# Patient Record
Sex: MALE | Race: WHITE | NOT HISPANIC OR LATINO | Employment: OTHER | URBAN - NONMETROPOLITAN AREA
[De-identification: names, ages, dates, MRNs, and addresses within clinical notes are randomized per-mention and may not be internally consistent; named-entity substitution may affect disease eponyms.]

---

## 2018-08-13 ENCOUNTER — RESULTS ENCOUNTER (OUTPATIENT)
Dept: FAMILY MEDICINE CLINIC | Facility: CLINIC | Age: 48
End: 2018-08-13

## 2018-08-13 ENCOUNTER — OFFICE VISIT (OUTPATIENT)
Dept: FAMILY MEDICINE CLINIC | Facility: CLINIC | Age: 48
End: 2018-08-13

## 2018-08-13 VITALS
TEMPERATURE: 98.2 F | SYSTOLIC BLOOD PRESSURE: 120 MMHG | DIASTOLIC BLOOD PRESSURE: 76 MMHG | WEIGHT: 236.2 LBS | BODY MASS INDEX: 34.98 KG/M2 | RESPIRATION RATE: 16 BRPM | OXYGEN SATURATION: 98 % | HEART RATE: 59 BPM | HEIGHT: 69 IN

## 2018-08-13 DIAGNOSIS — R47.01 EXPRESSIVE APHASIA: ICD-10-CM

## 2018-08-13 DIAGNOSIS — I10 ESSENTIAL HYPERTENSION: Primary | ICD-10-CM

## 2018-08-13 DIAGNOSIS — I10 ESSENTIAL HYPERTENSION: ICD-10-CM

## 2018-08-13 DIAGNOSIS — I69.398 ALTERATION OF SENSATIONS, POST-STROKE: ICD-10-CM

## 2018-08-13 DIAGNOSIS — R20.9 ALTERATION OF SENSATIONS, POST-STROKE: ICD-10-CM

## 2018-08-13 PROBLEM — G47.33 OBSTRUCTIVE SLEEP APNEA: Status: ACTIVE | Noted: 2018-08-13

## 2018-08-13 PROCEDURE — 99202 OFFICE O/P NEW SF 15 MIN: CPT | Performed by: NURSE PRACTITIONER

## 2018-08-13 RX ORDER — LABETALOL 100 MG/1
100 TABLET, FILM COATED ORAL 3 TIMES DAILY
COMMUNITY
End: 2018-08-13 | Stop reason: SDUPTHER

## 2018-08-13 RX ORDER — SENNA AND DOCUSATE SODIUM 50; 8.6 MG/1; MG/1
1 TABLET, FILM COATED ORAL DAILY
COMMUNITY
End: 2018-09-13

## 2018-08-13 RX ORDER — LISINOPRIL 20 MG/1
20 TABLET ORAL DAILY
Qty: 90 TABLET | Refills: 3 | Status: SHIPPED | OUTPATIENT
Start: 2018-08-13 | End: 2019-01-07 | Stop reason: SDUPTHER

## 2018-08-13 RX ORDER — SPIRONOLACTONE 25 MG/1
25 TABLET ORAL DAILY
Qty: 90 TABLET | Refills: 3 | Status: SHIPPED | OUTPATIENT
Start: 2018-08-13 | End: 2019-01-08 | Stop reason: SDUPTHER

## 2018-08-13 RX ORDER — AMLODIPINE BESYLATE 10 MG/1
10 TABLET ORAL DAILY
COMMUNITY
End: 2018-08-13 | Stop reason: SDUPTHER

## 2018-08-13 RX ORDER — LISINOPRIL 20 MG/1
20 TABLET ORAL DAILY
COMMUNITY
End: 2018-08-13 | Stop reason: SDUPTHER

## 2018-08-13 RX ORDER — LABETALOL 100 MG/1
100 TABLET, FILM COATED ORAL 3 TIMES DAILY
Qty: 270 TABLET | Refills: 3 | Status: SHIPPED | OUTPATIENT
Start: 2018-08-13 | End: 2019-01-08 | Stop reason: SDUPTHER

## 2018-08-13 RX ORDER — SPIRONOLACTONE 25 MG/1
25 TABLET ORAL DAILY
COMMUNITY
End: 2018-08-13 | Stop reason: SDUPTHER

## 2018-08-13 RX ORDER — ACETAMINOPHEN 325 MG/1
650 TABLET ORAL EVERY 4 HOURS PRN
COMMUNITY

## 2018-08-13 RX ORDER — AMLODIPINE BESYLATE 10 MG/1
10 TABLET ORAL DAILY
Qty: 90 TABLET | Refills: 3 | Status: SHIPPED | OUTPATIENT
Start: 2018-08-13 | End: 2019-01-07 | Stop reason: SDUPTHER

## 2018-08-13 RX ORDER — DOCUSATE SODIUM 100 MG/1
100 CAPSULE, LIQUID FILLED ORAL 2 TIMES DAILY
COMMUNITY
End: 2019-01-07 | Stop reason: SDUPTHER

## 2018-08-13 NOTE — PROGRESS NOTES
Subjective   Gaurav Mora is a 48 y.o. male. Here to establish care    History of Present Illness 48-year-old  male presents to the office today to establish care. Patient is known to have had a stroke. No known allergies. Using Tylenol 325 mg tablet as needed for pain. Norvasc 10 mg tablet per day for blood pressure aspirin 81 mg tablet per day for heart health Colace 100 mg capsule as needed for constipation. Labia tall 100 mg tablet for blood pressure and lisinopril 20 mg tablet per day as needed for blood pressure also uses Senokot-as 8.6/50 mg tablet per day for constipation and Aldactone 25 mg tablet per day for edema. Offers no complaints. From hypertension and stroke patient also has sleep apnea. No surgical history noted family history of atrial fibrillation hypertension and cancer. Patient is never used any form of tobacco alcohol or drugs. Per health maintenance history patient is due for a T dap and an annual physical.    The following portions of the patient's history were reviewed and updated as appropriate: allergies, current medications, past family history, past medical history, past social history, past surgical history and problem list.    Review of Systems   Cardiovascular:        History of stroke hypertension   All other systems reviewed and are negative.      Objective   Physical Exam   Constitutional: He is oriented to person, place, and time. He appears well-developed and well-nourished.   HENT:   Head: Normocephalic and atraumatic.   Eyes: Pupils are equal, round, and reactive to light. EOM are normal.   No peripheral vision in either eye.   Neck: Normal range of motion. Neck supple.   Cardiovascular: Normal rate and regular rhythm.    Pulmonary/Chest: Effort normal and breath sounds normal.   Abdominal: Soft. Bowel sounds are normal.   Musculoskeletal:   Slightly guarded gait but able to walk without walker or cane.   Neurological: He is alert and oriented to person, place, and  time.   Numbness tingling loss of feeling on right side of body. Sometimes needs instructions repeated slowly and clearly for second time.   Skin: Skin is warm and dry. Capillary refill takes less than 2 seconds.   Psychiatric: He has a normal mood and affect. His behavior is normal. Judgment and thought content normal.   Nursing note and vitals reviewed.      Assessment/Plan   Gaurav was seen today for establish care and history of stroke.    Diagnoses and all orders for this visit:    Essential hypertension  -     CBC & Differential; Future  -     Comprehensive Metabolic Panel; Future  -     Lipid Panel; Future  -     TSH; Future  -     Ambulatory Referral to Cardiology    Alteration of sensations, post-stroke  -     Ambulatory Referral to Neurology    Expressive aphasia    Other orders  -     spironolactone (ALDACTONE) 25 MG tablet; Take 1 tablet by mouth Daily.  -     lisinopril (PRINIVIL,ZESTRIL) 20 MG tablet; Take 1 tablet by mouth Daily.  -     labetalol (NORMODYNE) 100 MG tablet; Take 1 tablet by mouth 3 (Three) Times a Day.  -     amLODIPine (NORVASC) 10 MG tablet; Take 1 tablet by mouth Daily.     Will need to request records from previous provider. Made patient aware of availability for annual physical. Patient aware of need for T dap. She will return for fasting labs. Labs will be called once resulted. Medications renewed as needed. Patient to continue eating a heart healthy diet low-fat high-fiber full of fruits and vegetables drinking six-day glasses water a day and ambulating every day for 30 minutes to raise heart rate this will help maintain good bone density muscle mass heart health and has been known to elevate the psyche. To notify this office immediately for any decrease in health status. Patient current on all health care maintenance issues at this time the exception of annual physical and tetanus vaccine.

## 2018-08-13 NOTE — PROGRESS NOTES
Prosper Mora is a 48 y.o. male.   History of Present Illness     {Common H&P Review Areas:26492}    Review of Systems   All other systems reviewed and are negative.      Objective   Physical Exam      Assessment/Plan   {Assess/PlanSmartLinks:03170}

## 2018-08-14 LAB
ALBUMIN SERPL-MCNC: 4.8 G/DL (ref 3.5–5.2)
ALBUMIN/GLOB SERPL: 2.1 G/DL
ALP SERPL-CCNC: 65 U/L (ref 39–117)
ALT SERPL-CCNC: 23 U/L (ref 1–41)
AST SERPL-CCNC: 16 U/L (ref 1–40)
BASOPHILS # BLD AUTO: 0.03 10*3/MM3 (ref 0–0.2)
BASOPHILS NFR BLD AUTO: 0.5 % (ref 0–1.5)
BILIRUB SERPL-MCNC: 0.6 MG/DL (ref 0.1–1.2)
BUN SERPL-MCNC: 15 MG/DL (ref 6–20)
BUN/CREAT SERPL: 12 (ref 7–25)
CALCIUM SERPL-MCNC: 9.2 MG/DL (ref 8.6–10.5)
CHLORIDE SERPL-SCNC: 97 MMOL/L (ref 98–107)
CHOLEST SERPL-MCNC: 194 MG/DL (ref 0–200)
CO2 SERPL-SCNC: 29.5 MMOL/L (ref 22–29)
CREAT SERPL-MCNC: 1.25 MG/DL (ref 0.76–1.27)
EOSINOPHIL # BLD AUTO: 0.14 10*3/MM3 (ref 0–0.7)
EOSINOPHIL NFR BLD AUTO: 2.2 % (ref 0.3–6.2)
ERYTHROCYTE [DISTWIDTH] IN BLOOD BY AUTOMATED COUNT: 12.6 % (ref 11.5–14.5)
GLOBULIN SER CALC-MCNC: 2.3 GM/DL
GLUCOSE SERPL-MCNC: 89 MG/DL (ref 65–99)
HCT VFR BLD AUTO: 44.8 % (ref 40.4–52.2)
HDLC SERPL-MCNC: 39 MG/DL (ref 40–60)
HGB BLD-MCNC: 14.5 G/DL (ref 13.7–17.6)
IMM GRANULOCYTES # BLD: 0.01 10*3/MM3 (ref 0–0.03)
IMM GRANULOCYTES NFR BLD: 0.2 % (ref 0–0.5)
LDLC SERPL CALC-MCNC: 142 MG/DL (ref 0–100)
LYMPHOCYTES # BLD AUTO: 2.01 10*3/MM3 (ref 0.9–4.8)
LYMPHOCYTES NFR BLD AUTO: 32.3 % (ref 19.6–45.3)
MCH RBC QN AUTO: 27.7 PG (ref 27–32.7)
MCHC RBC AUTO-ENTMCNC: 32.4 G/DL (ref 32.6–36.4)
MCV RBC AUTO: 85.7 FL (ref 79.8–96.2)
MONOCYTES # BLD AUTO: 0.51 10*3/MM3 (ref 0.2–1.2)
MONOCYTES NFR BLD AUTO: 8.2 % (ref 5–12)
NEUTROPHILS # BLD AUTO: 3.54 10*3/MM3 (ref 1.9–8.1)
NEUTROPHILS NFR BLD AUTO: 56.8 % (ref 42.7–76)
PLATELET # BLD AUTO: 216 10*3/MM3 (ref 140–500)
POTASSIUM SERPL-SCNC: 4.4 MMOL/L (ref 3.5–5.2)
PROT SERPL-MCNC: 7.1 G/DL (ref 6–8.5)
RBC # BLD AUTO: 5.23 10*6/MM3 (ref 4.6–6)
SODIUM SERPL-SCNC: 138 MMOL/L (ref 136–145)
TRIGL SERPL-MCNC: 66 MG/DL (ref 0–150)
TSH SERPL DL<=0.005 MIU/L-ACNC: 1.86 MIU/ML (ref 0.27–4.2)
VLDLC SERPL CALC-MCNC: 13.2 MG/DL (ref 5–40)
WBC # BLD AUTO: 6.23 10*3/MM3 (ref 4.5–10.7)

## 2018-08-14 NOTE — PROGRESS NOTES
I have reviewed the notes, assessments, and/or procedures performed by Tressa Hwang, I concur with her/his documentation of Gaurav Mora.

## 2018-09-13 ENCOUNTER — OFFICE VISIT (OUTPATIENT)
Dept: CARDIOLOGY | Facility: CLINIC | Age: 48
End: 2018-09-13

## 2018-09-13 VITALS
BODY MASS INDEX: 34.39 KG/M2 | DIASTOLIC BLOOD PRESSURE: 70 MMHG | HEIGHT: 69 IN | HEART RATE: 63 BPM | SYSTOLIC BLOOD PRESSURE: 128 MMHG | WEIGHT: 232.2 LBS

## 2018-09-13 DIAGNOSIS — I61.9 HEMORRHAGIC CEREBROVASCULAR ACCIDENT (CVA) (HCC): ICD-10-CM

## 2018-09-13 DIAGNOSIS — I10 ESSENTIAL HYPERTENSION: Primary | ICD-10-CM

## 2018-09-13 PROCEDURE — 93000 ELECTROCARDIOGRAM COMPLETE: CPT | Performed by: INTERNAL MEDICINE

## 2018-09-13 PROCEDURE — 99204 OFFICE O/P NEW MOD 45 MIN: CPT | Performed by: INTERNAL MEDICINE

## 2018-09-27 ENCOUNTER — CLINICAL SUPPORT (OUTPATIENT)
Dept: FAMILY MEDICINE CLINIC | Facility: CLINIC | Age: 48
End: 2018-09-27

## 2018-09-27 DIAGNOSIS — Z23 ENCOUNTER FOR IMMUNIZATION: Primary | ICD-10-CM

## 2018-09-27 PROCEDURE — 90471 IMMUNIZATION ADMIN: CPT | Performed by: FAMILY MEDICINE

## 2018-09-27 PROCEDURE — 90674 CCIIV4 VAC NO PRSV 0.5 ML IM: CPT | Performed by: FAMILY MEDICINE

## 2018-09-30 NOTE — PROGRESS NOTES
Date of Office Visit: 2018  Encounter Provider: Jeremiah Bradford MD  Place of Service: [facility] CARDIOLOGY  Patient Name: Gaurav Mora  :1970    Chief complaint: Hypertension, prior hemorrhagic CVA.    History of Present Illness:    I had the pleasure of seeing the patient in cardiology office on 2018.  He is a   very pleasant 48 year-old white male with a history of hypertension, obstructive   sleep apnea, and hemorrhagic CVA who presents to establish care. The patient's   father is with him today, and recently moved from [place] to assist in his care.    The [facility] [place] [facility] [place] [place]. He experienced   right sided neurological symptoms and visual disturbances on 2017. He was   brought to the hospital at that time where he was found to have an extensive   hemorrhagic CVA.  His blood pressure upon presentation was 242/130.  He was   unaware that he had hypertension prior to this, and it was felt that the hypertension   caused the brain hemorrhage.  Since that time, he has been placed on amlodipine,   labetalol, lisinopril, and spironolactone with good results.  He still does have   complete loss of sensation on the right side (although he still has good strength in   the right side), as well as loss of peripheral vision.  He did have an echocardiogram   in [place] the time of his stroke on 2017 which showed an ejection fraction of   60%, and a very mildly dilated proximal aorta at 3.6 cm.    The patient presents today to establish care.  He is mainly here for hypertension   management.  He is very faithful about taking all of his blood pressure medications,   and frequently checks his blood pressure.  His blood pressure has been doing well   recently.  He has not had any shortness of breath or chest pain.  He does have   obstructive sleep apnea, but is faithful about wearing his CPAP mask.    Past Medical  History:   Diagnosis Date   • Hemorrhagic stroke (CMS/HCC) 11/24/2017    Residual peripheral vision loss, right-sided sensation loss   • Hypertension    • SHANE on CPAP        Past Surgical History:   Procedure Laterality Date   • APPENDECTOMY         Current Outpatient Prescriptions on File Prior to Visit   Medication Sig Dispense Refill   • acetaminophen (TYLENOL) 325 MG tablet Take 650 mg by mouth Every 4 (Four) Hours As Needed for Mild Pain .     • amLODIPine (NORVASC) 10 MG tablet Take 1 tablet by mouth Daily. 90 tablet 3   • aspirin 81 MG tablet Take 81 mg by mouth Daily.     • docusate sodium (COLACE) 100 MG capsule Take 100 mg by mouth 2 (Two) Times a Day.     • labetalol (NORMODYNE) 100 MG tablet Take 1 tablet by mouth 3 (Three) Times a Day. 270 tablet 3   • lisinopril (PRINIVIL,ZESTRIL) 20 MG tablet Take 1 tablet by mouth Daily. 90 tablet 3   • spironolactone (ALDACTONE) 25 MG tablet Take 1 tablet by mouth Daily. 90 tablet 3     No current facility-administered medications on file prior to visit.      Allergies as of 09/13/2018   • (No Known Allergies)     Social History     Social History   • Marital status: Single     Spouse name: N/A   • Number of children: 0   • Years of education: N/A     Occupational History   • disability      Social History Main Topics   • Smoking status: Never Smoker   • Smokeless tobacco: Never Used      Comment: minimal caffeine   • Alcohol use No   • Drug use: No   • Sexual activity: Not on file     Other Topics Concern   • Not on file     Social History Narrative   • No narrative on file     Family History   Problem Relation Age of Onset   • Breast cancer Mother    • Atrial fibrillation Father    • Hypertension Father    • Colon cancer Paternal Grandmother    • Heart disease Maternal Grandfather    • Heart disease Maternal Aunt    • Heart disease Maternal Uncle        Review of Systems   Eyes: Positive for visual disturbance.   Neurological: Positive for numbness.   All other  "systems reviewed and are negative.     Objective:     Vitals:    09/13/18 1133   BP: 128/70   Pulse: 63   Weight: 105 kg (232 lb 3.2 oz)   Height: 175.3 cm (69.02\")     Body mass index is 34.27 kg/m².    Physical Exam   Constitutional: He is oriented to person, place, and time. He appears well-developed and well-nourished.   HENT:   Head: Normocephalic and atraumatic.   Eyes: Conjunctivae are normal.   Neck: Neck supple.   Cardiovascular: Normal rate and regular rhythm.  Exam reveals no gallop and no friction rub.    No murmur heard.  Pulmonary/Chest: Effort normal and breath sounds normal.   Abdominal: Soft. There is no tenderness.   Musculoskeletal: He exhibits no edema.   Neurological: He is alert and oriented to person, place, and time.   Skin: Skin is warm.   Psychiatric: He has a normal mood and affect. His behavior is normal.     Lab Review:     ECG 12 Lead  Date/Time: 9/13/2018 11:38 AM  Performed by: KALI MCDONALD  Authorized by: KALI MCDONALD   Comparison: not compared with previous ECG   Previous ECG: no previous ECG available  Rhythm: sinus rhythm  Rate: normal  BPM: 63  Clinical impression: abnormal ECG  Comments: Possible old septal infarct           Cardiac Procedures:  1.  Echocardiogram on 11/27/2017 in [place], [place]: The ejection fraction was 60%. The   aortic root measured 3.8 cm.  The proximal aorta was 3.6 cm.    Assessment:       Diagnosis Plan   1. Essential hypertension     2. Hemorrhagic cerebrovascular accident (CVA) (CMS/[facility]ly [facility])       Plan:       The patient seems to be doing well.  Again, he had severe undiagnosed hypertension at the   time of his hemorrhagic stroke in November 2017.  Since that time, he has been very faithful   about taking his medications, and his blood pressure has responded to his current regimen.    He currently is taking amlodipine 10 mg per day, labetalol 100 mg 3 times a day, lisinopril 20   mg per day, and spironolactone 25 mg per day.  His blood " pressure today is 128/70.  He is   also faithful about wearing his CPAP mask.  His echocardiogram in [place] he did show a   very mildly dilated ascending aorta at 3.6 cm.  I will likely check a CT angiogram of his chest   at some point to ensure that he does not have an aortic aneurysm distal to this site.  I will   discuss this with him at his next visit.  The patient is currently on disability, and still has   residual neurological sequelae from the hemorrhagic stroke. However, his father has moved   here, and helps with his care.  For now, I will plan on seeing him back in the office in 6   months.

## 2018-11-05 ENCOUNTER — OFFICE VISIT (OUTPATIENT)
Dept: NEUROLOGY | Facility: CLINIC | Age: 48
End: 2018-11-05

## 2018-11-05 VITALS
DIASTOLIC BLOOD PRESSURE: 76 MMHG | WEIGHT: 232 LBS | HEART RATE: 73 BPM | OXYGEN SATURATION: 96 % | HEIGHT: 69 IN | BODY MASS INDEX: 34.36 KG/M2 | SYSTOLIC BLOOD PRESSURE: 124 MMHG

## 2018-11-05 DIAGNOSIS — E78.5 HYPERLIPIDEMIA LDL GOAL <70: Primary | ICD-10-CM

## 2018-11-05 DIAGNOSIS — I69.30 SEQUELAE, POST-STROKE: ICD-10-CM

## 2018-11-05 PROCEDURE — 99244 OFF/OP CNSLTJ NEW/EST MOD 40: CPT | Performed by: PSYCHIATRY & NEUROLOGY

## 2018-11-05 RX ORDER — ROSUVASTATIN CALCIUM 10 MG/1
10 TABLET, COATED ORAL NIGHTLY
Qty: 30 TABLET | Refills: 11 | Status: SHIPPED | OUTPATIENT
Start: 2018-11-05 | End: 2018-11-05 | Stop reason: SDUPTHER

## 2018-11-05 RX ORDER — ROSUVASTATIN CALCIUM 10 MG/1
10 TABLET, COATED ORAL NIGHTLY
Qty: 90 TABLET | Refills: 3 | Status: SHIPPED | OUTPATIENT
Start: 2018-11-05 | End: 2019-01-07 | Stop reason: SDUPTHER

## 2018-11-05 RX ORDER — ROSUVASTATIN CALCIUM 10 MG/1
10 TABLET, COATED ORAL NIGHTLY
Qty: 30 TABLET | Refills: 0 | Status: SHIPPED | OUTPATIENT
Start: 2018-11-05 | End: 2019-01-07 | Stop reason: SDUPTHER

## 2018-11-05 NOTE — PROGRESS NOTES
CC: Stroke/parenchymal hemorrhage    HPI:  Gaurav Mora is a  48 y.o.  right-handed white male who was sent for a neurologic consultation by Brandie Hwang NP due to his history of stroke associated with extremely high blood pressure on 11/23/17.  He was in [place] when this occurred.  He was taken from the [facility] to a stroke center [facility] and saw Dr. Orona and subsequently saw Dr. Pan last in June of this year.  The patient had a left basal ganglia/temporal lobe hemorrhage with some intraventricular hemorrhage present.  He had an angiogram which did not show evidence of an AVM.  The anterior communicating artery was small and no posterior communicating arteries were identified.  The stroke caused a right hemiparesis.  He states that his strength has come back to near normal still has numbness on the right side and a visual field cut on the right side.  He has some minor speech changes which have persisted.  His blood pressure has been under excellent control.  He now lives locally with his parents.  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  He continues on disability.  He is service-connected in the  for his hypertension and so his brain hemorrhage would be also service-connected.  He also has obstructive sleep apnea and uses a CPAP machine.  After moving back here with his parents he has lost about 60-70 pounds.  His blood pressure is checked on a frequent basis.  I reviewed the blood pressure chart showing a lot of blood pressures in the 110-120 range systolic.  Occasionally the systolic dips below 100 but is not symptomatic according to his father who was with him to help with the history.    He was recently seen by Dr. Bradford of cardiology.  His EKG appears to be consistent with an old septal MI.        Past Medical History:   Diagnosis Date   • Headache, tension-type    • Hemorrhagic stroke (CMS/[facility]) 11/24/2017    Residual peripheral vision loss,  right-sided sensation loss   • Hypertension    • SHANE on CPAP    • Stroke (CMS/HCC)          Past Surgical History:   Procedure Laterality Date   • APPENDECTOMY             Current Outpatient Prescriptions:   •  acetaminophen (TYLENOL) 325 MG tablet, Take 650 mg by mouth Every 4 (Four) Hours As Needed for Mild Pain ., Disp: , Rfl:   •  amLODIPine (NORVASC) 10 MG tablet, Take 1 tablet by mouth Daily., Disp: 90 tablet, Rfl: 3  •  aspirin 81 MG tablet, Take 81 mg by mouth Daily., Disp: , Rfl:   •  labetalol (NORMODYNE) 100 MG tablet, Take 1 tablet by mouth 3 (Three) Times a Day., Disp: 270 tablet, Rfl: 3  •  lisinopril (PRINIVIL,ZESTRIL) 20 MG tablet, Take 1 tablet by mouth Daily., Disp: 90 tablet, Rfl: 3  •  spironolactone (ALDACTONE) 25 MG tablet, Take 1 tablet by mouth Daily., Disp: 90 tablet, Rfl: 3  •  docusate sodium (COLACE) 100 MG capsule, Take 100 mg by mouth 2 (Two) Times a Day., Disp: , Rfl:   •  rosuvastatin (CRESTOR) 10 MG tablet, Take 1 tablet by mouth Every Night., Disp: 30 tablet, Rfl: 0  •  rosuvastatin (CRESTOR) 10 MG tablet, Take 1 tablet by mouth Every Night., Disp: 90 tablet, Rfl: 3      Family History   Problem Relation Age of Onset   • Breast cancer Mother    • Atrial fibrillation Father    • Hypertension Father    • Colon cancer Paternal Grandmother    • Heart disease Maternal Grandfather    • Heart disease Maternal Aunt    • Heart disease Maternal Uncle          Social History     Social History   • Marital status: Single     Spouse name: N/A   • Number of children: 0   • Years of education: N/A     Occupational History   • disability      Social History Main Topics   • Smoking status: Never Smoker   • Smokeless tobacco: Never Used      Comment: minimal caffeine   • Alcohol use No   • Drug use: No   • Sexual activity: Defer     Other Topics Concern   • Not on file     Social History Narrative   • No narrative on file         No Known Allergies      Pain Scale: 0/10        ROS:  Review of Systems  "  Constitutional: Negative for chills, fatigue and fever.   HENT: Positive for congestion. Negative for tinnitus and trouble swallowing.    Eyes: Negative for pain, redness and itching.   Respiratory: Positive for apnea. Negative for shortness of breath and wheezing.    Cardiovascular: Negative for chest pain, palpitations and leg swelling.   Gastrointestinal: Negative for constipation, diarrhea, nausea and vomiting.   Endocrine: Negative for cold intolerance, heat intolerance and polydipsia.   Genitourinary: Negative for decreased urine volume, difficulty urinating, frequency and urgency.   Musculoskeletal: Negative for back pain, gait problem, neck pain and neck stiffness.   Skin: Negative for color change, rash and wound.   Allergic/Immunologic: Negative for environmental allergies, food allergies and immunocompromised state.   Neurological: Negative for dizziness, tremors, seizures, syncope, facial asymmetry, speech difficulty, weakness, light-headedness, numbness and headaches.   Hematological: Negative for adenopathy. Does not bruise/bleed easily.   Psychiatric/Behavioral: Negative for agitation, behavioral problems, confusion, decreased concentration, dysphoric mood, hallucinations, self-injury, sleep disturbance and suicidal ideas. The patient is not nervous/anxious and is not hyperactive.            Physical Exam:  Vitals:    11/05/18 1359   BP: 124/76   Pulse: 73   SpO2: 96%   Weight: 105 kg (232 lb)   Height: 175.3 cm (69\")     Body mass index is 34.26 kg/m².    Physical Exam  Gen.: Obese white male no acute distress  HEENT: Normocephalic no evidence of trauma.  Discs flat.  No A-V nicking.  Throat negative.  Neck: Supple.  No thyromegaly.  No cervical bruits.  Radial pulses were strong and simultaneous.  Heart: Regular rate and rhythm no murmurs.  No pedal edema.      Neurological Exam:   Mental status: Awake alert oriented and conversant without evidence of an affective disorder thought disorder " delusions or hallucinations.  HCF: There is mild dysphasia noted by word substitution and some halting speech.  There was also a small degree of dyspraxia.  Minimal dysarthria.  Cranial nerves: Right homonymous hemianopia.  Eye movements are full no nystagmus.  Pupils equal and reactive.  Light touch and pinprick are reduced on the right.  Face appears symmetric.  Hearing was intact bilaterally to finger rub.  Soft palate elevates equally.  Sternomastoid and trapezius are strong.  Tongue midline.  Motor: Minimal increased tone on the right.  There is a mild right hemiparesis.  Muscle stretch reflexes: There is minimal reflex asymmetry on the right.  Toe sign is upgoing on the right and downgoing on the left.  Sensory: Reduced light touch and pinprick right arm and right leg.  Position sense is poor in the right great toe.  Vibration sense appeared intact bilaterally.  Cerebellar: Finger to nose is somewhat imprecise with the right hand.  Rapid movements were pretty good bilaterally.  Heel to shin was imprecise and the patient demonstrated dyspraxia upon attempting this maneuver.  Gait and Station: Casual walk shows some mild broad-based gait and some circumduction intermittently with the right leg.  He is able to walk on toes and heels but he is a little clumsy.  No Romberg and no drift        Results:      Lab Results   Component Value Date    BUN 15 08/14/2018    CREATININE 1.25 08/14/2018    EGFRIFNONA 62 08/14/2018    EGFRIFAFRI 75 08/14/2018    BCR 12.0 08/14/2018    CO2 29.5 (H) 08/14/2018    CALCIUM 9.2 08/14/2018    PROTENTOTREF 7.1 08/14/2018    ALBUMIN 4.80 08/14/2018    LABIL2 2.1 08/14/2018    AST 16 08/14/2018    ALT 23 08/14/2018       Lab Results   Component Value Date    HGB 14.5 08/14/2018    HCT 44.8 08/14/2018    MCV 85.7 08/14/2018     08/14/2018         .No results found for: RPR      Lab Results   Component Value Date    TSH 1.860 08/14/2018         No results found for:  [id number]      No results found for: FOLATE      No results found for: HGBA1C    Review of previous hospitalization and rehabilitation from [place] summarized as above        Assessment:   1.  History of left hemisphere hypertensive intracerebral hemorrhage with intraventricular extension due to dramatically elevated blood pressure-the patient has gone to rehabilitation and is now post rehabilitation and unlikely to improve further.  No recurrent TIA or stroke symptoms.  2.  Risk factors of hypertension and hyperlipidemia        Plan:  1.  Continue control of risk factors.  His blood pressure is under good control but he is on 4 drugs for this.  In addition he has hyperlipidemia with  and his HDL is a little low at 34.  I discussed this with the patient and his father.  His diet has improved since moving to [place] and his blood pressure has been under control and he has lost weight.  I doubt further dietary/exercise maneuvers will help and it's time for him to consider a statin drug.  Will start him on a rosuvastatin 10 mg.  Side effects reviewed particularly possible liver effects and muscular effects.  2.  Continue aspirin 81 mg daily.  3.  The patient's father indicates that some disability papers will be forthcoming.  4.  Follow-up in one year with a nurse practitioner            At least 50% of this 60 minute consult was spent counseling the patient on risks factor control, treatment with a statin and side effects reported            Dictated utilizing Dragon dictation.

## 2019-01-07 ENCOUNTER — OFFICE VISIT (OUTPATIENT)
Dept: FAMILY MEDICINE CLINIC | Facility: CLINIC | Age: 49
End: 2019-01-07

## 2019-01-07 ENCOUNTER — TELEPHONE (OUTPATIENT)
Dept: FAMILY MEDICINE CLINIC | Facility: CLINIC | Age: 49
End: 2019-01-07

## 2019-01-07 VITALS
WEIGHT: 238.4 LBS | HEIGHT: 69 IN | TEMPERATURE: 98 F | RESPIRATION RATE: 16 BRPM | DIASTOLIC BLOOD PRESSURE: 80 MMHG | SYSTOLIC BLOOD PRESSURE: 121 MMHG | BODY MASS INDEX: 35.31 KG/M2 | HEART RATE: 58 BPM | OXYGEN SATURATION: 99 %

## 2019-01-07 DIAGNOSIS — E78.5 HYPERLIPIDEMIA LDL GOAL <70: ICD-10-CM

## 2019-01-07 DIAGNOSIS — K59.01 SLOW TRANSIT CONSTIPATION: ICD-10-CM

## 2019-01-07 DIAGNOSIS — I10 ESSENTIAL HYPERTENSION: Primary | ICD-10-CM

## 2019-01-07 DIAGNOSIS — T78.40XA ALLERGIC STATE, INITIAL ENCOUNTER: ICD-10-CM

## 2019-01-07 PROCEDURE — 99214 OFFICE O/P EST MOD 30 MIN: CPT | Performed by: NURSE PRACTITIONER

## 2019-01-07 RX ORDER — LISINOPRIL 20 MG/1
20 TABLET ORAL DAILY
Qty: 90 TABLET | Refills: 3 | Status: SHIPPED | OUTPATIENT
Start: 2019-01-07 | End: 2019-01-08 | Stop reason: SDUPTHER

## 2019-01-07 RX ORDER — ROSUVASTATIN CALCIUM 10 MG/1
10 TABLET, COATED ORAL NIGHTLY
Qty: 90 TABLET | Refills: 3 | Status: SHIPPED | OUTPATIENT
Start: 2019-01-07 | End: 2019-01-08 | Stop reason: SDUPTHER

## 2019-01-07 RX ORDER — MONTELUKAST SODIUM 10 MG/1
10 TABLET ORAL NIGHTLY
Qty: 30 TABLET | Refills: 6 | Status: SHIPPED | OUTPATIENT
Start: 2019-01-07 | End: 2019-05-17

## 2019-01-07 RX ORDER — AMLODIPINE BESYLATE 10 MG/1
10 TABLET ORAL DAILY
Qty: 90 TABLET | Refills: 3 | Status: SHIPPED | OUTPATIENT
Start: 2019-01-07 | End: 2019-01-08 | Stop reason: SDUPTHER

## 2019-01-07 RX ORDER — DOCUSATE SODIUM 100 MG/1
100 CAPSULE, LIQUID FILLED ORAL DAILY
Qty: 90 CAPSULE | Refills: 3 | Status: SHIPPED | OUTPATIENT
Start: 2019-01-07 | End: 2019-01-08 | Stop reason: SDUPTHER

## 2019-01-07 NOTE — TELEPHONE ENCOUNTER
Please send all long term medicine to [facility], only the Singulair needs to be sent to [facility]

## 2019-01-07 NOTE — PROGRESS NOTES
Subjective   Gaurav Mora is a 48 y.o. male. Patient is being evaluated today for medication management for hypertension and dyslipidemia.     History of Present Illness 48-year-old  male presents to the office today for medication management in relation to long-term treatment of hypertension amlodipine 10 mg per day and lisinopril 20 mg per day and dyslipidemia, Crestor 10 mg per day with a low-fat high-fiber diet 1 gallon of water a day and daily exercise. Offers no complaints with medication.  Pt gets stuffy at nigh time  And coughing. Uses CPAP.New onset symptoms within last month. TX with nothing. Nothing makes better or worse.    The following portions of the patient's history were reviewed and updated as appropriate: allergies, current medications, past family history, past medical history, past social history, past surgical history and problem list.    Review of Systems   Cardiovascular:        Hypertension.  Dyslipidemia.    All other systems reviewed and are negative.      Objective   Physical Exam   Constitutional: He is oriented to person, place, and time. He appears well-developed and well-nourished.   HENT:   Head: Normocephalic and atraumatic.   New onset coughing and congestion at bedtime.   Eyes: EOM are normal. Pupils are equal, round, and reactive to light.   Glasses.Loss of peripheral vision right side of both eyes.   Neck: Normal range of motion. Neck supple.   Cardiovascular: Normal rate and regular rhythm.   Pulmonary/Chest: Effort normal and breath sounds normal.   Abdominal: Soft. Bowel sounds are normal.   Musculoskeletal: Normal range of motion.   Neurological: He is alert and oriented to person, place, and time.   Skin: Skin is warm and dry. Capillary refill takes less than 2 seconds.   Psychiatric: He has a normal mood and affect. His behavior is normal. Judgment and thought content normal.   Nursing note and vitals reviewed.        Assessment/Plan   Gaurav was seen today for med  management and allergies.    Diagnoses and all orders for this visit:    Essential hypertension  -     CBC & Differential  -     Comprehensive Metabolic Panel  -     Lipid Panel  -     TSH  -     amLODIPine (NORVASC) 10 MG tablet; Take 1 tablet by mouth Daily.  -     lisinopril (PRINIVIL,ZESTRIL) 20 MG tablet; Take 1 tablet by mouth Daily.    Hyperlipidemia LDL goal <70  -     rosuvastatin (CRESTOR) 10 MG tablet; Take 1 tablet by mouth Every Night.    Slow transit constipation  -     docusate sodium (COLACE) 100 MG capsule; Take 1 capsule by mouth Daily.    Allergic state, initial encounter  -     montelukast (SINGULAIR) 10 MG tablet; Take 1 tablet by mouth Every Night.      Labs drawn today to be called once results. Meds renewed as needed. Will begin treatment with Singulair 10 mg tablet at night to see if this helps with his congestion and coughing. If not we will discuss over-the-counter allergy medications versus going to allergy and asthma for evaluation. To remain well hydrated allowing himself 8-10 hours a night sleep. To eat a heart healthy diet drink six-day glasses of water a day and to exercise by walking 30 minutes every day this will help maintain good bone density muscle mass heart health and has been known to elevate the psyche. To follow-up with this office every 6 months and as needed notifying us immediately of any decline in health status.

## 2019-01-08 DIAGNOSIS — E78.5 HYPERLIPIDEMIA LDL GOAL <70: ICD-10-CM

## 2019-01-08 DIAGNOSIS — I10 ESSENTIAL HYPERTENSION: ICD-10-CM

## 2019-01-08 DIAGNOSIS — K59.01 SLOW TRANSIT CONSTIPATION: ICD-10-CM

## 2019-01-08 LAB
ALBUMIN SERPL-MCNC: 4.8 G/DL (ref 3.5–5.2)
ALBUMIN/GLOB SERPL: 2 G/DL
ALP SERPL-CCNC: 63 U/L (ref 39–117)
ALT SERPL-CCNC: 33 U/L (ref 1–41)
AST SERPL-CCNC: 28 U/L (ref 1–40)
BASOPHILS # BLD AUTO: 0.03 10*3/MM3 (ref 0–0.2)
BASOPHILS NFR BLD AUTO: 0.4 % (ref 0–1.5)
BILIRUB SERPL-MCNC: 0.6 MG/DL (ref 0.1–1.2)
BUN SERPL-MCNC: 13 MG/DL (ref 6–20)
BUN/CREAT SERPL: 14 (ref 7–25)
CALCIUM SERPL-MCNC: 9.4 MG/DL (ref 8.6–10.5)
CHLORIDE SERPL-SCNC: 98 MMOL/L (ref 98–107)
CHOLEST SERPL-MCNC: 150 MG/DL (ref 0–200)
CO2 SERPL-SCNC: 26.5 MMOL/L (ref 22–29)
CREAT SERPL-MCNC: 0.93 MG/DL (ref 0.76–1.27)
EOSINOPHIL # BLD AUTO: 0.15 10*3/MM3 (ref 0–0.7)
EOSINOPHIL NFR BLD AUTO: 1.8 % (ref 0.3–6.2)
ERYTHROCYTE [DISTWIDTH] IN BLOOD BY AUTOMATED COUNT: 12.8 % (ref 11.5–14.5)
GLOBULIN SER CALC-MCNC: 2.4 GM/DL
GLUCOSE SERPL-MCNC: 93 MG/DL (ref 65–99)
HCT VFR BLD AUTO: 46.7 % (ref 40.4–52.2)
HDLC SERPL-MCNC: 45 MG/DL (ref 40–60)
HGB BLD-MCNC: 15.1 G/DL (ref 13.7–17.6)
IMM GRANULOCYTES # BLD AUTO: 0.02 10*3/MM3 (ref 0–0.03)
IMM GRANULOCYTES NFR BLD AUTO: 0.2 % (ref 0–0.5)
LDLC SERPL CALC-MCNC: 93 MG/DL (ref 0–100)
LYMPHOCYTES # BLD AUTO: 1.78 10*3/MM3 (ref 0.9–4.8)
LYMPHOCYTES NFR BLD AUTO: 21.6 % (ref 19.6–45.3)
MCH RBC QN AUTO: 27.7 PG (ref 27–32.7)
MCHC RBC AUTO-ENTMCNC: 32.3 G/DL (ref 32.6–36.4)
MCV RBC AUTO: 85.7 FL (ref 79.8–96.2)
MONOCYTES # BLD AUTO: 0.67 10*3/MM3 (ref 0.2–1.2)
MONOCYTES NFR BLD AUTO: 8.1 % (ref 5–12)
NEUTROPHILS # BLD AUTO: 5.62 10*3/MM3 (ref 1.9–8.1)
NEUTROPHILS NFR BLD AUTO: 68.1 % (ref 42.7–76)
PLATELET # BLD AUTO: 246 10*3/MM3 (ref 140–500)
POTASSIUM SERPL-SCNC: 4.5 MMOL/L (ref 3.5–5.2)
PROT SERPL-MCNC: 7.2 G/DL (ref 6–8.5)
RBC # BLD AUTO: 5.45 10*6/MM3 (ref 4.6–6)
SODIUM SERPL-SCNC: 137 MMOL/L (ref 136–145)
TRIGL SERPL-MCNC: 59 MG/DL (ref 0–150)
TSH SERPL DL<=0.005 MIU/L-ACNC: 1.72 MIU/ML (ref 0.27–4.2)
VLDLC SERPL CALC-MCNC: 11.8 MG/DL (ref 5–40)
WBC # BLD AUTO: 8.25 10*3/MM3 (ref 4.5–10.7)

## 2019-01-08 RX ORDER — AMLODIPINE BESYLATE 10 MG/1
10 TABLET ORAL DAILY
Qty: 90 TABLET | Refills: 3 | Status: SHIPPED | OUTPATIENT
Start: 2019-01-08 | End: 2020-01-31 | Stop reason: SDUPTHER

## 2019-01-08 RX ORDER — LABETALOL 100 MG/1
100 TABLET, FILM COATED ORAL 3 TIMES DAILY
Qty: 270 TABLET | Refills: 3 | Status: SHIPPED | OUTPATIENT
Start: 2019-01-08 | End: 2020-01-31 | Stop reason: SDUPTHER

## 2019-01-08 RX ORDER — DOCUSATE SODIUM 100 MG/1
100 CAPSULE, LIQUID FILLED ORAL DAILY
Qty: 90 CAPSULE | Refills: 3 | Status: SHIPPED | OUTPATIENT
Start: 2019-01-08 | End: 2019-05-17

## 2019-01-08 RX ORDER — SPIRONOLACTONE 25 MG/1
25 TABLET ORAL DAILY
Qty: 90 TABLET | Refills: 3 | Status: SHIPPED | OUTPATIENT
Start: 2019-01-08 | End: 2020-03-16 | Stop reason: SDUPTHER

## 2019-01-08 RX ORDER — LISINOPRIL 20 MG/1
20 TABLET ORAL DAILY
Qty: 90 TABLET | Refills: 3 | Status: SHIPPED | OUTPATIENT
Start: 2019-01-08 | End: 2020-03-16 | Stop reason: SDUPTHER

## 2019-01-08 RX ORDER — ROSUVASTATIN CALCIUM 10 MG/1
10 TABLET, COATED ORAL NIGHTLY
Qty: 90 TABLET | Refills: 3 | Status: SHIPPED | OUTPATIENT
Start: 2019-01-08 | End: 2020-01-08

## 2019-03-19 ENCOUNTER — OFFICE VISIT (OUTPATIENT)
Dept: CARDIOLOGY | Facility: CLINIC | Age: 49
End: 2019-03-19

## 2019-03-19 VITALS
DIASTOLIC BLOOD PRESSURE: 72 MMHG | OXYGEN SATURATION: 97 % | WEIGHT: 235.2 LBS | BODY MASS INDEX: 34.83 KG/M2 | SYSTOLIC BLOOD PRESSURE: 110 MMHG | HEART RATE: 57 BPM | HEIGHT: 69 IN

## 2019-03-19 DIAGNOSIS — I10 ESSENTIAL HYPERTENSION: ICD-10-CM

## 2019-03-19 DIAGNOSIS — I69.359 HISTORY OF HEMORRHAGIC STROKE WITH RESIDUAL HEMIPARESIS (HCC): ICD-10-CM

## 2019-03-19 DIAGNOSIS — I77.810 DILATED AORTIC ROOT (HCC): Primary | ICD-10-CM

## 2019-03-19 PROCEDURE — 99214 OFFICE O/P EST MOD 30 MIN: CPT | Performed by: NURSE PRACTITIONER

## 2019-03-19 NOTE — PROGRESS NOTES
[facility]      Patient Name: Gaurav Mora  :1970  Age: 48 y.o.  Primary Cardiologist: Keanu Bradford MD  Encounter Provider:  JUAN DANIEL Adams      Chief Complaint:   Chief Complaint   Patient presents with   • Hypertension         HPI  Gaurav Mora is a 48 y.o. male with a history significant for hemorrhagic stroke secondary to hypertension, history of dilated aortic root.  Patient presents today for semiannual follow-up.  Patient states that he has been doing well over the past 6 months.  He states that he has not had any symptoms.  Denies chest pain, shortness of breath, palpitations, lightheadedness, swelling.  He does report occasional episodes of afternoon fatigue.  He brings with him a blood pressure diary which shows that blood pressures have routinely been in the 110s/70s.  Reports compliance with his medications and does not have any problems to discuss.    The following portions of the patient's history were reviewed and updated as appropriate: allergies, current medications, past family history, past medical history, past social history, past surgical history and problem list.    Current Outpatient Medications on File Prior to Visit   Medication Sig   • acetaminophen (TYLENOL) 325 MG tablet Take 650 mg by mouth Every 4 (Four) Hours As Needed for Mild Pain .   • amLODIPine (NORVASC) 10 MG tablet Take 1 tablet by mouth Daily.   • aspirin 81 MG tablet Take 81 mg by mouth Daily.   • docusate sodium (COLACE) 100 MG capsule Take 1 capsule by mouth Daily.   • labetalol (NORMODYNE) 100 MG tablet Take 1 tablet by mouth 3 (Three) Times a Day.   • lisinopril (PRINIVIL,ZESTRIL) 20 MG tablet Take 1 tablet by mouth Daily.   • montelukast (SINGULAIR) 10 MG tablet Take 1 tablet by mouth Every Night.   • rosuvastatin (CRESTOR) 10 MG tablet Take 1 tablet by mouth Every Night.   • spironolactone (ALDACTONE) 25 MG tablet Take 1 tablet by mouth Daily.     No current  "facility-administered medications on file prior to visit.          Review of Systems   Constitution: Negative for malaise/fatigue.   Cardiovascular: Negative for chest pain and leg swelling.   Respiratory: Negative for shortness of breath.    Neurological: Negative for light-headedness.   All other systems reviewed and are negative.      OBJECTIVE:   Vital Signs  Vitals:    03/19/19 1355   BP: 110/72   Pulse: 57   SpO2: 97%     Estimated body mass index is 34.72 kg/m² as calculated from the following:    Height as of this encounter: 175.3 cm (69.02\").    Weight as of this encounter: 107 kg (235 lb 3.2 oz).    Physical Exam   Constitutional: He is oriented to person, place, and time. Vital signs are normal. He appears well-developed and well-nourished.   Eyes: Conjunctivae are normal.   Neck: Carotid bruit is not present.   Cardiovascular: Normal rate, regular rhythm and normal heart sounds.   No murmur heard.  Pulmonary/Chest: Effort normal and breath sounds normal.   Abdominal: Normal appearance.   Musculoskeletal: Normal range of motion.   No pedal edema   Neurological: He is alert and oriented to person, place, and time. GCS eye subscore is 4. GCS verbal subscore is 5. GCS motor subscore is 6.   Skin: Skin is warm and dry.   Psychiatric: He has a normal mood and affect. His speech is normal and behavior is normal. Judgment and thought content normal. Cognition and memory are normal.       Procedures          ASSESSMENT:      Diagnosis Plan   1. Dilated aortic root (CMS/HCC)  CT Angiogram Chest With & Without Contrast    Basic Metabolic Panel   2. Essential hypertension     3. History of hemorrhagic stroke with residual hemiparesis (CMS/[facility])           PLAN OF CARE:     1. History of dilated aortic root.  Patient's aorta has not been evaluated for at least one year.  Will order CTA chest to further evaluate dilated aortic root.  Patient not having any symptoms.  2. Hypertension.  Patient reports that his blood " pressures have been running in the 110s/70s at home.  Blood pressure is very stable in the office today at 110/72.  He will continue with Norvasc 10 mg daily, labetalol 100 mg 3 times daily, lisinopril 20 mg daily, spironolactone 25 mg daily.  Patient states that he is doing well and does not have any episodes of chest pain, shortness of breath, headaches.  3. History of hemorrhagic stroke.  Some minor residual symptoms of the right side but patient is doing very well.  4. Follow-up with Dr. Bradford in 6 months.  Sooner for problems or consultations.          Thank you for allowing me to participate in the care of your patient,      Sincerely,   JUAN DANIEL Adams  [facility]  03/19/19  2:36 PM    **Mario Disclaimer:**  Much of this encounter note is an electronic transcription/translation of spoken language to printed text. The electronic translation of spoken language may permit erroneous, or at times, nonsensical words or phrases to be inadvertently transcribed. Although I have reviewed the note for such errors, some may still exist.

## 2019-04-04 ENCOUNTER — HOSPITAL ENCOUNTER (OUTPATIENT)
Dept: CT IMAGING | Facility: HOSPITAL | Age: 49
Discharge: HOME | End: 2019-04-04
Admitting: NURSE PRACTITIONER

## 2019-04-04 LAB — CREAT BLDA-MCNC: 1 MG/DL (ref 0.6–1.3)

## 2019-04-04 PROCEDURE — 0 IOPAMIDOL PER 1 ML: Performed by: NURSE PRACTITIONER

## 2019-04-04 PROCEDURE — 71275 CT ANGIOGRAPHY CHEST: CPT

## 2019-04-04 PROCEDURE — 82565 ASSAY OF CREATININE: CPT

## 2019-04-04 RX ADMIN — IOPAMIDOL 100 ML: 755 INJECTION, SOLUTION INTRAVENOUS at 13:47

## 2019-04-11 DIAGNOSIS — R91.1 PULMONARY NODULE, LEFT: Primary | ICD-10-CM

## 2019-04-16 ENCOUNTER — TELEPHONE (OUTPATIENT)
Dept: FAMILY MEDICINE CLINIC | Facility: CLINIC | Age: 49
End: 2019-04-16

## 2019-04-16 DIAGNOSIS — R59.1 LYMPHADENOPATHY: ICD-10-CM

## 2019-04-16 DIAGNOSIS — R91.1 PULMONARY NODULE: Primary | ICD-10-CM

## 2019-04-16 NOTE — TELEPHONE ENCOUNTER
PATIENT IS NEEDING A REFERRAL TO [facility]. PATIENT HAD A CT DONE AT [facility] AND HIS SPECIALISTS PUT IN A REFERRAL BUT IT NEEDS TO COME FROM PRIMARY CARE FOR INSURANCE PURPOSES.      PLEASE CALL [phone]    PLEASE ADVISE

## 2019-05-01 ENCOUNTER — OFFICE VISIT (OUTPATIENT)
Dept: OTHER | Facility: HOSPITAL | Age: 49
End: 2019-05-01

## 2019-05-01 VITALS
SYSTOLIC BLOOD PRESSURE: 120 MMHG | HEIGHT: 69 IN | DIASTOLIC BLOOD PRESSURE: 83 MMHG | RESPIRATION RATE: 16 BRPM | TEMPERATURE: 98.4 F | WEIGHT: 234.8 LBS | HEART RATE: 70 BPM | OXYGEN SATURATION: 98 % | BODY MASS INDEX: 34.78 KG/M2

## 2019-05-01 DIAGNOSIS — R91.1 LUNG NODULE: Primary | ICD-10-CM

## 2019-05-01 DIAGNOSIS — G47.37 CENTRAL SLEEP APNEA DUE TO MEDICAL CONDITION: ICD-10-CM

## 2019-05-01 PROCEDURE — 99204 OFFICE O/P NEW MOD 45 MIN: CPT | Performed by: THORACIC SURGERY (CARDIOTHORACIC VASCULAR SURGERY)

## 2019-05-01 PROCEDURE — G0463 HOSPITAL OUTPT CLINIC VISIT: HCPCS | Performed by: THORACIC SURGERY (CARDIOTHORACIC VASCULAR SURGERY)

## 2019-05-01 NOTE — PROGRESS NOTES
Subjective   Patient ID: Gaurav Mora is a 48 y.o. male is being seen for consultation today at the request of PENNY Hartman    History of Present Illness  Dear Colleague,  Gaurav Mora was seen in our multidisciplinary lung cancer clinic today  In consultation for a new left upper lobe 8 mm nodule.  Mr. Mora is a very pleasant 48-year-old gentleman who underwent a CT of his chest to evaluate his a sending aorta and was found to have a lung nodule.  He is asymptomatic from this nodule.  Mr. Mora has a history of sleep apnea and is on a CPAP machine.  He complains of sinus congestion after meals and at night otherwise he is asymptomatic.  He has a history of a stroke in 2017 secondary to his uncontrolled hypertension.    Mr. Mora is a never smoker.  He is able to walk greater than a block or up a flight of stairs without difficulty.  The following portions of the patient's history were reviewed and updated as appropriate: allergies, current medications, past family history, past medical history, past social history, past surgical history and problem list.  Review of Systems   Constitution: Negative.   HENT: Positive for congestion.    Eyes: Positive for vision loss in right eye.   Cardiovascular: Negative.    Respiratory: Negative.    Endocrine: Negative.    Hematologic/Lymphatic: Negative.    Skin: Negative.    Musculoskeletal: Negative.    Gastrointestinal: Negative.    Genitourinary: Negative.    Neurological: Positive for numbness.   Psychiatric/Behavioral: Negative.    Allergic/Immunologic: Negative.    All other systems reviewed and are negative.    Patient Active Problem List   Diagnosis   • Essential hypertension   • Alteration of sensations, post-stroke   • Expressive aphasia   • Obstructive sleep apnea   • Hyperlipidemia LDL goal <70   • Slow transit constipation     Past Medical History:   Diagnosis Date   • Headache, tension-type    • Hemorrhagic stroke (CMS/HCC) 11/24/2017    Residual  peripheral vision loss, right-sided sensation loss   • Hypertension    • Lung nodule    • SHANE on CPAP    • Stroke (CMS/HCC)      Past Surgical History:   Procedure Laterality Date   • APPENDECTOMY       Family History   Problem Relation Age of Onset   • Breast cancer Mother    • Atrial fibrillation Father    • Hypertension Father    • Colon cancer Paternal Grandmother    • Heart disease Maternal Grandfather    • Heart disease Maternal Aunt    • Heart disease Maternal Uncle      Social History     Socioeconomic History   • Marital status: Single     Spouse name: Not on file   • Number of children: 0   • Years of education: Not on file   • Highest education level: Not on file   Occupational History   • Occupation: disability   Tobacco Use   • Smoking status: Never Smoker   • Smokeless tobacco: Never Used   • Tobacco comment: minimal caffeine   Substance and Sexual Activity   • Alcohol use: No   • Drug use: No   • Sexual activity: Defer       Current Outpatient Medications:   •  acetaminophen (TYLENOL) 325 MG tablet, Take 650 mg by mouth Every 4 (Four) Hours As Needed for Mild Pain ., Disp: , Rfl:   •  amLODIPine (NORVASC) 10 MG tablet, Take 1 tablet by mouth Daily., Disp: 90 tablet, Rfl: 3  •  aspirin 81 MG tablet, Take 81 mg by mouth Daily., Disp: , Rfl:   •  docusate sodium (COLACE) 100 MG capsule, Take 1 capsule by mouth Daily., Disp: 90 capsule, Rfl: 3  •  labetalol (NORMODYNE) 100 MG tablet, Take 1 tablet by mouth 3 (Three) Times a Day., Disp: 270 tablet, Rfl: 3  •  lisinopril (PRINIVIL,ZESTRIL) 20 MG tablet, Take 1 tablet by mouth Daily., Disp: 90 tablet, Rfl: 3  •  montelukast (SINGULAIR) 10 MG tablet, Take 1 tablet by mouth Every Night., Disp: 30 tablet, Rfl: 6  •  rosuvastatin (CRESTOR) 10 MG tablet, Take 1 tablet by mouth Every Night., Disp: 90 tablet, Rfl: 3  •  spironolactone (ALDACTONE) 25 MG tablet, Take 1 tablet by mouth Daily., Disp: 90 tablet, Rfl: 3  No Known Allergies     Objective   Vitals:     05/01/19 1133   BP: 120/83   Pulse: 70   Resp: 16   Temp: 98.4 °F (36.9 °C)   SpO2: 98%     Physical Exam   Constitutional: He is oriented to person, place, and time. He appears well-developed and well-nourished.   HENT:   Head: Normocephalic and atraumatic.   Nose: Nose normal.   Mouth/Throat: Oropharynx is clear and moist.   Eyes: Conjunctivae and EOM are normal. Pupils are equal, round, and reactive to light.   Neck: Normal range of motion. Neck supple.   Cardiovascular: Normal rate, regular rhythm, normal heart sounds and intact distal pulses.   Pulmonary/Chest: Effort normal and breath sounds normal.   Abdominal: Soft. Bowel sounds are normal.   Musculoskeletal: Normal range of motion.   Neurological: He is alert and oriented to person, place, and time.   Skin: Skin is warm and dry. Capillary refill takes less than 2 seconds.   Psychiatric: He has a normal mood and affect. His behavior is normal. Judgment and thought content normal.   Nursing note and vitals reviewed.    Independent Review of Radiographic Studies:    I have independently reviewed the CTA of his chest performed on 4/4/2019 which demonstrates an 8 mm left upper lobe lung nodule and 0.9 cm left hilar lymph node.  There is no mediastinal lymphadenopathy, effusion.  Assessment/Plan   Mr. Mora is a pleasant 48-year-old gentleman with a left upper lobe lung nodule measuring 8 mm.  This lesion could represent a malignancy or an inflammatory nodule.  It is too small to biopsy at this point and he is low risk considering he is a never smoker.  We will plan to see him in 4 months with a CT chest to reevaluate this nodule per Fleischner guidelines.    We will also plan to have him see pulmonology to help with his CPAP settings.    Diagnoses and all orders for this visit:    Lung nodule  -     CT Chest Without Contrast; Future    Central sleep apnea due to medical condition  -     Ambulatory Referral to Sleep Medicine

## 2019-05-17 ENCOUNTER — OFFICE VISIT (OUTPATIENT)
Dept: FAMILY MEDICINE CLINIC | Facility: CLINIC | Age: 49
End: 2019-05-17

## 2019-05-17 VITALS
DIASTOLIC BLOOD PRESSURE: 72 MMHG | SYSTOLIC BLOOD PRESSURE: 110 MMHG | BODY MASS INDEX: 34.07 KG/M2 | HEART RATE: 64 BPM | WEIGHT: 230 LBS | TEMPERATURE: 98.5 F | HEIGHT: 69 IN | OXYGEN SATURATION: 99 % | RESPIRATION RATE: 16 BRPM

## 2019-05-17 DIAGNOSIS — R20.9 ALTERATION OF SENSATIONS, POST-STROKE: ICD-10-CM

## 2019-05-17 DIAGNOSIS — I69.30 HISTORY OF HEMORRHAGIC CEREBROVASCULAR ACCIDENT (CVA) WITH RESIDUAL DEFICIT: ICD-10-CM

## 2019-05-17 DIAGNOSIS — I69.398 ALTERATION OF SENSATIONS, POST-STROKE: ICD-10-CM

## 2019-05-17 DIAGNOSIS — I10 ESSENTIAL HYPERTENSION: Primary | ICD-10-CM

## 2019-05-17 DIAGNOSIS — J30.2 SEASONAL ALLERGIC RHINITIS, UNSPECIFIED TRIGGER: ICD-10-CM

## 2019-05-17 DIAGNOSIS — I61.9 HEMORRHAGIC STROKE (HCC): ICD-10-CM

## 2019-05-17 DIAGNOSIS — E78.5 HYPERLIPIDEMIA LDL GOAL <70: ICD-10-CM

## 2019-05-17 DIAGNOSIS — G47.33 OBSTRUCTIVE SLEEP APNEA: ICD-10-CM

## 2019-05-17 DIAGNOSIS — R47.01 EXPRESSIVE APHASIA: ICD-10-CM

## 2019-05-17 PROCEDURE — 99213 OFFICE O/P EST LOW 20 MIN: CPT | Performed by: PHYSICIAN ASSISTANT

## 2019-05-17 RX ORDER — LORATADINE 10 MG/1
10 TABLET ORAL DAILY
Qty: 30 TABLET | Refills: 0 | Status: SHIPPED | OUTPATIENT
Start: 2019-05-17 | End: 2019-05-17 | Stop reason: SDUPTHER

## 2019-05-17 RX ORDER — TRIAMCINOLONE ACETONIDE 55 UG/1
1 SPRAY, METERED NASAL DAILY
Qty: 3 BOTTLE | Refills: 1 | Status: SHIPPED | OUTPATIENT
Start: 2019-05-17 | End: 2019-05-22 | Stop reason: RX

## 2019-05-17 RX ORDER — TRIAMCINOLONE ACETONIDE 55 UG/1
1 SPRAY, METERED NASAL DAILY
Qty: 1 BOTTLE | Refills: 0 | Status: SHIPPED | OUTPATIENT
Start: 2019-05-17 | End: 2019-05-17 | Stop reason: SDUPTHER

## 2019-05-17 RX ORDER — LORATADINE 10 MG/1
10 TABLET ORAL DAILY
Qty: 90 TABLET | Refills: 1 | Status: SHIPPED | OUTPATIENT
Start: 2019-05-17 | End: 2019-12-03 | Stop reason: SDUPTHER

## 2019-05-17 NOTE — PROGRESS NOTES
"Prosper Mora is a 48 y.o. male present today to complete disability paperwork.     History of Present Illness     Patient needs paperwork completed for disability.     Neurology- Dr Garner- last appt 11/2018- follow up in 1 year. From note: \"history of stroke associated with extremely high blood pressure on 11/23/17.  He was in [place] when this occurred.  He was taken from the [facility] to a stroke center [facility] and saw Dr. Orona and subsequently saw Dr. Viviane rosales in June of this year.  The patient had a left basal ganglia/temporal lobe hemorrhage with some intraventricular hemorrhage present.  He had an angiogram which did not show evidence of an AVM.  The anterior communicating artery was small and no posterior communicating arteries were identified.  The stroke caused a right hemiparesis.  He states that his strength has come back to near normal still has numbness on the right side and a visual field cut on the right side.  He has some minor speech changes which have persisted.  His blood pressure has been under excellent control.  He now lives locally with his parents.  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  He continues on disability.  He is service-connected in the  for his hypertension and so his brain hemorrhage would be also service-connected.  He also has obstructive sleep apnea and uses a CPAP machine.  After moving back here with his parents he has lost about 60-70 pounds.  His blood pressure is checked on a frequent basis.  I reviewed the blood pressure chart showing a lot of blood pressures in the 110-120 range systolic.  Occasionally the systolic dips below 100 but is not symptomatic according to his father who was with him to help with the history.\"    Multidisciplinary lung clinic- Dr Palomares- last appt 5/2019 for pulmonary nodule- follow up and recheck in 4 months. From note: \"left upper lobe lung nodule measuring 8 mm.  This " "lesion could represent a malignancy or an inflammatory nodule.  It is too small to biopsy at this point and he is low risk considering he is a never smoker.  We will plan to see him in 4 months with a CT chest to reevaluate this nodule per Fleischner guidelines.\"    Cardiology- JUAN DANIEL Hair and Dr Mcgarry- for dilated aortic root and history of hemorrhagic CVA due to uncontrolled BP- last appt 3/2019- follow up in 6 months.     Has had some increased congestion with change in weather that they thinks is related to allergies. No fever, V, D, illness.     He has been doing pretty well without other new complaints.     The following portions of the patient's history were reviewed and updated as appropriate: allergies, current medications, past family history, past medical history, past social history, past surgical history and problem list.    Review of Systems   All other systems reviewed and are negative.      Objective   Physical Exam   Constitutional: He is oriented to person, place, and time. He appears well-developed.   HENT:   Head: Normocephalic and atraumatic.   Right Ear: External ear normal.   Left Ear: External ear normal.   Eyes: Conjunctivae are normal.   Neck: Carotid bruit is not present. No tracheal deviation present. No thyroid mass and no thyromegaly present.   Cardiovascular: Normal rate, regular rhythm, normal heart sounds and intact distal pulses.   Pulmonary/Chest: Effort normal and breath sounds normal.   Neurological: He is alert and oriented to person, place, and time. Gait normal.   Skin: Skin is warm and dry.   Psychiatric: He has a normal mood and affect. His behavior is normal. Judgment and thought content normal.   Nursing note and vitals reviewed.      Assessment/Plan   Gaurav was seen today for disability paperwork.    Diagnoses and all orders for this visit:    Essential hypertension    Hyperlipidemia LDL goal <70    History of hemorrhagic cerebrovascular accident (CVA) with " residual deficit    Obstructive sleep apnea    Hemorrhagic stroke (CMS/HCC)    Alteration of sensations, post-stroke    Expressive aphasia    Seasonal allergic rhinitis, unspecified trigger    Other orders  -     Discontinue: Triamcinolone Acetonide (NASACORT ALLERGY 24HR) 55 MCG/ACT nasal inhaler; 1 spray into the nostril(s) as directed by provider Daily.  -     Discontinue: loratadine (CLARITIN) 10 MG tablet; Take 1 tablet by mouth Daily.  -     Discontinue: Triamcinolone Acetonide (NASACORT ALLERGY 24HR) 55 MCG/ACT nasal inhaler; 1 spray into the nostril(s) as directed by provider Daily.  -     loratadine (CLARITIN) 10 MG tablet; Take 1 tablet by mouth Daily.      Patient Instructions   48 year old male who presents today in follow up of HTN, hyperlipidemia, history of CVA with residual deficit, and to have his disability paperwork completed. Per father and patient, he is capable of handling his own finances. Patient was previously following with JUAN DANIEL Mccoy and is establishing with me with her leaving practice. He apparently had significant hemorrhagic CVA due to uncontrolled BP with residual deficits in 11/2017. His baseline prior to CVA is unknown to me, as I did not know him prior to CVA. He is doing well without CP, SOA, Palp, Neuro symptoms.  Blood pressure today is controlled- continue Lisinopril 20 mg once daily, amlodipine 10 mg once daily, and Labetalol 100 mg 3 x daily.Disability paperwork signed today for managing benefits. I will give this to staff to then send records with forms. Patient is not fasting today. He will complete fasting labs at his next follow up in 4 months.     Patient has had increased allergy congestion recently. He should try Nasacort 2 sprays in each nostril once daily and Claritin 10 mg once daily. To call or return if no improvement, new or changing symptoms.

## 2019-05-22 RX ORDER — FLUTICASONE PROPIONATE 50 MCG
1 SPRAY, SUSPENSION (ML) NASAL DAILY
Qty: 3 BOTTLE | Refills: 1 | Status: SHIPPED | OUTPATIENT
Start: 2019-05-22 | End: 2020-03-11

## 2019-05-27 PROBLEM — I61.9 HEMORRHAGIC STROKE (HCC): Status: ACTIVE | Noted: 2017-11-24

## 2019-05-28 NOTE — PATIENT INSTRUCTIONS
48 year old male who presents today in follow up of HTN, hyperlipidemia, history of CVA with residual deficit, and to have his disability paperwork completed. Per father and patient, he is capable of handling his own finances. Patient was previously following with JUAN DANIEL Mccoy and is establishing with me with her leaving practice. He apparently had significant hemorrhagic CVA due to uncontrolled BP with residual deficits in 11/2017. His baseline prior to CVA is unknown to me, as I did not know him prior to CVA. He is doing well without CP, SOA, Palp, Neuro symptoms.  Blood pressure today is controlled- continue Lisinopril 20 mg once daily, amlodipine 10 mg once daily, and Labetalol 100 mg 3 x daily.Disability paperwork signed today for managing benefits. I will give this to staff to then send records with forms. Patient is not fasting today. He will complete fasting labs at his next follow up in 4 months.     Patient has had increased allergy congestion recently. He should try Nasacort 2 sprays in each nostril once daily and Claritin 10 mg once daily. To call or return if no improvement, new or changing symptoms.

## 2019-05-29 ENCOUNTER — OFFICE VISIT (OUTPATIENT)
Dept: SLEEP MEDICINE | Facility: HOSPITAL | Age: 49
End: 2019-05-29

## 2019-05-29 VITALS
DIASTOLIC BLOOD PRESSURE: 78 MMHG | WEIGHT: 237.2 LBS | HEART RATE: 67 BPM | OXYGEN SATURATION: 97 % | BODY MASS INDEX: 35.13 KG/M2 | SYSTOLIC BLOOD PRESSURE: 118 MMHG | HEIGHT: 69 IN

## 2019-05-29 DIAGNOSIS — I61.9 HEMORRHAGIC STROKE (HCC): ICD-10-CM

## 2019-05-29 DIAGNOSIS — G47.33 OSA ON CPAP: Primary | ICD-10-CM

## 2019-05-29 DIAGNOSIS — E66.9 OBESITY (BMI 30-39.9): ICD-10-CM

## 2019-05-29 DIAGNOSIS — R91.1 LUNG NODULE: ICD-10-CM

## 2019-05-29 DIAGNOSIS — Z99.89 OSA ON CPAP: Primary | ICD-10-CM

## 2019-05-29 PROCEDURE — G0463 HOSPITAL OUTPT CLINIC VISIT: HCPCS

## 2019-05-29 NOTE — PROGRESS NOTES
[facility]  Telephone: [phone] / Fax: [phone] [place]  Telephone: [phone] / Fax: [phone] Tasha Foster    Referring Physician: Suzette  PCP: Rebecca Nixon PA    Reason for consult:  sleep apnea    Gaurav Mora is a 48 y.o.male  was seen in the Sleep Disorders Center today for evaluation of sleep apnea.  He reports history of stroke with right sided numbness and loss of visual field on the right + loss of peripheral vision on the right side. Shortly later he was diagnosed with SHANE. Study was done on March 17 2018 at [facility]. I do not have a copy of his sleep study. He has been using the CPAP ever since. Device is set at 8-12cm and it works well. He is here today to get established with sleep provider so that he can replace CPAP accessories locally. His dad would like us to use [facility]. His current machine is 1 year old.  He is following with Dr. Bradford for HTN.  He was also discovered to have MADONNA 8mm solitary pulmonary nodule that was recently evaluated with CT chest. CT chest also showed left hilar adenopathy.  He has seen thoracic surgeon and repeat CT chest was scheduled for September 2019.  His sleep schedule is 11 PM-9 AM.    Social history, no tobacco, no alcohol.    Review of systems, positive cough.  Rest is negative.    Gaurav Mora  has a past medical history of Headache, tension-type, Hemorrhagic stroke (CMS/HCC) (11/24/2017), Hypertension, Lung nodule, SHANE on CPAP, and Stroke (CMS/HCC).    Current Medications:    Current Outpatient Medications:   •  acetaminophen (TYLENOL) 325 MG tablet, Take 650 mg by mouth Every 4 (Four) Hours As Needed for Mild Pain ., Disp: , Rfl:   •  amLODIPine (NORVASC) 10 MG tablet, Take 1 tablet by mouth Daily., Disp: 90 tablet, Rfl: 3  •  aspirin 81 MG tablet, Take 81 mg by mouth Daily., Disp: , Rfl:   •  fluticasone (FLONASE) 50 MCG/ACT nasal spray, 1 spray into the nostril(s) as directed by  "provider Daily., Disp: 3 bottle, Rfl: 1  •  labetalol (NORMODYNE) 100 MG tablet, Take 1 tablet by mouth 3 (Three) Times a Day., Disp: 270 tablet, Rfl: 3  •  lisinopril (PRINIVIL,ZESTRIL) 20 MG tablet, Take 1 tablet by mouth Daily., Disp: 90 tablet, Rfl: 3  •  loratadine (CLARITIN) 10 MG tablet, Take 1 tablet by mouth Daily., Disp: 90 tablet, Rfl: 1  •  rosuvastatin (CRESTOR) 10 MG tablet, Take 1 tablet by mouth Every Night., Disp: 90 tablet, Rfl: 3  •  spironolactone (ALDACTONE) 25 MG tablet, Take 1 tablet by mouth Daily., Disp: 90 tablet, Rfl: 3    I have reviewed Past Medical History, Past Surgical History, Medication List, Social History and Family History as entered in Sleep Questionnaire and EPIC.    ESS  7   Vital Signs /78 (BP Location: Left arm, Patient Position: Sitting)   Pulse 67   Ht 175.3 cm (69\")   Wt 108 kg (237 lb 3.2 oz)   SpO2 97%   BMI 35.03 kg/m²  Body mass index is 35.03 kg/m².    General Alert and oriented. No acute distress noted   Pharynx/Throat Class IV Mallampati airway, large tongue, no evidence of redundant lateral pharyngeal tissue. No oral lesions. No thrush. Moist mucous membranes.   Head Normocephalic. Symmetrical. Atraumatic.    Nose No septal deviation. No drainage   Chest Wall Normal shape. Symmetric expansion with respiration. No tenderness.   Neck Trachea midline, no thyromegaly or adenopathy    Lungs Clear to auscultation bilaterally. No wheezes. No rhonchi. No rales. Respirations regular, even and unlabored.   Heart Regular rhythm and normal rate. Normal S1 and S2. No murmur   Abdomen Soft, non-tender and non-distended. Normal bowel sounds. No masses.   Extremities Moves all extremities well. No edema   Psychiatric Normal mood and affect.       CT chest 4/2019  Cardiac size within normal limits, no pericardial abnormality. The  esophagus is satisfactory in appearance. Solitary enlarged 1.9 cm left  hilar lymph node. No right hilar enlarged lymph nodes. No " mediastinal  adenopathy. Located within the left upper lobe on image 69 is an 8 mm  noncalcified pulmonary nodule. The left lung is otherwise clear. There  is a very small curvilinear area of pleural thickening along the  anterior right middle lobe. A similar area of pleural thickening is also  demonstrated at the right lung apex.                   Impression:  1. SHANE on CPAP    2. Lung nodule    3. Hemorrhagic stroke (CMS/HCC)    4. Obesity (BMI 30-39.9)          Plan:  I will establish him with [facility] for CPAP supplies. He needs new mask and filters. I will request a copy of his sleep study from [place].  I reviewed CT of the chest done recently.  I advised him to get a repeat CT in September to ensure there is no growth of lung nodule or worsening adenopathy in the left hilum.  He plans to keep his follow-up appointment with thoracic surgery.  He has history of stroke and visual field loss on the right side.  Compliance with CPAP device is strongly advised.  I reviewed smartcard download dates February 28, 2019-May 28, 2019, compliance has been excellent.  Average nightly use is 8 hours and 42 minutes on auto CPAP 8-12 cm, average AHI 0.6.    F/u with Dr. Garcia in 6 months    Thank you for allowing me to participate in your patient's care.    Addendum:  Sleep study report from 3/16/18 received-AHI 56, RDI 57, corrected best with CPAP 10cm.    JUAN DANIEL Yeboah  [facility]  Phone: [phone]      Part of this note may be an electronic transcription/translation of spoken language to printed text using the Dragon Dictation System. Some errors may exist even though the document was edited.

## 2019-07-09 ENCOUNTER — TELEPHONE (OUTPATIENT)
Dept: CARDIOLOGY | Facility: CLINIC | Age: 49
End: 2019-07-09

## 2019-09-09 ENCOUNTER — HOSPITAL ENCOUNTER (OUTPATIENT)
Dept: CT IMAGING | Facility: HOSPITAL | Age: 49
Discharge: HOME | End: 2019-09-09
Admitting: THORACIC SURGERY (CARDIOTHORACIC VASCULAR SURGERY)

## 2019-09-09 DIAGNOSIS — R91.1 LUNG NODULE: ICD-10-CM

## 2019-09-09 PROCEDURE — 71250 CT THORAX DX C-: CPT

## 2019-09-16 ENCOUNTER — OFFICE VISIT (OUTPATIENT)
Dept: SURGERY | Facility: CLINIC | Age: 49
End: 2019-09-16

## 2019-09-16 VITALS
DIASTOLIC BLOOD PRESSURE: 74 MMHG | HEART RATE: 59 BPM | HEIGHT: 69 IN | BODY MASS INDEX: 34.8 KG/M2 | WEIGHT: 235 LBS | OXYGEN SATURATION: 98 % | SYSTOLIC BLOOD PRESSURE: 106 MMHG

## 2019-09-16 DIAGNOSIS — R91.1 LUNG NODULE: Primary | ICD-10-CM

## 2019-09-16 PROCEDURE — 99213 OFFICE O/P EST LOW 20 MIN: CPT | Performed by: THORACIC SURGERY (CARDIOTHORACIC VASCULAR SURGERY)

## 2019-09-16 NOTE — PROGRESS NOTES
Subjective   Patient ID: Gaurav Mora is a 49 y.o. male is here today for follow-up.    History of Present Illness  Dear Colleague,  Gaurav Mora was seen in our office today for continued follow up and surveillance for a lung nodule.  Mr. Mora is a very pleasant 48-year-old gentleman who underwent a CT of his chest to evaluate his a sending aorta and was found to have a lung nodule.  He is asymptomatic from this nodule.  Mr. Mora has a history of sleep apnea and is on a CPAP machine.  He complains of sinus congestion after meals and at night otherwise he is asymptomatic.  He has a history of a stroke in 2017 secondary to his uncontrolled hypertension.     Mr. Mora is a never smoker.  He is able to walk greater than a block or up a flight of stairs without difficulty.    Mr. Mora presents today for a CT scan follow-up.  He has no new complaints and is doing well.    The following portions of the patient's history were reviewed and updated as appropriate: allergies, current medications, past family history, past medical history, past social history, past surgical history and problem list.  Review of Systems   Constitution: Negative.   HENT: Negative.    Eyes: Negative.    Cardiovascular: Negative.    Respiratory: Negative.    Endocrine: Negative.    Hematologic/Lymphatic: Negative.    Skin: Negative.    Musculoskeletal: Negative.    Gastrointestinal: Negative.    Genitourinary: Negative.    Neurological: Negative.    Psychiatric/Behavioral: Negative.    Allergic/Immunologic: Negative.      Patient Active Problem List   Diagnosis   • Essential hypertension   • Alteration of sensations, post-stroke   • Expressive aphasia   • Obstructive sleep apnea   • Hyperlipidemia LDL goal <70   • Slow transit constipation   • Hemorrhagic stroke (CMS/HCC)     Past Medical History:   Diagnosis Date   • Headache, tension-type    • Hemorrhagic stroke (CMS/HCC) 11/24/2017    Residual peripheral vision loss, right-sided sensation  loss   • Hypertension    • Lung nodule    • SHANE on CPAP    • Stroke (CMS/HCC)      Past Surgical History:   Procedure Laterality Date   • APPENDECTOMY       Family History   Problem Relation Age of Onset   • Breast cancer Mother    • Atrial fibrillation Father    • Hypertension Father    • Colon cancer Paternal Grandmother    • Heart disease Maternal Grandfather    • Heart disease Maternal Aunt    • Heart disease Maternal Uncle      Social History     Socioeconomic History   • Marital status: Single     Spouse name: Not on file   • Number of children: 0   • Years of education: Not on file   • Highest education level: Not on file   Occupational History   • Occupation: disability   Tobacco Use   • Smoking status: Never Smoker   • Smokeless tobacco: Never Used   • Tobacco comment: minimal caffeine   Substance and Sexual Activity   • Alcohol use: No   • Drug use: No   • Sexual activity: Defer       Current Outpatient Medications:   •  amLODIPine (NORVASC) 10 MG tablet, Take 1 tablet by mouth Daily., Disp: 90 tablet, Rfl: 3  •  aspirin 81 MG tablet, Take 81 mg by mouth Daily., Disp: , Rfl:   •  fluticasone (FLONASE) 50 MCG/ACT nasal spray, 1 spray into the nostril(s) as directed by provider Daily., Disp: 3 bottle, Rfl: 1  •  labetalol (NORMODYNE) 100 MG tablet, Take 1 tablet by mouth 3 (Three) Times a Day., Disp: 270 tablet, Rfl: 3  •  lisinopril (PRINIVIL,ZESTRIL) 20 MG tablet, Take 1 tablet by mouth Daily., Disp: 90 tablet, Rfl: 3  •  loratadine (CLARITIN) 10 MG tablet, Take 1 tablet by mouth Daily., Disp: 90 tablet, Rfl: 1  •  rosuvastatin (CRESTOR) 10 MG tablet, Take 1 tablet by mouth Every Night., Disp: 90 tablet, Rfl: 3  •  spironolactone (ALDACTONE) 25 MG tablet, Take 1 tablet by mouth Daily., Disp: 90 tablet, Rfl: 3  •  acetaminophen (TYLENOL) 325 MG tablet, Take 650 mg by mouth Every 4 (Four) Hours As Needed for Mild Pain ., Disp: , Rfl:   No Known Allergies     Objective   Vitals:    09/16/19 0954   BP: 106/74    Pulse: 59   SpO2: 98%     Physical Exam   Constitutional: He is oriented to person, place, and time. He appears well-developed and well-nourished.   HENT:   Head: Normocephalic and atraumatic.   Nose: Nose normal.   Mouth/Throat: Oropharynx is clear and moist.   Eyes: Conjunctivae and EOM are normal. Pupils are equal, round, and reactive to light.   Neck: Normal range of motion. Neck supple.   Cardiovascular: Normal rate, regular rhythm, normal heart sounds and intact distal pulses.   Pulmonary/Chest: Effort normal and breath sounds normal.   Abdominal: Soft. Bowel sounds are normal.   Musculoskeletal: Normal range of motion.   Neurological: He is alert and oriented to person, place, and time.   Skin: Skin is warm and dry. Capillary refill takes less than 2 seconds.   Psychiatric: He has a normal mood and affect. His behavior is normal. Judgment and thought content normal.   Nursing note and vitals reviewed.    Independent Review of Radiographic Studies:    I have independently reviewed the CT chest performed on 9/12/2019 which demonstrates a stable left upper lobe pulmonary nodule measuring 0.8 cm.  There is also a very small additional nodule in the left upper lobe measuring less than 5 mm is also unchanged.  There is no emphysema, no pneumothorax, no pleural effusion.  Assessment/Plan   Mr. Mora is a pleasant 49-year-old gentleman with a left upper lobe pulmonary nodule measuring 0.8 cm.  This nodule is unchanged since April 2019.  Given its size greater than 0.5 cm, he will need continued surveillance with a CT chest in 6 months per Fleischner guidelines.  This nodule in all likelihood is benign however he will need surveillance for 3 years.  This was discussed with Mr. Mora today and he will plan to see me in 6 months with a CT chest.    Diagnoses and all orders for this visit:    Lung nodule  -     CT Chest Without Contrast; Future

## 2019-09-17 ENCOUNTER — OFFICE VISIT (OUTPATIENT)
Dept: FAMILY MEDICINE CLINIC | Facility: CLINIC | Age: 49
End: 2019-09-17

## 2019-09-17 VITALS
BODY MASS INDEX: 36.35 KG/M2 | SYSTOLIC BLOOD PRESSURE: 104 MMHG | TEMPERATURE: 98.8 F | WEIGHT: 245.4 LBS | HEIGHT: 69 IN | OXYGEN SATURATION: 99 % | HEART RATE: 64 BPM | DIASTOLIC BLOOD PRESSURE: 72 MMHG

## 2019-09-17 DIAGNOSIS — G47.33 OBSTRUCTIVE SLEEP APNEA: ICD-10-CM

## 2019-09-17 DIAGNOSIS — R91.1 PULMONARY NODULE: ICD-10-CM

## 2019-09-17 DIAGNOSIS — I69.30 HISTORY OF HEMORRHAGIC CEREBROVASCULAR ACCIDENT (CVA) WITH RESIDUAL DEFICIT: ICD-10-CM

## 2019-09-17 DIAGNOSIS — R47.01 EXPRESSIVE APHASIA: ICD-10-CM

## 2019-09-17 DIAGNOSIS — E78.5 HYPERLIPIDEMIA LDL GOAL <70: ICD-10-CM

## 2019-09-17 DIAGNOSIS — I10 ESSENTIAL HYPERTENSION: Primary | ICD-10-CM

## 2019-09-17 PROCEDURE — 99214 OFFICE O/P EST MOD 30 MIN: CPT | Performed by: PHYSICIAN ASSISTANT

## 2019-09-17 NOTE — PROGRESS NOTES
Subjective   Gaurav Mora is a 49 y.o. male here today for medication management for hypertension, hyperlipidemia    History of Present Illness     7/11/19 disability note. They will call back with new fax number.     Patient has been doing well without complaints.  He is tolerating medications without AE.  No concerns today    The following portions of the patient's history were reviewed and updated as appropriate: allergies, current medications, past family history, past medical history, past social history, past surgical history and problem list.    Review of Systems   All other systems reviewed and are negative.      Objective   Physical Exam   Constitutional: He is oriented to person, place, and time. He appears well-developed.   HENT:   Head: Normocephalic and atraumatic.   Right Ear: External ear normal.   Left Ear: External ear normal.   Eyes: Conjunctivae are normal.   Neck: Carotid bruit is not present. No tracheal deviation present. No thyroid mass and no thyromegaly present.   Cardiovascular: Normal rate, regular rhythm, normal heart sounds and intact distal pulses.   Pulmonary/Chest: Effort normal and breath sounds normal.   Neurological: He is alert and oriented to person, place, and time. Gait normal.   Skin: Skin is warm and dry.   Psychiatric: He has a normal mood and affect. His behavior is normal. Judgment and thought content normal.   Nursing note and vitals reviewed.      Assessment/Plan   Gaurav was seen today for med management.    Diagnoses and all orders for this visit:    Essential hypertension  -     Comprehensive Metabolic Panel    Hyperlipidemia LDL goal <70  -     Comprehensive Metabolic Panel  -     CK  -     Lipid Panel With LDL / HDL Ratio    History of hemorrhagic cerebrovascular accident (CVA) with residual deficit  -     CBC & Differential  -     Comprehensive Metabolic Panel  -     CK  -     Lipid Panel With LDL / HDL Ratio  -     Vitamin D 25 Hydroxy  -     Urinalysis With  Microscopic - Urine, Clean Catch  -     PSA Screen    Obstructive sleep apnea  -     CBC & Differential  -     Comprehensive Metabolic Panel  -     CK  -     Lipid Panel With LDL / HDL Ratio  -     Vitamin D 25 Hydroxy  -     Urinalysis With Microscopic - Urine, Clean Catch  -     PSA Screen    Expressive aphasia  -     CBC & Differential  -     Comprehensive Metabolic Panel  -     CK  -     Lipid Panel With LDL / HDL Ratio  -     Vitamin D 25 Hydroxy  -     Urinalysis With Microscopic - Urine, Clean Catch  -     PSA Screen    Pulmonary nodule    Other orders  -     Microscopic Examination -      Patient Instructions   48 year old male who presents today in follow up of HTN, hyperlipidemia, and history of CVA with residual deficit. Per father and patient, he is capable of handling his own finances. Patient was previously following with JUAN DANIEL Mccoy and established with me in May 2019 with her leaving practice. He apparently had significant hemorrhagic CVA due to uncontrolled BP with residual deficits in 11/2017. His baseline prior to CVA is unknown to me, as I did not know him prior to CVA. He is doing well without CP, SOA, Palp, Neuro symptoms, or other complaints.  He is tolerating medications well.  Blood pressure today is controlled- continue Lisinopril 20 mg once daily, amlodipine 10 mg once daily, and Labetalol 100 mg 3 x daily.Disability paperwork signed at last visit for managing benefits. Apparently this was never received by them. We will resend form with notes. Patient to have fasting labs today.  Stability of conditions, plan, follow-up, and further recommendations pending labs.  If stable, follow-up in 4 to 6 months.

## 2019-09-18 ENCOUNTER — TELEPHONE (OUTPATIENT)
Dept: FAMILY MEDICINE CLINIC | Facility: CLINIC | Age: 49
End: 2019-09-18

## 2019-09-18 LAB
25(OH)D3+25(OH)D2 SERPL-MCNC: 11.8 NG/ML (ref 30–100)
ALBUMIN SERPL-MCNC: 5.1 G/DL (ref 3.5–5.2)
ALBUMIN/GLOB SERPL: 2 G/DL
ALP SERPL-CCNC: 67 U/L (ref 39–117)
ALT SERPL-CCNC: 43 U/L (ref 1–41)
APPEARANCE UR: CLEAR
AST SERPL-CCNC: 30 U/L (ref 1–40)
BACTERIA #/AREA URNS HPF: ABNORMAL /HPF
BASOPHILS # BLD AUTO: 0.04 10*3/MM3 (ref 0–0.2)
BASOPHILS NFR BLD AUTO: 0.6 % (ref 0–1.5)
BILIRUB SERPL-MCNC: 0.5 MG/DL (ref 0.2–1.2)
BILIRUB UR QL STRIP: NEGATIVE
BUN SERPL-MCNC: 13 MG/DL (ref 6–20)
BUN/CREAT SERPL: 12.7 (ref 7–25)
CALCIUM SERPL-MCNC: 9.4 MG/DL (ref 8.6–10.5)
CASTS URNS MICRO: ABNORMAL
CHLORIDE SERPL-SCNC: 100 MMOL/L (ref 98–107)
CHOLEST SERPL-MCNC: 141 MG/DL (ref 0–200)
CK SERPL-CCNC: 1044 U/L (ref 20–200)
CO2 SERPL-SCNC: 25.4 MMOL/L (ref 22–29)
COLOR UR: YELLOW
CREAT SERPL-MCNC: 1.02 MG/DL (ref 0.76–1.27)
EOSINOPHIL # BLD AUTO: 0.13 10*3/MM3 (ref 0–0.4)
EOSINOPHIL NFR BLD AUTO: 1.8 % (ref 0.3–6.2)
EPI CELLS #/AREA URNS HPF: ABNORMAL /HPF
ERYTHROCYTE [DISTWIDTH] IN BLOOD BY AUTOMATED COUNT: 12.5 % (ref 12.3–15.4)
GLOBULIN SER CALC-MCNC: 2.5 GM/DL
GLUCOSE SERPL-MCNC: 91 MG/DL (ref 65–99)
GLUCOSE UR QL: NEGATIVE
HCT VFR BLD AUTO: 47.9 % (ref 37.5–51)
HDLC SERPL-MCNC: 47 MG/DL (ref 40–60)
HGB BLD-MCNC: 15.2 G/DL (ref 13–17.7)
HGB UR QL STRIP: NEGATIVE
IMM GRANULOCYTES # BLD AUTO: 0.03 10*3/MM3 (ref 0–0.05)
IMM GRANULOCYTES NFR BLD AUTO: 0.4 % (ref 0–0.5)
KETONES UR QL STRIP: NEGATIVE
LDLC SERPL CALC-MCNC: 84 MG/DL (ref 0–100)
LDLC/HDLC SERPL: 1.79 {RATIO}
LEUKOCYTE ESTERASE UR QL STRIP: NEGATIVE
LYMPHOCYTES # BLD AUTO: 2.03 10*3/MM3 (ref 0.7–3.1)
LYMPHOCYTES NFR BLD AUTO: 28.6 % (ref 19.6–45.3)
MCH RBC QN AUTO: 27.2 PG (ref 26.6–33)
MCHC RBC AUTO-ENTMCNC: 31.7 G/DL (ref 31.5–35.7)
MCV RBC AUTO: 85.7 FL (ref 79–97)
MONOCYTES # BLD AUTO: 0.55 10*3/MM3 (ref 0.1–0.9)
MONOCYTES NFR BLD AUTO: 7.8 % (ref 5–12)
NEUTROPHILS # BLD AUTO: 4.31 10*3/MM3 (ref 1.7–7)
NEUTROPHILS NFR BLD AUTO: 60.8 % (ref 42.7–76)
NITRITE UR QL STRIP: NEGATIVE
NRBC BLD AUTO-RTO: 0 /100 WBC (ref 0–0.2)
PH UR STRIP: 5.5 [PH] (ref 5–8)
PLATELET # BLD AUTO: 243 10*3/MM3 (ref 140–450)
POTASSIUM SERPL-SCNC: 4.6 MMOL/L (ref 3.5–5.2)
PROT SERPL-MCNC: 7.6 G/DL (ref 6–8.5)
PROT UR QL STRIP: NEGATIVE
PSA SERPL-MCNC: 0.41 NG/ML (ref 0–4)
RBC # BLD AUTO: 5.59 10*6/MM3 (ref 4.14–5.8)
RBC #/AREA URNS HPF: ABNORMAL /HPF
SODIUM SERPL-SCNC: 140 MMOL/L (ref 136–145)
SP GR UR: 1.02 (ref 1–1.03)
TRIGL SERPL-MCNC: 49 MG/DL (ref 0–150)
UROBILINOGEN UR STRIP-MCNC: (no result) MG/DL
VLDLC SERPL CALC-MCNC: 9.8 MG/DL
WBC # BLD AUTO: 7.09 10*3/MM3 (ref 3.4–10.8)
WBC #/AREA URNS HPF: ABNORMAL /HPF

## 2019-09-18 NOTE — PATIENT INSTRUCTIONS
48 year old male who presents today in follow up of HTN, hyperlipidemia, and history of CVA with residual deficit. Per father and patient, he is capable of handling his own finances. Patient was previously following with JUAN DANIEL Mccoy and established with me in May 2019 with her leaving practice. He apparently had significant hemorrhagic CVA due to uncontrolled BP with residual deficits in 11/2017. His baseline prior to CVA is unknown to me, as I did not know him prior to CVA. He is doing well without CP, SOA, Palp, Neuro symptoms, or other complaints.  He is tolerating medications well.  Blood pressure today is controlled- continue Lisinopril 20 mg once daily, amlodipine 10 mg once daily, and Labetalol 100 mg 3 x daily.Disability paperwork signed at last visit for managing benefits. Apparently this was never received by them. We will resend form with notes. Patient to have fasting labs today.  Stability of conditions, plan, follow-up, and further recommendations pending labs.  If stable, follow-up in 4 to 6 months.

## 2019-09-18 NOTE — TELEPHONE ENCOUNTER
----- Message from Linda Coronado sent at 9/18/2019 10:20 AM EDT -----  Contact: [phone]  Patient's dad Shyam called wanting to know if you faxed those records yesterday.  His best call back is [phone]

## 2019-09-26 ENCOUNTER — OFFICE VISIT (OUTPATIENT)
Dept: CARDIOLOGY | Facility: CLINIC | Age: 49
End: 2019-09-26

## 2019-09-26 VITALS
OXYGEN SATURATION: 98 % | HEIGHT: 69 IN | SYSTOLIC BLOOD PRESSURE: 108 MMHG | WEIGHT: 246.2 LBS | DIASTOLIC BLOOD PRESSURE: 74 MMHG | HEART RATE: 61 BPM | BODY MASS INDEX: 36.46 KG/M2

## 2019-09-26 DIAGNOSIS — M62.82 NON-TRAUMATIC RHABDOMYOLYSIS: Primary | ICD-10-CM

## 2019-09-26 DIAGNOSIS — R31.9 HEMATURIA, UNSPECIFIED TYPE: Primary | ICD-10-CM

## 2019-09-26 DIAGNOSIS — I61.9 HEMORRHAGIC CEREBROVASCULAR ACCIDENT (CVA) (HCC): ICD-10-CM

## 2019-09-26 DIAGNOSIS — R74.8 ELEVATED CK: Primary | ICD-10-CM

## 2019-09-26 DIAGNOSIS — I10 ESSENTIAL HYPERTENSION: ICD-10-CM

## 2019-09-26 PROCEDURE — 99214 OFFICE O/P EST MOD 30 MIN: CPT | Performed by: INTERNAL MEDICINE

## 2019-09-26 RX ORDER — ERGOCALCIFEROL 1.25 MG/1
50000 CAPSULE ORAL
Qty: 4 CAPSULE | Refills: 4 | Status: SHIPPED | OUTPATIENT
Start: 2019-09-26 | End: 2020-03-17 | Stop reason: SDUPTHER

## 2019-09-27 ENCOUNTER — TELEPHONE (OUTPATIENT)
Dept: FAMILY MEDICINE CLINIC | Facility: CLINIC | Age: 49
End: 2019-09-27

## 2019-09-27 NOTE — TELEPHONE ENCOUNTER
----- Message from Linda Coronado sent at 9/26/2019  2:53 PM EDT -----  Dr Zeferino christianson/[facility] called regarding this patient his best call back is [phone]   9/17/2021              Kadeem Larkin        [facility] [id number]         To Whom It May Concern,  Bobbi Adhikari was seen today with allergies,please allow him to return to school today    Sincerely,    Gonsalo Bradley DO  [facility]

## 2019-09-27 NOTE — TELEPHONE ENCOUNTER
I spoke with Dr. Bradford.  He stopped statin due to concern for rhabdomyolysis and will have the patient recheck his CK in 2 weeks.  I will await further follow-up on labs.    Unfortunately, his CK has not been checked in the past and he is a new patient to me.  This is his first CK testing that I can see.  He is asymptomatic and has no myalgias.  He also has normal renal function.  We will await further testing to determine recommendations and further work-up versus treatment.

## 2019-09-29 NOTE — PROGRESS NOTES
Date of Office Visit: 2019  Encounter Provider: Jeremiah Bradford MD  Place of Service: [facility] CARDIOLOGY  Patient Name: Gaurav Mora  :1970    Chief complaint: Follow-up for hypertension, prior hemorrhagic CVA.  Elevated   CPK.    History of Present Illness:    I had the pleasure of seeing the patient in cardiology office on 2019.  He is a   very pleasant 49 year-old white male with a history of hypertension, obstructive   sleep apnea, and hemorrhagic CVA who presents for follow-up. The patient's   father is with him today and assists significantly in his care.  I initially saw him in   the office on 2018.     The patient was [facility] [place] [place] He experienced   right sided neurological symptoms and visual disturbances on 2017. He was   brought to the hospital at that time where he was found to have an extensive   hemorrhagic CVA.  His blood pressure upon presentation was 242/130.  He was   unaware that he had hypertension prior to this, and it was felt that the hypertension   caused the brain hemorrhage.  At that time, he was been placed on amlodipine,   labetalol, lisinopril, and spironolactone with good results.  He still does have   complete loss of sensation on the right side (although he still has good strength in   the right side), as well as loss of peripheral vision.  He did have an echocardiogram   in [place] the time of his stroke on 2017 which showed an ejection fraction of   60%, and a very mildly dilated proximal aorta at 3.6 cm.    The patient presents today for follow-up.  He did have a CT scan of his chest on   2019 which showed mild enlargement of the ascending aorta at 3.8 cm.  He   had labs drawn on 2019 which showed a significantly elevated CK level of   1044.  His renal function was intact, and his GFR was actually 78.  However, he   did have some microscopic hematuria as well.   He is taking Crestor.  He denies   any muscle pain, myalgias, or muscle weakness.  His blood pressure has been   excellent.  He has had no other symptoms such as chest discomfort or shortness   of breath.    Past Medical History:   Diagnosis Date   • Headache, tension-type    • Hemorrhagic stroke (CMS/HCC) 11/24/2017    Residual peripheral vision loss, right-sided sensation loss   • Hypertension    • Lung nodule    • SHANE on CPAP    • Stroke (CMS/HCC)        Past Surgical History:   Procedure Laterality Date   • APPENDECTOMY         Current Outpatient Medications on File Prior to Visit   Medication Sig Dispense Refill   • acetaminophen (TYLENOL) 325 MG tablet Take 650 mg by mouth Every 4 (Four) Hours As Needed for Mild Pain .     • amLODIPine (NORVASC) 10 MG tablet Take 1 tablet by mouth Daily. 90 tablet 3   • aspirin 81 MG tablet Take 81 mg by mouth Daily.     • fluticasone (FLONASE) 50 MCG/ACT nasal spray 1 spray into the nostril(s) as directed by provider Daily. 3 bottle 1   • labetalol (NORMODYNE) 100 MG tablet Take 1 tablet by mouth 3 (Three) Times a Day. 270 tablet 3   • lisinopril (PRINIVIL,ZESTRIL) 20 MG tablet Take 1 tablet by mouth Daily. 90 tablet 3   • loratadine (CLARITIN) 10 MG tablet Take 1 tablet by mouth Daily. 90 tablet 1   • rosuvastatin (CRESTOR) 10 MG tablet Take 1 tablet by mouth Every Night. 90 tablet 3   • spironolactone (ALDACTONE) 25 MG tablet Take 1 tablet by mouth Daily. 90 tablet 3     No current facility-administered medications on file prior to visit.      Allergies as of 09/26/2019   • (No Known Allergies)     Social History     Socioeconomic History   • Marital status: Single     Spouse name: Not on file   • Number of children: 0   • Years of education: Not on file   • Highest education level: Not on file   Occupational History   • Occupation: disability   Tobacco Use   • Smoking status: Never Smoker   • Smokeless tobacco: Never Used   • Tobacco comment: minimal caffeine   Substance  "and Sexual Activity   • Alcohol use: No   • Drug use: No   • Sexual activity: Defer     Family History   Problem Relation Age of Onset   • Breast cancer Mother    • Atrial fibrillation Father    • Hypertension Father    • Colon cancer Paternal Grandmother    • Heart disease Maternal Grandfather    • Heart disease Maternal Aunt    • Heart disease Maternal Uncle        Review of Systems   All other systems reviewed and are negative.     Objective:     Vitals:    09/26/19 1418   BP: 108/74   Pulse: 61   SpO2: 98%   Weight: 112 kg (246 lb 3.2 oz)   Height: 175.3 cm (69.02\")     Body mass index is 36.34 kg/m².    Physical Exam   Constitutional: He is oriented to person, place, and time. He appears well-developed and well-nourished.   HENT:   Head: Normocephalic and atraumatic.   Eyes: Conjunctivae are normal.   Neck: Neck supple.   Cardiovascular: Normal rate and regular rhythm. Exam reveals no gallop and no friction rub.   No murmur heard.  Pulmonary/Chest: Effort normal and breath sounds normal.   Abdominal: Soft. There is no tenderness.   Musculoskeletal: He exhibits no edema.   Neurological: He is alert and oriented to person, place, and time.   Skin: Skin is warm.   Psychiatric: He has a normal mood and affect. His behavior is normal.     Lab Review:   Procedures    Cardiac Procedures:  1.  Echocardiogram on 11/27/2017 in [place], [place]: The ejection fraction was 60%. The   aortic root measured 3.8 cm.  The proximal aorta was 3.6 cm.  2.  Noncontrast CT scan of the chest on 9/9/2019: There was a stable pulmonary nodule   left upper lobe.  There was hepatic steatosis.  There was mild enlargement of the   ascending aorta at 3.8 cm.    Assessment:       Diagnosis Plan   1. Non-traumatic rhabdomyolysis  CK    Basic Metabolic Panel   2. Essential hypertension     3. Hemorrhagic cerebrovascular accident (CVA) (CMS/HCC)       Plan:       I had a long discussion with the patient and his father today.  I am concerned that " he may have a mild level of statin induced rhabdomyolysis.  I suspect that this has been ongoing for quite some time, although it may just now being discovered.  He is not having any muscle weakness or pain.  I have recommended that he stop the Crestor and stay very well-hydrated with water.  His GFR was normal at 78.  I will recheck his CK level and a BMP in 2 weeks.  He should remain on the aspirin for his history of stroke.  He will continue on the amlodipine, labetalol, lisinopril, and Spironolactone.  His blood pressure is actually excellent.  He is faithful about wearing his CPAP mask at night.  His CT scan of the chest on 9/9/2019 showed a mildly dilated aorta at 3.8 cm.  This will need to be rechecked in the future, although he is going to get a follow-up CT anyway for the pulmonary nodule to document stability.  Again, his father assists greatly in his care as he does have residual neurological sequelae from the hemorrhagic stroke.  I am going to have him follow-up with me in 6 months.  I will make further plans after his repeat CK level is known in 2 weeks.

## 2019-10-10 ENCOUNTER — LAB (OUTPATIENT)
Dept: LAB | Facility: HOSPITAL | Age: 49
End: 2019-10-10

## 2019-10-10 DIAGNOSIS — M62.82 NON-TRAUMATIC RHABDOMYOLYSIS: ICD-10-CM

## 2019-10-10 LAB
ANION GAP SERPL CALCULATED.3IONS-SCNC: 13.8 MMOL/L (ref 5–15)
BUN BLD-MCNC: 12 MG/DL (ref 6–20)
BUN/CREAT SERPL: 11.8 (ref 7–25)
CALCIUM SPEC-SCNC: 9.1 MG/DL (ref 8.6–10.5)
CHLORIDE SERPL-SCNC: 100 MMOL/L (ref 98–107)
CK SERPL-CCNC: 980 U/L (ref 20–200)
CO2 SERPL-SCNC: 26.2 MMOL/L (ref 22–29)
CREAT BLD-MCNC: 1.02 MG/DL (ref 0.76–1.27)
GFR SERPL CREATININE-BSD FRML MDRD: 78 ML/MIN/1.73
GLUCOSE BLD-MCNC: 98 MG/DL (ref 65–99)
POTASSIUM BLD-SCNC: 4.2 MMOL/L (ref 3.5–5.2)
SODIUM BLD-SCNC: 140 MMOL/L (ref 136–145)

## 2019-10-10 PROCEDURE — 36415 COLL VENOUS BLD VENIPUNCTURE: CPT

## 2019-10-10 PROCEDURE — 82550 ASSAY OF CK (CPK): CPT

## 2019-10-10 PROCEDURE — 80048 BASIC METABOLIC PNL TOTAL CA: CPT

## 2019-10-11 DIAGNOSIS — M62.82 NON-TRAUMATIC RHABDOMYOLYSIS: Primary | ICD-10-CM

## 2019-10-11 NOTE — PROGRESS NOTES
I called and spoke with her dad.  CK levels have gone down.  Not sure if this related to the Crestor or not, but I told him he should stay off of statins for now. I am going to recheck in one month.

## 2019-10-22 ENCOUNTER — OFFICE VISIT (OUTPATIENT)
Dept: NEUROLOGY | Facility: CLINIC | Age: 49
End: 2019-10-22

## 2019-10-22 VITALS
HEIGHT: 69 IN | HEART RATE: 65 BPM | SYSTOLIC BLOOD PRESSURE: 110 MMHG | OXYGEN SATURATION: 98 % | DIASTOLIC BLOOD PRESSURE: 78 MMHG | BODY MASS INDEX: 36.29 KG/M2 | WEIGHT: 245 LBS

## 2019-10-22 DIAGNOSIS — I69.359 HISTORY OF HEMORRHAGIC STROKE WITH RESIDUAL HEMIPARESIS (HCC): Primary | ICD-10-CM

## 2019-10-22 PROCEDURE — 99215 OFFICE O/P EST HI 40 MIN: CPT | Performed by: PSYCHIATRY & NEUROLOGY

## 2019-10-22 NOTE — PROGRESS NOTES
CC: Hypertensive hemorrhage sequelae    HPI:  Gaurav Mora is a  49 y.o. right-handed white male who I am seeing in follow-up for a left putaminal hemorrhage with resulting right hemiparesis, hemihypnesthesia and visual field cut.  Patient has had no new neurologic symptoms since I saw him last 1 year ago.  He has persistence of mild right-sided weakness and gait disturbance, right visual field cut, right-sided numbness and mild cognitive symptoms.  His blood pressure is been well controlled generally about 110/70s.  He was tried on atorvastatin by Dr. Bradford for his hyperlipidemia but it caused an elevation of CPK and it was stopped.  The patient has failed on 2 occasions to obtain Social Security disability and they are pursuing another appeal and need further paperwork filled out.        Past Medical History:   Diagnosis Date   • Headache, tension-type    • Hemorrhagic stroke (CMS/HCC) 11/24/2017    Residual peripheral vision loss, right-sided sensation loss   • Hypertension    • Lung nodule    • SHANE on CPAP    • Stroke (CMS/HCC)          Past Surgical History:   Procedure Laterality Date   • APPENDECTOMY             Current Outpatient Medications:   •  acetaminophen (TYLENOL) 325 MG tablet, Take 650 mg by mouth Every 4 (Four) Hours As Needed for Mild Pain ., Disp: , Rfl:   •  amLODIPine (NORVASC) 10 MG tablet, Take 1 tablet by mouth Daily., Disp: 90 tablet, Rfl: 3  •  aspirin 81 MG tablet, Take 81 mg by mouth Daily., Disp: , Rfl:   •  fluticasone (FLONASE) 50 MCG/ACT nasal spray, 1 spray into the nostril(s) as directed by provider Daily., Disp: 3 bottle, Rfl: 1  •  labetalol (NORMODYNE) 100 MG tablet, Take 1 tablet by mouth 3 (Three) Times a Day., Disp: 270 tablet, Rfl: 3  •  lisinopril (PRINIVIL,ZESTRIL) 20 MG tablet, Take 1 tablet by mouth Daily., Disp: 90 tablet, Rfl: 3  •  loratadine (CLARITIN) 10 MG tablet, Take 1 tablet by mouth Daily., Disp: 90 tablet, Rfl: 1  •  spironolactone (ALDACTONE) 25 MG  tablet, Take 1 tablet by mouth Daily., Disp: 90 tablet, Rfl: 3  •  vitamin D (ERGOCALCIFEROL) 14878 units capsule capsule, Take 1 capsule by mouth Every 7 (Seven) Days., Disp: 4 capsule, Rfl: 4  •  rosuvastatin (CRESTOR) 10 MG tablet, Take 1 tablet by mouth Every Night., Disp: 90 tablet, Rfl: 3      Family History   Problem Relation Age of Onset   • Breast cancer Mother    • Atrial fibrillation Father    • Hypertension Father    • Colon cancer Paternal Grandmother    • Heart disease Maternal Grandfather    • Heart disease Maternal Aunt    • Heart disease Maternal Uncle          Social History     Socioeconomic History   • Marital status: Single     Spouse name: Not on file   • Number of children: 0   • Years of education: Not on file   • Highest education level: Not on file   Occupational History   • Occupation: disability   Tobacco Use   • Smoking status: Never Smoker   • Smokeless tobacco: Never Used   • Tobacco comment: minimal caffeine   Substance and Sexual Activity   • Alcohol use: No   • Drug use: No   • Sexual activity: Defer         No Known Allergies      Pain Scale: 0/10        ROS:  Review of Systems   Constitutional: Negative for activity change, appetite change and fatigue.   Eyes: Negative for pain, redness and itching.   Respiratory: Negative for cough, choking and shortness of breath.    Cardiovascular: Negative for chest pain and leg swelling.   Gastrointestinal: Negative for abdominal pain, nausea and vomiting.   Neurological: Positive for numbness. Negative for dizziness, tremors, seizures, syncope, facial asymmetry, speech difficulty, weakness, light-headedness and headaches.   Hematological: Does not bruise/bleed easily.   Psychiatric/Behavioral: Negative for agitation, behavioral problems, confusion, decreased concentration, dysphoric mood, hallucinations, self-injury, sleep disturbance and suicidal ideas. The patient is not nervous/anxious and is not hyperactive.      The patient also  "continues to have mild right-sided weakness and right visual field cut along with occasional cognitive symptoms  Remaining systems reviewed and were negative  I have reviewed and agree with the above ROS completed by the medical assistant.      Physical Exam:  Vitals:    10/22/19 1603   BP: 110/78   Pulse: 65   SpO2: 98%   Weight: 111 kg (245 lb)   Height: 175.3 cm (69.02\")     Orthostatic BP:    Body mass index is 36.16 kg/m².    Physical Exam  General: Overweight white male no acute distress  HEENT: Normocephalic no evidence of trauma  Neck: Supple.  No thyromegaly.  No cervical bruits.  Heart: Regular rate and rhythm no murmur  Extremities: No pedal edema radial pulses were strong and simultaneous      Neurological Exam:   Mental Status: Awake, alert, oriented 9/10 on Mini-Mental state.  Conversant without evidence of an affective disorder, thought disorder, delusions or hallucinations.  Attention span and concentration are normal.  HCF: Minor dysphasia on expressing nonsensical phrases.  Minor dyspraxia on performance of tasks but did fine on clock draw 4/4 and intersecting pentagons.  No dysarthria.  Recent and remote memory intact.  Knowledge  of recent events intact.  Mini-Mental state score was 28/30 and animal naming was 14 animals in 1 minute  CN: I:   II: Right homonymous hemianopia   III, IV, VI: Eye movements intact without nystagmus or ptosis.  Pupils equal  round and reactive to light.   V,VII: Light touch and pinprick intact all 3 divisions of V.  Facial muscles with minimal asymmetry depressed in the right nasolabial fold   VIII: Hearing intact to finger rub   IX,X: Soft palate elevates symmetrically   XI: Sternomastoid and trapezius are strong.   XII: Tongue midline without atrophy or fasciculations  Motor: Minor increase in tone on the right.  Normal bulk bulk in the upper and lower extremities   Power testing: There is mild weakness of the interossei, finger and thumb extension and abductor " pollicis brevis of the right hand.  All other muscles tested in the arms and legs were normal.  Reflexes: Upper extremities: Reflex preponderance brisker on the right        Lower extremities: Reflex preponderance brisker on the right        Toe signs:  Sensory: Light touch: Reduced in the right arm        Pinprick: Reduced in the right arm        Vibration: Intact at the ankle        Position: Intact at the great toe    Cerebellar: Finger-to-nose: Intact but slightly slowed in the right hand           Rapid movement: Pretty normal speed           Heel-to-shin: Intact  Gait and Station: Mildly broad-based.  Locks right knee for ambulation.  Right arm swing is reduced.  Mild pronator drift of the right hand.  No Romberg    Results:      Lab Results   Component Value Date    GLUCOSE 98 10/10/2019    BUN 12 10/10/2019    CREATININE 1.02 10/10/2019    EGFRIFNONA 78 10/10/2019    EGFRIFAFRI 95 09/17/2019    BCR 11.8 10/10/2019    CO2 26.2 10/10/2019    CALCIUM 9.1 10/10/2019    PROTENTOTREF 7.6 09/17/2019    ALBUMIN 5.10 09/17/2019    LABIL2 2.0 09/17/2019    AST 30 09/17/2019    ALT 43 (H) 09/17/2019       Lab Results   Component Value Date    WBC 7.09 09/17/2019    HGB 15.2 09/17/2019    HCT 47.9 09/17/2019    MCV 85.7 09/17/2019     09/17/2019         .No results found for: RPR      Lab Results   Component Value Date    TSH 1.720 01/08/2019         No results found for: [id number]      No results found for: FOLATE      No results found for: HGBA1C      Lab Results   Component Value Date    GLUCOSE 98 10/10/2019    BUN 12 10/10/2019    CREATININE 1.02 10/10/2019    EGFRIFNONA 78 10/10/2019    EGFRIFAFRI 95 09/17/2019    BCR 11.8 10/10/2019    K 4.2 10/10/2019    CO2 26.2 10/10/2019    CALCIUM 9.1 10/10/2019    PROTENTOTREF 7.6 09/17/2019    ALBUMIN 5.10 09/17/2019    LABIL2 2.0 09/17/2019    AST 30 09/17/2019    ALT 43 (H) 09/17/2019         Lab Results   Component Value Date    WBC 7.09 09/17/2019    HGB 15.2  09/17/2019    HCT 47.9 09/17/2019    MCV 85.7 09/17/2019     09/17/2019             Assessment:   1.  Sequelae of left putaminal hemorrhage, stable without new symptoms.  2.  Hypertension-well-controlled  3.  Hyperlipidemia not currently on any medication due to side effect of statins        Plan:  1.  Continue aspirin 81 mg daily and his hypertensive therapy from his primary physician/cardiologist  2.  He may consider Repatha as an alternative to statins if he has side effect problems from statins.  Zetia may also be an option but has limited effect  3.  We will fill out his Social Security disability statement  4.  Follow-up in 1 year          >50% of this 40-minute follow-up was spent counseling the patient on risk factor control and aspirin.          Dictated utilizing Dragon dictation.

## 2019-11-04 ENCOUNTER — CLINICAL SUPPORT (OUTPATIENT)
Dept: FAMILY MEDICINE CLINIC | Facility: CLINIC | Age: 49
End: 2019-11-04

## 2019-11-04 PROCEDURE — 90674 CCIIV4 VAC NO PRSV 0.5 ML IM: CPT | Performed by: PHYSICIAN ASSISTANT

## 2019-11-04 PROCEDURE — 90471 IMMUNIZATION ADMIN: CPT | Performed by: PHYSICIAN ASSISTANT

## 2019-11-22 ENCOUNTER — TELEPHONE (OUTPATIENT)
Dept: CARDIOLOGY | Facility: CLINIC | Age: 49
End: 2019-11-22

## 2019-11-22 ENCOUNTER — LAB (OUTPATIENT)
Dept: LAB | Facility: HOSPITAL | Age: 49
End: 2019-11-22

## 2019-11-22 ENCOUNTER — OFFICE VISIT (OUTPATIENT)
Dept: SLEEP MEDICINE | Facility: HOSPITAL | Age: 49
End: 2019-11-22

## 2019-11-22 VITALS
BODY MASS INDEX: 37.03 KG/M2 | WEIGHT: 250 LBS | SYSTOLIC BLOOD PRESSURE: 125 MMHG | OXYGEN SATURATION: 97 % | HEART RATE: 74 BPM | HEIGHT: 69 IN | DIASTOLIC BLOOD PRESSURE: 82 MMHG

## 2019-11-22 DIAGNOSIS — G47.33 OBSTRUCTIVE SLEEP APNEA: Primary | ICD-10-CM

## 2019-11-22 DIAGNOSIS — E66.9 OBESITY (BMI 30-39.9): ICD-10-CM

## 2019-11-22 DIAGNOSIS — R91.1 LUNG NODULE: ICD-10-CM

## 2019-11-22 DIAGNOSIS — M62.82 NON-TRAUMATIC RHABDOMYOLYSIS: ICD-10-CM

## 2019-11-22 LAB — CK SERPL-CCNC: 756 U/L (ref 20–200)

## 2019-11-22 PROCEDURE — 82550 ASSAY OF CK (CPK): CPT

## 2019-11-22 PROCEDURE — G0463 HOSPITAL OUTPT CLINIC VISIT: HCPCS

## 2019-11-22 PROCEDURE — 36415 COLL VENOUS BLD VENIPUNCTURE: CPT

## 2019-11-22 NOTE — TELEPHONE ENCOUNTER
PTs father called in for sons results I relayed reults interprettd by GM and that he would stay off of the statins for the time being. Pts father voiced understanding.    Thanks SW

## 2019-11-22 NOTE — TELEPHONE ENCOUNTER
----- Message from Jeremiah Bradford MD sent at 11/22/2019  3:53 PM EST -----  Can you please let his dad know that the CK level is coming down and is not at 756?  The patient has had a stroke, and his father takes care of his health care.  I think we should leave him off of statins for at least a few more months.  Thanks     GM  ----- Message -----  From: Lab, Background User  Sent: 11/22/2019   3:44 PM  To: Jeremiah Bradford MD

## 2019-11-22 NOTE — TELEPHONE ENCOUNTER
Contacted the pt.  Relayed MD's message & further recommendations.Pt verbalized understanding.  Advised if the diarrhea persists  to contact the clinic.    vm left for patients father to call back  Kamla Arais RN  Triage nurse

## 2019-11-22 NOTE — PROGRESS NOTES
[facility] SLEEP MEDICINE  [address] [facility]  3rd Floor  [facility] [address]  [phone]    PCP: Rebecca Nixon PA    Reason for visit:  Sleep disorders: SHANE    Gaurav is a 49 y.o.male who was seen in the Sleep Disorders Center today. He remains compliant with his CPAP. He sleeps from 11pm to 8am. He uses ffm, fots well, no air leaks, no dry mouth.  [place] Sleepiness Scale is 5. Caffeine 1 per day. Alcohol 0 per week.    Gaurav  reports that he has never smoked. He has never used smokeless tobacco.    Pertinent Positive Review of Systems of denies  Rest of Review of Systems was negative as recorded in Sleep Questionnaire.    Patient  has a past medical history of Headache, tension-type, Hemorrhagic stroke (CMS/HCC) (11/24/2017), Hypertension, Lung nodule, SHANE on CPAP, and Stroke (CMS/HCC).     Current Medications:    Current Outpatient Medications:   •  acetaminophen (TYLENOL) 325 MG tablet, Take 650 mg by mouth Every 4 (Four) Hours As Needed for Mild Pain ., Disp: , Rfl:   •  amLODIPine (NORVASC) 10 MG tablet, Take 1 tablet by mouth Daily., Disp: 90 tablet, Rfl: 3  •  aspirin 81 MG tablet, Take 81 mg by mouth Daily., Disp: , Rfl:   •  fluticasone (FLONASE) 50 MCG/ACT nasal spray, 1 spray into the nostril(s) as directed by provider Daily., Disp: 3 bottle, Rfl: 1  •  labetalol (NORMODYNE) 100 MG tablet, Take 1 tablet by mouth 3 (Three) Times a Day., Disp: 270 tablet, Rfl: 3  •  lisinopril (PRINIVIL,ZESTRIL) 20 MG tablet, Take 1 tablet by mouth Daily., Disp: 90 tablet, Rfl: 3  •  loratadine (CLARITIN) 10 MG tablet, Take 1 tablet by mouth Daily., Disp: 90 tablet, Rfl: 1  •  rosuvastatin (CRESTOR) 10 MG tablet, Take 1 tablet by mouth Every Night., Disp: 90 tablet, Rfl: 3  •  spironolactone (ALDACTONE) 25 MG tablet, Take 1 tablet by mouth Daily., Disp: 90 tablet, Rfl: 3  •  vitamin D (ERGOCALCIFEROL) 23189 units capsule capsule, Take 1 capsule by mouth Every 7 (Seven) Days., Disp: 4 capsule, Rfl: 4   also  "entered in Sleep Questionnaire         Vital Signs: /82   Pulse 74   Ht 175.3 cm (69\")   Wt 113 kg (250 lb)   SpO2 97%   BMI 36.92 kg/m²     Body mass index is 36.92 kg/m².       Tongue: large      Dentition: good       Pharynx: Posterior pharyngeal pillars are unable to see   Mallampatti: IV (only hard palate visible)        General: Alert. Cooperative. Well developed. No acute distress.             Head:  Normocephalic. Symmetrical. Atraumatic.              Nose: No septal deviation. No drainage.          Throat: No oral lesions. No thrush. Moist mucous membranes.    Chest Wall:  Normal shape. Symmetric expansion with respiration. No tenderness.             Neck:  Trachea midline.           Lungs:  Clear to auscultation bilaterally. No wheezes. No rhonchi. No rales. Respirations regular, even and unlabored.            Heart:  Regular rhythm and normal rate. Normal S1 and S2. No murmur.     Abdomen:  Soft, non-tender and non-distended. Normal bowel sounds. No masses.  Extremities:  Moves all extremities well. No edema.    Psychiatric: Normal mood and affect.    Study:  · Outside sleep study at [facility] sleep center in [facility], 3/19/2018  AHI index 14.3 titrated up to 11 cm water pressure    Testing:  · 100% compliance with average usage 8 hours 53 minutes, AHI 0.6 with average pressure 9.7, auto CPAP between 8 and 12 cm.    DME Company: [employer]    Impression:  1. Obstructive sleep apnea    2. Obesity (BMI 30-39.9)    3. Lung nodule        Plan:  Gaurav is compliant and benefits from use of his CPAP machine.  He will continue with current usage and change supplies regularly.    Lung nodule - following with Thoracic surgery, further CT in 6 months.    I reiterated the importance of effective treatment of obstructive sleep apnea with PAP machine.  Cardiovascular health risks of untreated sleep apnea were again reviewed.  Patient was asked to remain cautious if there is persistent hypersomnolence. " The benefit of weight loss in reducing severity of obstructive sleep apnea was discussed.  Patient would benefit from adhering to a strict diet to achieve ideal BMI.     Change of PAP supplies regularly is important for effective use.  Change of cushion on the mask or plugs on nasal pillows along with disposable filters once every month and change of mask frame, tubing, headgear and Velcro straps every 6 months at the minimum was reiterated.    This patient is compliant with PAP machine and benefits from its use.  Apnea hypopneas index is corrected/improved.  Daytime hypersomnolence has resolved.     Patient will follow up in this clinic in 1 year, APRN    Thank you for allowing me to participate in your patient's care.    Freedom Garcia MD    Part of this note may be an electronic transcription/translation of spoken language to printed text using the Dragon Dictation System.

## 2019-12-03 RX ORDER — LORATADINE 10 MG/1
10 TABLET ORAL DAILY
Qty: 90 TABLET | Refills: 1 | Status: SHIPPED | OUTPATIENT
Start: 2019-12-03 | End: 2020-08-12

## 2019-12-17 ENCOUNTER — TELEPHONE (OUTPATIENT)
Dept: CARDIOLOGY | Facility: CLINIC | Age: 49
End: 2019-12-17

## 2019-12-17 NOTE — TELEPHONE ENCOUNTER
12/17/19  [facility] left at 1:41  - asked for call back - has a question regarding son's meds.  Ph [phone].  I called back, got vmail - I left [facility] to call back/jessie    Spoke with pt's father.  He is filling out SS disability papers and has to list meds and the prescribing doctor.  Since his previous pcp, vane Dodd, retired, he is asking if Dr. Bradford is ok with being th doctor who refills these meds.  I explained to him that there are certain meds that cardiology will fill and pcp will fill others, but that I would ask Dr. Bradford which ones he will fill in the future. The meds that Brandie Montes'd are amlodipine, labetalol, lisinopril, rosuvastatin and spironlactone.  Please advise./jessie

## 2019-12-17 NOTE — TELEPHONE ENCOUNTER
Betsy,    All of these meds are fine with me to prescribe.  Can you please let his dad know?  Thanks    TERRIE

## 2019-12-18 NOTE — TELEPHONE ENCOUNTER
12/18/19  I called, left [facility] [place] [place] with this information for patient's father that Dr. Bradford will fill the listed meds in the future.  I asked him to call back if he needs these refilled and where, as well as if it should be for 30 or 90 day supply./jessie

## 2020-01-31 DIAGNOSIS — I10 ESSENTIAL HYPERTENSION: ICD-10-CM

## 2020-01-31 RX ORDER — LABETALOL 100 MG/1
100 TABLET, FILM COATED ORAL 3 TIMES DAILY
Qty: 270 TABLET | Refills: 0 | Status: SHIPPED | OUTPATIENT
Start: 2020-01-31 | End: 2020-05-19

## 2020-01-31 RX ORDER — AMLODIPINE BESYLATE 10 MG/1
10 TABLET ORAL DAILY
Qty: 90 TABLET | Refills: 0 | Status: SHIPPED | OUTPATIENT
Start: 2020-01-31 | End: 2020-03-16 | Stop reason: SDUPTHER

## 2020-01-31 RX ORDER — LABETALOL 100 MG/1
100 TABLET, FILM COATED ORAL 3 TIMES DAILY
Qty: 270 TABLET | Refills: 0 | Status: SHIPPED | OUTPATIENT
Start: 2020-01-31 | End: 2020-01-31 | Stop reason: SDUPTHER

## 2020-01-31 RX ORDER — AMLODIPINE BESYLATE 10 MG/1
10 TABLET ORAL DAILY
Qty: 90 TABLET | Refills: 0 | Status: SHIPPED | OUTPATIENT
Start: 2020-01-31 | End: 2020-01-31 | Stop reason: SDUPTHER

## 2020-02-19 ENCOUNTER — TELEPHONE (OUTPATIENT)
Dept: CARDIOLOGY | Facility: CLINIC | Age: 50
End: 2020-02-19

## 2020-02-19 NOTE — TELEPHONE ENCOUNTER
02/19/2020 overhead call   [facility] call stating pt said he was here for labs.   I looked in chart did not see any orders or see where any labs were needed.   Please advise if nee any further labs,   Pt's call back # [phone]   Thanks Harpreet SINGH

## 2020-03-09 ENCOUNTER — HOSPITAL ENCOUNTER (OUTPATIENT)
Dept: CT IMAGING | Facility: HOSPITAL | Age: 50
Discharge: HOME | End: 2020-03-09
Admitting: THORACIC SURGERY (CARDIOTHORACIC VASCULAR SURGERY)

## 2020-03-09 DIAGNOSIS — R91.1 LUNG NODULE: ICD-10-CM

## 2020-03-09 PROCEDURE — 71250 CT THORAX DX C-: CPT

## 2020-03-11 ENCOUNTER — OFFICE VISIT (OUTPATIENT)
Dept: FAMILY MEDICINE CLINIC | Facility: CLINIC | Age: 50
End: 2020-03-11

## 2020-03-11 VITALS
SYSTOLIC BLOOD PRESSURE: 124 MMHG | BODY MASS INDEX: 36.73 KG/M2 | TEMPERATURE: 98.6 F | OXYGEN SATURATION: 98 % | HEIGHT: 69 IN | HEART RATE: 64 BPM | RESPIRATION RATE: 16 BRPM | WEIGHT: 248 LBS | DIASTOLIC BLOOD PRESSURE: 80 MMHG

## 2020-03-11 DIAGNOSIS — R20.9 ALTERATION OF SENSATIONS, POST-STROKE: ICD-10-CM

## 2020-03-11 DIAGNOSIS — R79.89 ELEVATED LIVER FUNCTION TESTS: ICD-10-CM

## 2020-03-11 DIAGNOSIS — R74.8 ELEVATED CK: ICD-10-CM

## 2020-03-11 DIAGNOSIS — G47.33 OBSTRUCTIVE SLEEP APNEA: ICD-10-CM

## 2020-03-11 DIAGNOSIS — E78.5 HYPERLIPIDEMIA LDL GOAL <70: ICD-10-CM

## 2020-03-11 DIAGNOSIS — I10 ESSENTIAL HYPERTENSION: Primary | ICD-10-CM

## 2020-03-11 DIAGNOSIS — I61.9 HEMORRHAGIC STROKE (HCC): ICD-10-CM

## 2020-03-11 DIAGNOSIS — R47.01 EXPRESSIVE APHASIA: ICD-10-CM

## 2020-03-11 DIAGNOSIS — E55.9 VITAMIN D DEFICIENCY: ICD-10-CM

## 2020-03-11 DIAGNOSIS — I69.398 ALTERATION OF SENSATIONS, POST-STROKE: ICD-10-CM

## 2020-03-11 PROCEDURE — 99214 OFFICE O/P EST MOD 30 MIN: CPT | Performed by: PHYSICIAN ASSISTANT

## 2020-03-11 NOTE — PROGRESS NOTES
Subjective   Gaurav Mora is a 49 y.o. male  who presents today in follow up of hypertension, hyperlipidemia, elevated CK and liver function test, vitamin D deficiency, history of CVA with residual deficit, and specialists.     History of Present Illness     Hypertension- patient has been stable on Norvasc 10 mg once daily, labetalol 100 mg once daily, lisinopril 20 mg once daily, and spironolactone 25 mg once daily.  Hyperlipidemia-had to stop statin due to significantly elevated CK as well as elevated LFT.  Has not been rechecked since 11/2019.  Vitamin D- was taking vitamin D 50,000 IU once weekly. Only took for 1 month then stopped.     7/11/19 disability note. They will call back with new fax number.     Patient has been doing well without complaints.  He is tolerating medications without AE.  No concerns today    Patient's Specialists:  Cardiology- Dr. Bradford- last appt 9/26/2019- for nontraumatic rhabdomylolysis, hypertension, and history of hemorrhagic CVA. Advised to follow up in 6 months.   Neurology- Dr Garner-  Last appt 10/22/2019- for sequelae of left putaminal hemorrhage, stable without new symptoms, hypertension, and hyperlipidemia. He advised patient may consider Repatha if he cannot tolerate statin or Zetia but limited effect, continue ASA 81 mg once daily and antihypertensives. Follow up in 1 year.   Pulmonology- Dr Garcia- last appt 11/22/2019 for SHANE and pulmonary nodule. Stable and follow up with thoracic surgery as directed. Follow up with pulmonology in 1 year.   Thoracic surgery- Dr Palomares- last appt 9/16/2019- for pulmonary nodule- stable. Will need follow up every 6 months for 3 years. Last CT 3/9/2020- stable.   Urology- Dr. Marie- last appt 10/2019 for microhematuria- CT abd/pelvis and cystoscopy was negative. Follow up in 1 year.     The following portions of the patient's history were reviewed and updated as appropriate: allergies, current medications, past family history, past  medical history, past social history, past surgical history and problem list.    Review of Systems   HENT: Negative.    Respiratory: Negative.    Cardiovascular: Negative.    Gastrointestinal: Negative.    Genitourinary: Negative.    Musculoskeletal: Negative for arthralgias and myalgias.   Skin: Negative.    Neurological: Positive for speech difficulty and weakness.   Psychiatric/Behavioral: Negative.        Objective      Vitals:    03/11/20 1017   BP: 124/80   Pulse: 64   Resp: 16   Temp: 98.6 °F (37 °C)   SpO2: 98%     Body mass index is 36.61 kg/m².    Physical Exam   Constitutional: He is oriented to person, place, and time. He appears well-developed.   HENT:   Head: Normocephalic and atraumatic.   Right Ear: External ear normal.   Left Ear: External ear normal.   Eyes: Conjunctivae are normal.   Neck: Carotid bruit is not present. No tracheal deviation present. No thyroid mass and no thyromegaly present.   Cardiovascular: Normal rate, regular rhythm, normal heart sounds and intact distal pulses.   Pulmonary/Chest: Effort normal and breath sounds normal.   Neurological: He is alert and oriented to person, place, and time. Gait normal.   Skin: Skin is warm and dry.   Psychiatric: He has a normal mood and affect. His behavior is normal. Judgment and thought content normal.   Nursing note and vitals reviewed.      Assessment/Plan   Gaurav was seen today for paperwork for disability.    Diagnoses and all orders for this visit:    Essential hypertension  -     CBC & Differential  -     Comprehensive Metabolic Panel    Hyperlipidemia LDL goal <70  -     CBC & Differential  -     Comprehensive Metabolic Panel  -     CK  -     Lipid Panel With LDL / HDL Ratio    Elevated CK  -     CBC & Differential  -     CK    Elevated liver function tests  -     CBC & Differential  -     Comprehensive Metabolic Panel    Vitamin D deficiency  -     CBC & Differential  -     Comprehensive Metabolic Panel  -     Vitamin D 25  Hydroxy    Expressive aphasia    Hemorrhagic stroke (CMS/HCC)    Alteration of sensations, post-stroke    Obstructive sleep apnea      Patient Instructions   Assessment and Plan  49 year old male who presents today in follow up of hypertension, hyperlipidemia, elevated CK and liver function test, vitamin D deficiency, history of CVA with residual deficit, and specialists. He had a significant hemorrhagic CVA due to uncontrolled BP with residual deficits in 11/2017. His baseline prior to CVA is unknown to me, as I did not know him prior to CVA. He is doing well without CP, SOA, Palp, Neuro symptoms, or other complaints.  He is tolerating medications well.  Blood pressure today is stable. Continue Lisinopril 20 mg once daily, amlodipine 10 mg once daily, and Labetalol 100 mg 3 x daily. Patient had to stop his statin due to significantly elevated CK as well as elevated LFT.  I asked that he stop and recheck. Cardiology saw patient in the meantime and rechecked CK twice. They advised he stay off statin for a few more months. However, CK has not been rechecked since 11/2019. He never experienced any muscle pain or weakness or felt any changes with stopping medication. I am actually concerned about other etiology of elevated CK. I will recheck and if elevated, he will need to have autoimmune testing and consider rheumatology consult as well as follow up with his neurologist. Patient was taking vitamin D 50,000 IU once weekly, however, he only took the prescription for 1 month then stopped, not realizing he had refills and should continue medication. Disability paperwork signed at last visit for managing benefits. Patient to have fasting labs today.  Stability of conditions, plan, follow-up, and further recommendations pending labs.  If stable, follow-up in 4 to 6 months.    He will keep follow up with specialists as directed by them. I have reviewed imaging, labs, and consult notes.   Patient's Specialists:  Cardiology-  Dr. Bradford- last appt 9/26/2019- for nontraumatic rhabdomylolysis, hypertension, and history of hemorrhagic CVA. Advised to follow up in 6 months.   Neurology- Dr Garner-  Last appt 10/22/2019- for sequelae of left putaminal hemorrhage, stable without new symptoms, hypertension, and hyperlipidemia. He advised patient may consider Repatha if he cannot tolerate statin or Zetia but limited effect, continue ASA 81 mg once daily and antihypertensives. Follow up in 1 year.   Pulmonology- Dr Garcia- last appt 11/22/2019 for SHANE and pulmonary nodule. Stable and follow up with thoracic surgery as directed. Follow up with pulmonology in 1 year.   Thoracic surgery- Dr Palomares- last appt 9/16/2019- for pulmonary nodule- stable. Will need follow up every 6 months for 3 years. Last CT 3/9/2020- stable.   Urology- Dr. Marie- last appt 10/2019 for microhematuria- CT abd/pelvis and cystoscopy was negative. Follow up in 1 year.

## 2020-03-12 LAB
25(OH)D3+25(OH)D2 SERPL-MCNC: 15 NG/ML (ref 30–100)
ALBUMIN SERPL-MCNC: 4.6 G/DL (ref 3.5–5.2)
ALBUMIN/GLOB SERPL: 1.8 G/DL
ALP SERPL-CCNC: 57 U/L (ref 39–117)
ALT SERPL-CCNC: 29 U/L (ref 1–41)
AST SERPL-CCNC: 23 U/L (ref 1–40)
BASOPHILS # BLD AUTO: 0.04 10*3/MM3 (ref 0–0.2)
BASOPHILS NFR BLD AUTO: 0.7 % (ref 0–1.5)
BILIRUB SERPL-MCNC: 0.4 MG/DL (ref 0.2–1.2)
BUN SERPL-MCNC: 13 MG/DL (ref 6–20)
BUN/CREAT SERPL: 13.1 (ref 7–25)
CALCIUM SERPL-MCNC: 9.1 MG/DL (ref 8.6–10.5)
CHLORIDE SERPL-SCNC: 100 MMOL/L (ref 98–107)
CHOLEST SERPL-MCNC: 246 MG/DL (ref 0–200)
CK SERPL-CCNC: 789 U/L (ref 20–200)
CO2 SERPL-SCNC: 26.9 MMOL/L (ref 22–29)
CREAT SERPL-MCNC: 0.99 MG/DL (ref 0.76–1.27)
EOSINOPHIL # BLD AUTO: 0.1 10*3/MM3 (ref 0–0.4)
EOSINOPHIL NFR BLD AUTO: 1.8 % (ref 0.3–6.2)
ERYTHROCYTE [DISTWIDTH] IN BLOOD BY AUTOMATED COUNT: 12.5 % (ref 12.3–15.4)
GLOBULIN SER CALC-MCNC: 2.5 GM/DL
GLUCOSE SERPL-MCNC: 95 MG/DL (ref 65–99)
HCT VFR BLD AUTO: 43.4 % (ref 37.5–51)
HDLC SERPL-MCNC: 41 MG/DL (ref 40–60)
HGB BLD-MCNC: 14.5 G/DL (ref 13–17.7)
IMM GRANULOCYTES # BLD AUTO: 0.01 10*3/MM3 (ref 0–0.05)
IMM GRANULOCYTES NFR BLD AUTO: 0.2 % (ref 0–0.5)
LDLC SERPL CALC-MCNC: 192 MG/DL (ref 0–100)
LDLC/HDLC SERPL: 4.69 {RATIO}
LYMPHOCYTES # BLD AUTO: 1.49 10*3/MM3 (ref 0.7–3.1)
LYMPHOCYTES NFR BLD AUTO: 27 % (ref 19.6–45.3)
MCH RBC QN AUTO: 27.4 PG (ref 26.6–33)
MCHC RBC AUTO-ENTMCNC: 33.4 G/DL (ref 31.5–35.7)
MCV RBC AUTO: 81.9 FL (ref 79–97)
MONOCYTES # BLD AUTO: 0.41 10*3/MM3 (ref 0.1–0.9)
MONOCYTES NFR BLD AUTO: 7.4 % (ref 5–12)
NEUTROPHILS # BLD AUTO: 3.46 10*3/MM3 (ref 1.7–7)
NEUTROPHILS NFR BLD AUTO: 62.9 % (ref 42.7–76)
NRBC BLD AUTO-RTO: 0 /100 WBC (ref 0–0.2)
PLATELET # BLD AUTO: 245 10*3/MM3 (ref 140–450)
POTASSIUM SERPL-SCNC: 4.5 MMOL/L (ref 3.5–5.2)
PROT SERPL-MCNC: 7.1 G/DL (ref 6–8.5)
RBC # BLD AUTO: 5.3 10*6/MM3 (ref 4.14–5.8)
SODIUM SERPL-SCNC: 139 MMOL/L (ref 136–145)
TRIGL SERPL-MCNC: 64 MG/DL (ref 0–150)
VLDLC SERPL CALC-MCNC: 12.8 MG/DL
WBC # BLD AUTO: 5.51 10*3/MM3 (ref 3.4–10.8)

## 2020-03-12 NOTE — PATIENT INSTRUCTIONS
Assessment and Plan  49 year old male who presents today in follow up of hypertension, hyperlipidemia, elevated CK and liver function test, vitamin D deficiency, history of CVA with residual deficit, and specialists. He had a significant hemorrhagic CVA due to uncontrolled BP with residual deficits in 11/2017. His baseline prior to CVA is unknown to me, as I did not know him prior to CVA. He is doing well without CP, SOA, Palp, Neuro symptoms, or other complaints.  He is tolerating medications well.  Blood pressure today is stable. Continue Lisinopril 20 mg once daily, amlodipine 10 mg once daily, and Labetalol 100 mg 3 x daily. Patient had to stop his statin due to significantly elevated CK as well as elevated LFT.  I asked that he stop and recheck. Cardiology saw patient in the meantime and rechecked CK twice. They advised he stay off statin for a few more months. However, CK has not been rechecked since 11/2019. He never experienced any muscle pain or weakness or felt any changes with stopping medication. I am actually concerned about other etiology of elevated CK. I will recheck and if elevated, he will need to have autoimmune testing and consider rheumatology consult as well as follow up with his neurologist. Patient was taking vitamin D 50,000 IU once weekly, however, he only took the prescription for 1 month then stopped, not realizing he had refills and should continue medication. Disability paperwork signed at last visit for managing benefits. Patient to have fasting labs today.  Stability of conditions, plan, follow-up, and further recommendations pending labs.  If stable, follow-up in 4 to 6 months.    He will keep follow up with specialists as directed by them. I have reviewed imaging, labs, and consult notes.   Patient's Specialists:  Cardiology- Dr. Bradford- last appt 9/26/2019- for nontraumatic rhabdomylolysis, hypertension, and history of hemorrhagic CVA. Advised to follow up in 6 months.    Neurology- Dr Garner-  Last appt 10/22/2019- for sequelae of left putaminal hemorrhage, stable without new symptoms, hypertension, and hyperlipidemia. He advised patient may consider Repatha if he cannot tolerate statin or Zetia but limited effect, continue ASA 81 mg once daily and antihypertensives. Follow up in 1 year.   Pulmonology- Dr Garcia- last appt 11/22/2019 for SHANE and pulmonary nodule. Stable and follow up with thoracic surgery as directed. Follow up with pulmonology in 1 year.   Thoracic surgery- Dr Palomares- last appt 9/16/2019- for pulmonary nodule- stable. Will need follow up every 6 months for 3 years. Last CT 3/9/2020- stable.   Urology- Dr. Marie- last appt 10/2019 for microhematuria- CT abd/pelvis and cystoscopy was negative. Follow up in 1 year.

## 2020-03-16 DIAGNOSIS — I10 ESSENTIAL HYPERTENSION: ICD-10-CM

## 2020-03-16 RX ORDER — AMLODIPINE BESYLATE 10 MG/1
10 TABLET ORAL DAILY
Qty: 90 TABLET | Refills: 1 | Status: SHIPPED | OUTPATIENT
Start: 2020-03-16 | End: 2020-04-28

## 2020-03-16 RX ORDER — LISINOPRIL 20 MG/1
20 TABLET ORAL DAILY
Qty: 90 TABLET | Refills: 1 | Status: SHIPPED | OUTPATIENT
Start: 2020-03-16 | End: 2020-03-20 | Stop reason: SDUPTHER

## 2020-03-16 RX ORDER — SPIRONOLACTONE 25 MG/1
25 TABLET ORAL DAILY
Qty: 90 TABLET | Refills: 1 | Status: SHIPPED | OUTPATIENT
Start: 2020-03-16 | End: 2020-03-20 | Stop reason: SDUPTHER

## 2020-03-17 DIAGNOSIS — R74.8 ELEVATED CK: Primary | ICD-10-CM

## 2020-03-17 RX ORDER — ERGOCALCIFEROL 1.25 MG/1
50000 CAPSULE ORAL
Qty: 4 CAPSULE | Refills: 4 | Status: SHIPPED | OUTPATIENT
Start: 2020-03-17 | End: 2020-09-28 | Stop reason: SDUPTHER

## 2020-03-18 ENCOUNTER — TELEPHONE (OUTPATIENT)
Dept: NEUROLOGY | Facility: CLINIC | Age: 50
End: 2020-03-18

## 2020-03-18 NOTE — TELEPHONE ENCOUNTER
----- Message from Keanu Garner MD sent at 3/17/2020  7:03 PM EDT -----  Note made that follow-up CPKs have been in the 700s after initially testing just over 1000.  It is been several months since his statin was stopped and so this appears unlikely to be statin induced although statins probably elevated it more.  The patient was asymptomatic when I saw him in the office from a myopathy/myositis point of view.  This may just be hyper CK emia.    Derrick  Set him up for a follow-up sooner than his scheduled follow-up in October.  He may fit into a cancellation slot    GNS

## 2020-03-19 LAB
ANA SER QL: NEGATIVE
CCP IGA+IGG SERPL IA-ACNC: 8 UNITS (ref 0–19)
CRP SERPL-MCNC: 1 MG/L (ref 0–10)
ERYTHROCYTE [SEDIMENTATION RATE] IN BLOOD BY WESTERGREN METHOD: 6 MM/HR (ref 0–15)
RHEUMATOID FACT SERPL-ACNC: <10 IU/ML (ref 0–13.9)
URATE SERPL-MCNC: 3.7 MG/DL (ref 3.7–8.6)

## 2020-03-20 DIAGNOSIS — I10 ESSENTIAL HYPERTENSION: ICD-10-CM

## 2020-03-20 RX ORDER — SPIRONOLACTONE 25 MG/1
25 TABLET ORAL DAILY
Qty: 90 TABLET | Refills: 1 | Status: SHIPPED | OUTPATIENT
Start: 2020-03-20 | End: 2020-03-23 | Stop reason: SDUPTHER

## 2020-03-20 RX ORDER — LISINOPRIL 20 MG/1
20 TABLET ORAL DAILY
Qty: 90 TABLET | Refills: 1 | Status: SHIPPED | OUTPATIENT
Start: 2020-03-20 | End: 2020-03-23 | Stop reason: SDUPTHER

## 2020-03-23 DIAGNOSIS — I10 ESSENTIAL HYPERTENSION: ICD-10-CM

## 2020-03-23 RX ORDER — LISINOPRIL 20 MG/1
20 TABLET ORAL DAILY
Qty: 30 TABLET | Refills: 0 | Status: SHIPPED | OUTPATIENT
Start: 2020-03-23 | End: 2020-09-16

## 2020-03-23 RX ORDER — SPIRONOLACTONE 25 MG/1
25 TABLET ORAL DAILY
Qty: 30 TABLET | Refills: 0 | Status: SHIPPED | OUTPATIENT
Start: 2020-03-23 | End: 2020-09-16

## 2020-03-24 ENCOUNTER — OFFICE VISIT (OUTPATIENT)
Dept: NEUROLOGY | Facility: CLINIC | Age: 50
End: 2020-03-24

## 2020-03-24 VITALS — WEIGHT: 248 LBS | OXYGEN SATURATION: 98 % | BODY MASS INDEX: 36.73 KG/M2 | HEART RATE: 67 BPM | HEIGHT: 69 IN

## 2020-03-24 DIAGNOSIS — I69.30 SEQUELAE, POST-STROKE: ICD-10-CM

## 2020-03-24 DIAGNOSIS — R74.8 ELEVATED CPK: Primary | ICD-10-CM

## 2020-03-24 PROCEDURE — 99214 OFFICE O/P EST MOD 30 MIN: CPT | Performed by: PSYCHIATRY & NEUROLOGY

## 2020-03-24 NOTE — PROGRESS NOTES
CC: Elevated CK    HPI:  Gaurav Mora is a  49 y.o.  right-handed white male who I am seeing in follow-up who has previous history of a left putaminal hemorrhage with resultant right-sided weakness and numbness who was found to have an elevated CK of over thousand when he was placed on a statin drug for his significantly elevated lipids.  The statin was stopped but on follow-up CPKs they remained elevated with the most recent one being earlier this month at 789.  He has normal liver function testing.  He is not on any lipid-lowering agent currently with current total cholesterol 246, HDL 41 and .    Upon reviewing my previous notes he had no evidence of a proximal myopathy.  This is what made the finding of elevated CK a bit unexpected.  Specifically he does not have problems with proximal weakness.  We discussed how he negotiates stairs because he has some right leg weakness and numbness which affects his gait.  He states there is no muscle pain and that his difficulties clearly stem from his right-sided weakness from his hemorrhage.        Past Medical History:   Diagnosis Date   • Headache, tension-type    • Hemorrhagic stroke (CMS/HCC) 11/24/2017    Residual peripheral vision loss, right-sided sensation loss   • Hypertension    • Lung nodule    • SHANE on CPAP    • Stroke (CMS/HCC)          Past Surgical History:   Procedure Laterality Date   • APPENDECTOMY             Current Outpatient Medications:   •  acetaminophen (TYLENOL) 325 MG tablet, Take 650 mg by mouth Every 4 (Four) Hours As Needed for Mild Pain ., Disp: , Rfl:   •  amLODIPine (NORVASC) 10 MG tablet, Take 1 tablet by mouth Daily. Indications: Patient will call when needed, Disp: 90 tablet, Rfl: 1  •  aspirin 81 MG tablet, Take 81 mg by mouth Daily., Disp: , Rfl:   •  labetalol (NORMODYNE) 100 MG tablet, Take 1 tablet by mouth 3 (Three) Times a Day. Indications: High Blood Pressure Disorder, pt will call when needed, Disp: 270 tablet, Rfl:  0  •  lisinopril (PRINIVIL,ZESTRIL) 20 MG tablet, Take 1 tablet by mouth Daily. Indications: Patient will call when needed, Disp: 30 tablet, Rfl: 0  •  loratadine (CLARITIN) 10 MG tablet, Take 1 tablet by mouth Daily., Disp: 90 tablet, Rfl: 1  •  spironolactone (ALDACTONE) 25 MG tablet, Take 1 tablet by mouth Daily. Indications: Patient will call when needed, Disp: 30 tablet, Rfl: 0  •  vitamin D (ERGOCALCIFEROL) 1.25 MG (74055 UT) capsule capsule, Take 1 capsule by mouth Every 7 (Seven) Days., Disp: 4 capsule, Rfl: 4      Family History   Problem Relation Age of Onset   • Breast cancer Mother    • Atrial fibrillation Father    • Hypertension Father    • Colon cancer Paternal Grandmother    • Heart disease Maternal Grandfather    • Heart disease Maternal Aunt    • Heart disease Maternal Uncle          Social History     Socioeconomic History   • Marital status: Single     Spouse name: Not on file   • Number of children: 0   • Years of education: Not on file   • Highest education level: Not on file   Occupational History   • Occupation: disability   Tobacco Use   • Smoking status: Never Smoker   • Smokeless tobacco: Never Used   • Tobacco comment: minimal caffeine   Substance and Sexual Activity   • Alcohol use: No   • Drug use: No   • Sexual activity: Defer         No Known Allergies      Pain Scale: 0/10        ROS:  Review of Systems   Constitutional: Negative for activity change, appetite change and fatigue.   Eyes: Negative for pain, redness and itching.   Respiratory: Negative for cough, choking and shortness of breath.    Cardiovascular: Negative for chest pain and leg swelling.   Gastrointestinal: Negative for abdominal pain, nausea and vomiting.   Allergic/Immunologic: Negative for environmental allergies and food allergies.   Neurological: Positive for numbness. Negative for dizziness, tremors, seizures, syncope, facial asymmetry, speech difficulty, weakness, light-headedness and headaches.  "  Psychiatric/Behavioral: Negative for agitation, behavioral problems, confusion, decreased concentration, dysphoric mood, hallucinations, self-injury, sleep disturbance and suicidal ideas. The patient is not nervous/anxious and is not hyperactive.      No muscle pain or significant neck or back pain.  No proximal weakness.  Some balance trouble related to his previous hemorrhage.    I have reviewed and agree with the above ROS completed by the medical assistant.      Physical Exam:  Vitals:    03/24/20 1526   Pulse: 67   SpO2: 98%   Weight: 112 kg (248 lb)   Height: 175.3 cm (69.02\")     Orthostatic BP:    Body mass index is 36.6 kg/m².    Physical Exam  General: Obese white male no acute distress  HEENT: Normocephalic no evidence of trauma  Neck: Supple  Heart: Regular rate and rhythm  Extremities: No pedal edema      Neurological Exam:   Mental Status: Awake, alert, oriented to person, place and time.  Conversant without evidence of an affective disorder, thought disorder, delusions or hallucinations.  Attention span and concentration are normal.  HCF: No aphasia or dysarthria.  Some dyspraxia is noted.  Recent and remote memory intact.  Knowledge of recent events intact.  CN: I:   II: Visual fields full without left inattention   III, IV, VI: Eye movements intact without nystagmus or ptosis.  Pupils equal  round and reactive to light.   V,VII: Light touch and pinprick reduced all 3 divisions of right-sided V which was normal on the left..  Facial muscles questionable flattening on the right   VIII: Hearing intact to finger rub   IX,X: Soft palate elevates symmetrically   XI: Sternomastoid and trapezius are strong.   XII: Tongue midline without atrophy or fasciculations  Motor: Increased tone on the right normal on the left with normal bulk in the upper and lower extremities   Power testing: Overall strength is pretty good in the right arm and in the right leg except for ankle and toe dorsiflexion are mildly " weak.  In the left side all muscles tested are full specific attention was paid to the shoulder and hip girdle muscles which were completely normal.  Can do a knee bend  Reflexes: Upper extremities: Symmetric +1        Lower extremities: Asymmetric brisker in the right knee        Toe signs: Upgoing right toe sign downgoing on the left  Sensory: Light touch: Reduced in the right arm and leg compared to the left        Pinprick: Reduced in the right arm and leg compared to the left        Vibration: Absent at the right ankle normal on the left        Position: Absent at the right great toe normal on the left    Cerebellar: Finger-to-nose: Mildly dyspraxic           Rapid movement: Mildly dyspraxic           Heel-to-shin: Mildly dyspraxic  Gait and Station: Comes to stand with mild difficulty.  Ambulates with mildly broad-based gait slightly unsteady.  No clear circumduction but the foot plant is flat on the right.  Armswing reduced on the right    Results:      Lab Results   Component Value Date    GLUCOSE 98 10/10/2019    BUN 13 03/11/2020    CREATININE 0.99 03/11/2020    EGFRIFNONA 81 03/11/2020    EGFRIFAFRI 98 03/11/2020    BCR 13.1 03/11/2020    CO2 26.9 03/11/2020    CALCIUM 9.1 03/11/2020    PROTENTOTREF 7.1 03/11/2020    ALBUMIN 4.60 03/11/2020    LABIL2 1.8 03/11/2020    AST 23 03/11/2020    ALT 29 03/11/2020       Lab Results   Component Value Date    WBC 5.51 03/11/2020    HGB 14.5 03/11/2020    HCT 43.4 03/11/2020    MCV 81.9 03/11/2020     03/11/2020         .No results found for: RPR      Lab Results   Component Value Date    TSH 1.720 01/08/2019         No results found for: [id number]      No results found for: FOLATE      No results found for: HGBA1C      Lab Results   Component Value Date    GLUCOSE 98 10/10/2019    BUN 13 03/11/2020    CREATININE 0.99 03/11/2020    EGFRIFNONA 81 03/11/2020    EGFRIFAFRI 98 03/11/2020    BCR 13.1 03/11/2020    K 4.5 03/11/2020    CO2 26.9 03/11/2020     CALCIUM 9.1 03/11/2020    PROTENTOTREF 7.1 03/11/2020    ALBUMIN 4.60 03/11/2020    LABIL2 1.8 03/11/2020    AST 23 03/11/2020    ALT 29 03/11/2020         Lab Results   Component Value Date    WBC 5.51 03/11/2020    HGB 14.5 03/11/2020    HCT 43.4 03/11/2020    MCV 81.9 03/11/2020     03/11/2020             Assessment:   1.  History of left putaminal hemorrhage with sequelae  2.  Elevated CK-physical exam does not demonstrate any evidence for a primary myopathic condition.  The likely answer is hyper CK emia  3.  Significant hyperlipidemia        Plan:  1.  I reviewed options such as an EMG and/or a muscle biopsy but I do not feel that it will likely demonstrate a specific cause and so I do not recommend that at this time.  Should he develop proximal weakness then I would first pursue an EMG.  2.  I have instructed him to talk to his primary physician about potentially use of Repatha regarding his significantly elevated LDL.  3.  Follow-up in 1 year or sooner if symptoms progress  4.  Periodic recheck of CPK          9/8/2020    The patient was subsequently referred to Dr. Jessie Garner, rheumatology 5/21/2020 and again 7/28/2020.  She obtained an MRI of the thigh which did not show myositis changes.  A muscle biopsy was obtained which was performed by Dr. Nina and reported in the [facility] records.  The study showed rather pronounced enlargement predominantly type I fibers with no evidence of inflammation.  Finding was nonspecific but could be seen in neuropathic as well as myotonic dystrophy patients.  As noted above the patient did not have any findings to help differentiate the origin of the CK elevation and hence the muscle biopsy findings.    GNS            Dictated utilizing Dragon dictation.

## 2020-04-15 ENCOUNTER — OFFICE VISIT (OUTPATIENT)
Dept: CARDIOLOGY | Facility: CLINIC | Age: 50
End: 2020-04-15

## 2020-04-15 VITALS
HEART RATE: 68 BPM | BODY MASS INDEX: 36.73 KG/M2 | SYSTOLIC BLOOD PRESSURE: 114 MMHG | HEIGHT: 69 IN | DIASTOLIC BLOOD PRESSURE: 81 MMHG | WEIGHT: 248 LBS

## 2020-04-15 DIAGNOSIS — I61.9 HEMORRHAGIC CEREBROVASCULAR ACCIDENT (CVA) (HCC): ICD-10-CM

## 2020-04-15 DIAGNOSIS — E78.5 HYPERLIPIDEMIA, UNSPECIFIED HYPERLIPIDEMIA TYPE: ICD-10-CM

## 2020-04-15 DIAGNOSIS — I10 ESSENTIAL HYPERTENSION: Primary | ICD-10-CM

## 2020-04-15 DIAGNOSIS — R74.8 ELEVATED CPK: ICD-10-CM

## 2020-04-15 PROCEDURE — 99443 PR PHYS/QHP TELEPHONE EVALUATION 21-30 MIN: CPT | Performed by: INTERNAL MEDICINE

## 2020-04-15 NOTE — PROGRESS NOTES
Date of Office Visit:  4/15/2020  Encounter Provider: Jeremiah Bradford MD  Place of Service: [facility] CARDIOLOGY  Patient Name: Gaurav Mora  :1970    Chief complaint: Follow-up for hypertension, prior hemorrhagic CVA, elevated   CPK level, hyperlipidemia, and ascending aorta dilatation.    History of Present Illness:    I had the pleasure of seeing the patient in cardiology office on 4/15/2020.  He is a   very pleasant 49 year-old white male with a history of hypertension, obstructive   sleep apnea, and hemorrhagic CVA who presents for follow-up. The patient's   father is with him today and assists significantly in his care.  I initially saw him in   the office on 2018.     The patient was visiting in [place], [place], for Thanksgiving in . He experienced   right sided neurological symptoms and visual disturbances on 2017. He was   brought to the hospital at that time where he was found to have an extensive   hemorrhagic CVA.  His blood pressure upon presentation was 242/130.  He was   unaware that he had hypertension prior to this, and it was felt that the hypertension   caused the brain hemorrhage.  At that time, he was been placed on amlodipine,   labetalol, lisinopril, and spironolactone with good results.  He still does have   complete loss of sensation on the right side (although he still has good strength in   the right side), as well as loss of peripheral vision.  He did have an echocardiogram   in [place] the time of his stroke on 2017 which showed an ejection fraction of   60%, and a very mildly dilated proximal aorta at 3.6 cm.  He did have a subsequent   CT scan of his chest on 2019 which showed mild enlargement of the ascending   aorta at 3.8 cm.  He also was found to have a significantly elevated CK level of 1044   on 2019.  He was taken off of Crestor at that time, and the level did decrease;  however, it did remain  chronically elevated afterwards.    The patient presents today for follow-up via telephone visit due to the   COVID-19 pandemic.  Symptomatically, he has been doing very well.  He has   had no chest pain or shortness of breath.  His blood pressure has been largely   controlled, and is 114/81 today.  He has not had any new neurological symptoms.    His CK level on 3/11/2020 was still elevated at 789.  He is not having any signs   of myopathy, and denied any muscle weakness or muscle pain.  He has been   off of the Crestor for quite some time now.  His LDL on 3/11/2020 was also   grossly elevated at 192.      Past Medical History:   Diagnosis Date   • Headache, tension-type    • Hemorrhagic stroke (CMS/HCC) 11/24/2017    Residual peripheral vision loss, right-sided sensation loss   • Hypertension    • Lung nodule    • SHANE on CPAP    • Stroke (CMS/HCC)        Past Surgical History:   Procedure Laterality Date   • APPENDECTOMY         Current Outpatient Medications on File Prior to Visit   Medication Sig Dispense Refill   • acetaminophen (TYLENOL) 325 MG tablet Take 650 mg by mouth Every 4 (Four) Hours As Needed for Mild Pain .     • amLODIPine (NORVASC) 10 MG tablet Take 1 tablet by mouth Daily. Indications: Patient will call when needed 90 tablet 1   • aspirin 81 MG tablet Take 81 mg by mouth Daily.     • labetalol (NORMODYNE) 100 MG tablet Take 1 tablet by mouth 3 (Three) Times a Day. Indications: High Blood Pressure Disorder, pt will call when needed 270 tablet 0   • lisinopril (PRINIVIL,ZESTRIL) 20 MG tablet Take 1 tablet by mouth Daily. Indications: Patient will call when needed 30 tablet 0   • loratadine (CLARITIN) 10 MG tablet Take 1 tablet by mouth Daily. 90 tablet 1   • spironolactone (ALDACTONE) 25 MG tablet Take 1 tablet by mouth Daily. Indications: Patient will call when needed 30 tablet 0   • vitamin D (ERGOCALCIFEROL) 1.25 MG (52317 UT) capsule capsule Take 1 capsule by mouth Every 7 (Seven) Days. 4  "capsule 4     No current facility-administered medications on file prior to visit.      Allergies as of 04/15/2020   • (No Known Allergies)     Social History     Socioeconomic History   • Marital status: Single     Spouse name: Not on file   • Number of children: 0   • Years of education: Not on file   • Highest education level: Not on file   Occupational History   • Occupation: disability   Tobacco Use   • Smoking status: Never Smoker   • Smokeless tobacco: Never Used   • Tobacco comment: minimal caffeine   Substance and Sexual Activity   • Alcohol use: No   • Drug use: No   • Sexual activity: Defer     Family History   Problem Relation Age of Onset   • Breast cancer Mother    • Atrial fibrillation Father    • Hypertension Father    • Colon cancer Paternal Grandmother    • Heart disease Maternal Grandfather    • Heart disease Maternal Aunt    • Heart disease Maternal Uncle        Review of Systems   All other systems reviewed and are negative.     Objective:     Vitals:    04/15/20 1136   BP: 114/81   Pulse: 68   Weight: 112 kg (248 lb)   Height: 175.3 cm (69.02\")     Body mass index is 36.61 kg/m².    Physical Exam - telephone visit  Lab Review:   Procedures    Cardiac Procedures:  1.  Echocardiogram on 11/27/2017 in [place], [place]: The ejection fraction was 60%. The   aortic root measured 3.8 cm.  The proximal aorta was 3.6 cm.  2.  Noncontrast CT scan of the chest on 9/9/2019: There was a stable pulmonary nodule   left upper lobe.  There was hepatic steatosis.  There was mild enlargement of the   ascending aorta at 3.8 cm.    Assessment:       Diagnosis Plan   1. Essential hypertension     2. Hyperlipidemia, unspecified hyperlipidemia type     3. Hemorrhagic cerebrovascular accident (CVA) (CMS/HCC)     4. Elevated CPK       Plan:       With regards to the elevated CK level, this has come down since stopping the Crestor several months ago, although it remains elevated at 789.  He has seen neurology, and is " scheduled to see rheumatology as well.  He is not having any myopathic symptoms such as muscle weakness or myalgias.  However, I do not feel that statins are appropriate with this level of CK elevation.  His LDL was grossly elevated at 192 on 3/11/2020.  I am going to see if I can get him approved for Praluent.  I discussed this in detail with the patient and his father today, and they were in agreement with this plan.    He will continue on the amlodipine, labetalol, lisinopril, and spironolactone.  His blood pressure has been good, and he does check this frequently.  His last potassium was 4.5 on the lisinopril and spironolactone.  He is faithful about wearing his CPAP mask at night.  He should remain on the aspirin for life given his history of stroke.  His aorta was mildly dilated at 3.8 cm on the scan from 9/9/2019.  He will need another scan in the future, although I do not feel this needs to be done currently.  Again, his father assists greatly in his care as he does have residual neurological sequelae from the hemorrhagic stroke.  I will have him follow-up in 6 months.    This patient has consented to a telehealth visit via [employer]. The visit was scheduled as a telephone visit to comply with patient safety concerns in accordance with CDC recommendations.  All vitals recorded within this visit are reported by the patient.  I spent 25 minutes in total including but not limited to the 12 minutes spent in direct conversation with this patient.

## 2020-04-16 NOTE — TELEPHONE ENCOUNTER
This has been denied due to no previous use of Repatha. Documents will be sent to the office for further review. Can you please see if it is possible to try repatha instead?

## 2020-04-17 NOTE — TELEPHONE ENCOUNTER
Bailey  Yes he verbally stated he  Is Okay with switching to Repatha.  Thanks for you help on this.   Harpreet SINGH

## 2020-04-28 DIAGNOSIS — I10 ESSENTIAL HYPERTENSION: ICD-10-CM

## 2020-04-28 RX ORDER — AMLODIPINE BESYLATE 10 MG/1
TABLET ORAL
Qty: 90 TABLET | Refills: 1 | Status: SHIPPED | OUTPATIENT
Start: 2020-04-28 | End: 2020-10-08

## 2020-05-04 ENCOUNTER — OFFICE VISIT (OUTPATIENT)
Dept: SURGERY | Facility: CLINIC | Age: 50
End: 2020-05-04

## 2020-05-04 DIAGNOSIS — R91.1 LUNG NODULE: Primary | ICD-10-CM

## 2020-05-04 PROCEDURE — 99442 PR PHYS/QHP TELEPHONE EVALUATION 11-20 MIN: CPT | Performed by: THORACIC SURGERY (CARDIOTHORACIC VASCULAR SURGERY)

## 2020-05-04 NOTE — PROGRESS NOTES
Subjective   Patient ID: Gaurav Mora is a 49 y.o. male is here today for follow-up via telephone visit.    You have chosen to receive care through a telephone visit. Do you consent to use a telephone visit for your medical care today? Yes      History of Present Illness  Dear Colleague,  Gaurav Mora was seen via telephone today for continued follow up and surveillance postoperative visit after surgery for lung nodule.  He denies any complaints of fever, chills, cough, hemoptysis, pleuritic chest pain, shortness of air, dyspnea with exertion, night sweats, hoarseness, or unintentional weight loss. Underlying medical conditions including hypertension remain stable.  He has no other somatic complaints or alleviating or exacerbating factors aside from those mentioned above.    Mr. Mora has no new complaints today.    The following portions of the patient's history were reviewed and updated as appropriate: allergies, current medications, past family history, past medical history, past social history, past surgical history and problem list.  Review of Systems   Constitution: Negative.   HENT: Negative.    Eyes: Negative.    Cardiovascular: Negative.    Respiratory: Negative.    Endocrine: Negative.    Hematologic/Lymphatic: Negative.    Skin: Negative.    Musculoskeletal: Negative.    Gastrointestinal: Negative.    Genitourinary: Negative.    Neurological: Negative.    Psychiatric/Behavioral: Negative.    Allergic/Immunologic: Negative.      Patient Active Problem List   Diagnosis   • Essential hypertension   • Alteration of sensations, post-stroke   • Expressive aphasia   • Obstructive sleep apnea   • Hyperlipidemia LDL goal <70   • Slow transit constipation   • Hemorrhagic stroke (CMS/HCC)     Past Medical History:   Diagnosis Date   • Headache, tension-type    • Hemorrhagic stroke (CMS/HCC) 11/24/2017    Residual peripheral vision loss, right-sided sensation loss   • Hypertension    • Lung nodule    • SHANE on  CPAP    • Stroke (CMS/HCC)      Past Surgical History:   Procedure Laterality Date   • APPENDECTOMY       Family History   Problem Relation Age of Onset   • Breast cancer Mother    • Atrial fibrillation Father    • Hypertension Father    • Colon cancer Paternal Grandmother    • Heart disease Maternal Grandfather    • Heart disease Maternal Aunt    • Heart disease Maternal Uncle      Social History     Socioeconomic History   • Marital status: Single     Spouse name: Not on file   • Number of children: 0   • Years of education: Not on file   • Highest education level: Not on file   Occupational History   • Occupation: disability   Tobacco Use   • Smoking status: Never Smoker   • Smokeless tobacco: Never Used   • Tobacco comment: minimal caffeine   Substance and Sexual Activity   • Alcohol use: No   • Drug use: No   • Sexual activity: Defer       Current Outpatient Medications:   •  acetaminophen (TYLENOL) 325 MG tablet, Take 650 mg by mouth Every 4 (Four) Hours As Needed for Mild Pain ., Disp: , Rfl:   •  amLODIPine (NORVASC) 10 MG tablet, TAKE 1 TABLET DAILY, Disp: 90 tablet, Rfl: 1  •  aspirin 81 MG tablet, Take 81 mg by mouth Daily., Disp: , Rfl:   •  Evolocumab 140 MG/ML solution auto-injector, Inject 1 mL under the skin into the appropriate area as directed Every 14 (Fourteen) Days., Disp: 2.1 mL, Rfl: 6  •  labetalol (NORMODYNE) 100 MG tablet, Take 1 tablet by mouth 3 (Three) Times a Day. Indications: High Blood Pressure Disorder, pt will call when needed, Disp: 270 tablet, Rfl: 0  •  lisinopril (PRINIVIL,ZESTRIL) 20 MG tablet, Take 1 tablet by mouth Daily. Indications: Patient will call when needed, Disp: 30 tablet, Rfl: 0  •  loratadine (CLARITIN) 10 MG tablet, Take 1 tablet by mouth Daily., Disp: 90 tablet, Rfl: 1  •  spironolactone (ALDACTONE) 25 MG tablet, Take 1 tablet by mouth Daily. Indications: Patient will call when needed, Disp: 30 tablet, Rfl: 0  •  vitamin D (ERGOCALCIFEROL) 1.25 MG (82657 UT)  capsule capsule, Take 1 capsule by mouth Every 7 (Seven) Days., Disp: 4 capsule, Rfl: 4  No Known Allergies     Objective   There were no vitals filed for this visit.  Physical Exam   No physical exam secondary to telephone visit  Independent Review of Radiographic Studies:    I have independently reviewed the CT of the chest performed on 3/9/2020 which demonstrates a stable 7 mm left upper lobe pulmonary nodule.  There are no other pulmonary nodules.  There is a calcified left hilar lymph node.  There are no mediastinal lymphadenopathy.  There is no pleural pericardial effusion.  Assessment/Plan   Mr. Mora is a very pleasant 49-year-old woman with a 7 mm left upper lobe pulmonary nodule that is stable.  We will plan to continue his surveillance of this nodule with a CT of the chest in 1 year per Fleischner guidelines.    I have spent approximately 11 minutes on the phone Mr. Mora today.  Diagnoses and all orders for this visit:    Lung nodule  -     CT Chest Without Contrast; Future

## 2020-05-19 RX ORDER — LABETALOL 100 MG/1
TABLET, FILM COATED ORAL
Qty: 270 TABLET | Refills: 0 | Status: SHIPPED | OUTPATIENT
Start: 2020-05-19 | End: 2020-08-18

## 2020-08-12 ENCOUNTER — OFFICE VISIT (OUTPATIENT)
Dept: SURGERY | Facility: CLINIC | Age: 50
End: 2020-08-12

## 2020-08-12 ENCOUNTER — PREP FOR SURGERY (OUTPATIENT)
Dept: OTHER | Facility: HOSPITAL | Age: 50
End: 2020-08-12

## 2020-08-12 VITALS — HEIGHT: 69 IN | BODY MASS INDEX: 35.7 KG/M2 | WEIGHT: 241 LBS

## 2020-08-12 DIAGNOSIS — R74.8 ELEVATED CK: Primary | ICD-10-CM

## 2020-08-12 PROCEDURE — 99204 OFFICE O/P NEW MOD 45 MIN: CPT | Performed by: SURGERY

## 2020-08-12 RX ORDER — OXYCODONE HCL 10 MG/1
10 TABLET, FILM COATED, EXTENDED RELEASE ORAL ONCE
Status: CANCELLED | OUTPATIENT
Start: 2020-08-19 | End: 2020-08-12

## 2020-08-12 RX ORDER — ACETAMINOPHEN 500 MG
1000 TABLET ORAL ONCE
Status: CANCELLED | OUTPATIENT
Start: 2020-08-19 | End: 2020-08-12

## 2020-08-12 RX ORDER — SODIUM CHLORIDE 0.9 % (FLUSH) 0.9 %
3 SYRINGE (ML) INJECTION EVERY 12 HOURS SCHEDULED
Status: CANCELLED | OUTPATIENT
Start: 2020-08-19

## 2020-08-12 RX ORDER — LEVOCETIRIZINE DIHYDROCHLORIDE 5 MG/1
5 TABLET, FILM COATED ORAL EVERY EVENING
COMMUNITY
End: 2020-09-28

## 2020-08-12 RX ORDER — SODIUM CHLORIDE 0.9 % (FLUSH) 0.9 %
10 SYRINGE (ML) INJECTION AS NEEDED
Status: CANCELLED | OUTPATIENT
Start: 2020-08-19

## 2020-08-12 RX ORDER — AZELASTINE HYDROCHLORIDE, FLUTICASONE PROPIONATE 137; 50 UG/1; UG/1
2 SPRAY, METERED NASAL AS NEEDED
COMMUNITY
End: 2022-11-01

## 2020-08-12 RX ORDER — OMEPRAZOLE 20 MG/1
20 CAPSULE, DELAYED RELEASE ORAL DAILY
COMMUNITY

## 2020-08-12 RX ORDER — FLUTICASONE PROPIONATE 50 MCG
2 SPRAY, SUSPENSION (ML) NASAL DAILY
COMMUNITY
End: 2022-02-16

## 2020-08-12 NOTE — PROGRESS NOTES
SURGERY  Gaurav Mora   1970 08/12/2020    Chief Complaint:  Request for muscle biopsy.    HPI    Mr. Mora is a nice nice 50 y.o. male who presents for consideration of a muscle biopsy.  He is referred by Dr. Jessie Garner.  He has had difficulty with an elevated CK, that started around 1200, diminished when he got off his Crestor to around 750, but has remained in the 1100 range when being checked at Dr. Garner's office.  He has had a stroke with a right-sided sensational deficit, with no described motor deficit.  However because of this sensation deficit, his stride is asymmetric, with his right foot searching before he can set it down and thus perhaps contributing to this issue.  He has been seen by Dr. Keanu Garner who did not feel an EMG would be helpful.  He had an MRI of the left thigh that was normal he had a myositis panel that was negative, ANAHI that was negative and a slightly elevated aldolase through Dr. Garner.  She is so sent him for the muscle biopsy.    He describes no weakness whatsoever.  He has no family history of any muscle disorder.    Complicating decision-making for organ surgery are his sleep apnea, hypertension, prior stroke.    Past Medical History:   Diagnosis Date   • Headache, tension-type    • Hemorrhagic stroke (CMS/HCC) 11/24/2017    Residual peripheral vision loss, right-sided sensation loss   • Hyperlipidemia    • Hypertension    • Lung nodule    • SHANE on CPAP    • Stroke (CMS/HCC)      Past Surgical History:   Procedure Laterality Date   • APPENDECTOMY N/A 1987     Family History   Problem Relation Age of Onset   • Breast cancer Mother    • Atrial fibrillation Father    • Hypertension Father    • Skin cancer Father    • Colon cancer Paternal Grandmother    • Heart disease Maternal Grandfather    • Heart disease Maternal Aunt    • Heart disease Maternal Uncle      Social History     Socioeconomic History   • Marital status: Single     Spouse name: Not on file   • Number of  "children: 0   • Years of education: Not on file   • Highest education level: Not on file   Occupational History     Employer: DISABLED   Tobacco Use   • Smoking status: Never Smoker   • Smokeless tobacco: Never Used   • Tobacco comment: minimal caffeine   Substance and Sexual Activity   • Alcohol use: No   • Drug use: No   • Sexual activity: Defer         Current Outpatient Medications:   •  acetaminophen (TYLENOL) 325 MG tablet, Take 650 mg by mouth Every 4 (Four) Hours As Needed for Mild Pain ., Disp: , Rfl:   •  amLODIPine (NORVASC) 10 MG tablet, TAKE 1 TABLET DAILY, Disp: 90 tablet, Rfl: 1  •  aspirin 81 MG tablet, Take 81 mg by mouth Daily., Disp: , Rfl:   •  Azelastine-Fluticasone 137-50 MCG/ACT suspension, 2 sprays into the nostril(s) as directed by provider Daily., Disp: , Rfl:   •  fluticasone (FLONASE) 50 MCG/ACT nasal spray, 2 sprays into the nostril(s) as directed by provider Daily., Disp: , Rfl:   •  labetalol (NORMODYNE) 100 MG tablet, TAKE 1 TABLET THREE TIMES A DAY FOR HIGH BLOOD PRESSURE DISORDER, Disp: 270 tablet, Rfl: 0  •  levocetirizine (XYZAL) 5 MG tablet, Take 5 mg by mouth Every Evening., Disp: , Rfl:   •  lisinopril (PRINIVIL,ZESTRIL) 20 MG tablet, Take 1 tablet by mouth Daily. Indications: Patient will call when needed, Disp: 30 tablet, Rfl: 0  •  omeprazole (priLOSEC) 20 MG capsule, Take 20 mg by mouth Daily., Disp: , Rfl:   •  spironolactone (ALDACTONE) 25 MG tablet, Take 1 tablet by mouth Daily. Indications: Patient will call when needed, Disp: 30 tablet, Rfl: 0  •  vitamin D (ERGOCALCIFEROL) 1.25 MG (33522 UT) capsule capsule, Take 1 capsule by mouth Every 7 (Seven) Days., Disp: 4 capsule, Rfl: 4    No Known Allergies  Review of Systems  Positive for congestion, postnasal drip, sleep apnea, constipation, gait problem, numbness, tremors, agitation  All other systems reviewed and negative.    Vitals:    08/12/20 1426   Weight: 109 kg (241 lb)   Height: 175.3 cm (69\")       PHYSICAL " "EXAM:    Ht 175.3 cm (69\")   Wt 109 kg (241 lb)   BMI 35.59 kg/m²   Body mass index is 35.59 kg/m².    Constitutional: well developed, well nourished, appears  healthy, stated age  Eyes: sclera nonicteric, conjunctiva not injected   ENMT: Hearing intact, trachea midline, dentitia not seen with mask in place  CVS: RRR, no murmur, peripheral edema not present  Respiratory: CTA, normal respiratory effort   Gastrointestinal: no hepatosplenomegaly, abdomen soft, nontender, abdominal hernia not detected  Musculoskeletal: gait asymmetric, with his right foot hammering a little prior to being said down and then a \"jump\" as his father puts it, muscle mass normal  Skin: warm and dry, no rashes visible  Neurological: awake and alert, seems to have reasonable capacity for understanding for medical decision making, seems to be able to answer questions effectively  Psychiatric: appears to have reasonable judgement, pleasant  Lymphatics: no cervical adenopathy    Radiographic/Lab Findings: As above    IMPRESSION:  · Elevated CK, 1100 range, despite cessation of Crestor, without weakness, negative ANAHI, mildly elevated aldolase  · Status post stroke with right-sided sensation deficit  · Hypertension on lisinopril, Aldactone, Norvasc, labetalol  · Aspirin use    PLAN:  · Quadricep muscle biopsy.  I have put a call into Dr. Garner to see if she would prefer that I do the right side as it may be the reason that he is having this or if she wants me to avoid that since that may be affected by his stroke.  I suspect that she is going to want a left quadricep.  I do not see any reason that we would want to consider a deltoid.  · Anesthesia type at the discretion of the anesthesia group.  I sense that in general may be necessary for him.  · Hold aspirin for 4 days preop  · Discussed the fact that this does not go for routine pathology but instead goes for electron microscopy, which can take up to 2 weeks for results and then will need to " be interpreted by his rheumatologist.  · Discussed the expected postop operative discomfort and some diminishment in motor function temporarily due to the muscle injury    Paulina Nina MD  08/12/2020  3:49 PM

## 2020-08-12 NOTE — H&P (VIEW-ONLY)
SURGERY  Gaurav Mora   1970 08/12/2020    Chief Complaint:  Request for muscle biopsy.    HPI    Mr. Mora is a nice nice 50 y.o. male who presents for consideration of a muscle biopsy.  He is referred by Dr. Jessie Garner.  He has had difficulty with an elevated CK, that started around 1200, diminished when he got off his Crestor to around 750, but has remained in the 1100 range when being checked at Dr. Garner's office.  He has had a stroke with a right-sided sensational deficit, with no described motor deficit.  However because of this sensation deficit, his stride is asymmetric, with his right foot searching before he can set it down and thus perhaps contributing to this issue.  He has been seen by Dr. Keanu Garner who did not feel an EMG would be helpful.  He had an MRI of the left thigh that was normal he had a myositis panel that was negative, ANAHI that was negative and a slightly elevated aldolase through Dr. Garner.  She is so sent him for the muscle biopsy.    He describes no weakness whatsoever.  He has no family history of any muscle disorder.    Complicating decision-making for organ surgery are his sleep apnea, hypertension, prior stroke.    Past Medical History:   Diagnosis Date   • Headache, tension-type    • Hemorrhagic stroke (CMS/HCC) 11/24/2017    Residual peripheral vision loss, right-sided sensation loss   • Hyperlipidemia    • Hypertension    • Lung nodule    • SHANE on CPAP    • Stroke (CMS/HCC)      Past Surgical History:   Procedure Laterality Date   • APPENDECTOMY N/A 1987     Family History   Problem Relation Age of Onset   • Breast cancer Mother    • Atrial fibrillation Father    • Hypertension Father    • Skin cancer Father    • Colon cancer Paternal Grandmother    • Heart disease Maternal Grandfather    • Heart disease Maternal Aunt    • Heart disease Maternal Uncle      Social History     Socioeconomic History   • Marital status: Single     Spouse name: Not on file   • Number of  "children: 0   • Years of education: Not on file   • Highest education level: Not on file   Occupational History     Employer: DISABLED   Tobacco Use   • Smoking status: Never Smoker   • Smokeless tobacco: Never Used   • Tobacco comment: minimal caffeine   Substance and Sexual Activity   • Alcohol use: No   • Drug use: No   • Sexual activity: Defer         Current Outpatient Medications:   •  acetaminophen (TYLENOL) 325 MG tablet, Take 650 mg by mouth Every 4 (Four) Hours As Needed for Mild Pain ., Disp: , Rfl:   •  amLODIPine (NORVASC) 10 MG tablet, TAKE 1 TABLET DAILY, Disp: 90 tablet, Rfl: 1  •  aspirin 81 MG tablet, Take 81 mg by mouth Daily., Disp: , Rfl:   •  Azelastine-Fluticasone 137-50 MCG/ACT suspension, 2 sprays into the nostril(s) as directed by provider Daily., Disp: , Rfl:   •  fluticasone (FLONASE) 50 MCG/ACT nasal spray, 2 sprays into the nostril(s) as directed by provider Daily., Disp: , Rfl:   •  labetalol (NORMODYNE) 100 MG tablet, TAKE 1 TABLET THREE TIMES A DAY FOR HIGH BLOOD PRESSURE DISORDER, Disp: 270 tablet, Rfl: 0  •  levocetirizine (XYZAL) 5 MG tablet, Take 5 mg by mouth Every Evening., Disp: , Rfl:   •  lisinopril (PRINIVIL,ZESTRIL) 20 MG tablet, Take 1 tablet by mouth Daily. Indications: Patient will call when needed, Disp: 30 tablet, Rfl: 0  •  omeprazole (priLOSEC) 20 MG capsule, Take 20 mg by mouth Daily., Disp: , Rfl:   •  spironolactone (ALDACTONE) 25 MG tablet, Take 1 tablet by mouth Daily. Indications: Patient will call when needed, Disp: 30 tablet, Rfl: 0  •  vitamin D (ERGOCALCIFEROL) 1.25 MG (25536 UT) capsule capsule, Take 1 capsule by mouth Every 7 (Seven) Days., Disp: 4 capsule, Rfl: 4    No Known Allergies  Review of Systems  Positive for congestion, postnasal drip, sleep apnea, constipation, gait problem, numbness, tremors, agitation  All other systems reviewed and negative.    Vitals:    08/12/20 1426   Weight: 109 kg (241 lb)   Height: 175.3 cm (69\")       PHYSICAL " "EXAM:    Ht 175.3 cm (69\")   Wt 109 kg (241 lb)   BMI 35.59 kg/m²   Body mass index is 35.59 kg/m².    Constitutional: well developed, well nourished, appears  healthy, stated age  Eyes: sclera nonicteric, conjunctiva not injected   ENMT: Hearing intact, trachea midline, dentitia not seen with mask in place  CVS: RRR, no murmur, peripheral edema not present  Respiratory: CTA, normal respiratory effort   Gastrointestinal: no hepatosplenomegaly, abdomen soft, nontender, abdominal hernia not detected  Musculoskeletal: gait asymmetric, with his right foot hammering a little prior to being said down and then a \"jump\" as his father puts it, muscle mass normal  Skin: warm and dry, no rashes visible  Neurological: awake and alert, seems to have reasonable capacity for understanding for medical decision making, seems to be able to answer questions effectively  Psychiatric: appears to have reasonable judgement, pleasant  Lymphatics: no cervical adenopathy    Radiographic/Lab Findings: As above    IMPRESSION:  · Elevated CK, 1100 range, despite cessation of Crestor, without weakness, negative ANAHI, mildly elevated aldolase  · Status post stroke with right-sided sensation deficit  · Hypertension on lisinopril, Aldactone, Norvasc, labetalol  · Aspirin use    PLAN:  · Quadricep muscle biopsy.  I have put a call into Dr. Garner to see if she would prefer that I do the right side as it may be the reason that he is having this or if she wants me to avoid that since that may be affected by his stroke.  I suspect that she is going to want a left quadricep.  I do not see any reason that we would want to consider a deltoid.  · Anesthesia type at the discretion of the anesthesia group.  I sense that in general may be necessary for him.  · Hold aspirin for 4 days preop  · Discussed the fact that this does not go for routine pathology but instead goes for electron microscopy, which can take up to 2 weeks for results and then will need to " be interpreted by his rheumatologist.  · Discussed the expected postop operative discomfort and some diminishment in motor function temporarily due to the muscle injury    Paulina Nina MD  08/12/2020  3:49 PM

## 2020-08-17 ENCOUNTER — APPOINTMENT (OUTPATIENT)
Dept: PREADMISSION TESTING | Facility: HOSPITAL | Age: 50
End: 2020-08-17

## 2020-08-17 VITALS
SYSTOLIC BLOOD PRESSURE: 122 MMHG | HEIGHT: 69 IN | WEIGHT: 242 LBS | RESPIRATION RATE: 20 BRPM | TEMPERATURE: 98.5 F | DIASTOLIC BLOOD PRESSURE: 81 MMHG | OXYGEN SATURATION: 99 % | BODY MASS INDEX: 35.84 KG/M2 | HEART RATE: 59 BPM

## 2020-08-17 LAB
ANION GAP SERPL CALCULATED.3IONS-SCNC: 11.5 MMOL/L (ref 5–15)
BUN SERPL-MCNC: 13 MG/DL (ref 6–20)
BUN/CREAT SERPL: 11.7 (ref 7–25)
CALCIUM SPEC-SCNC: 9.5 MG/DL (ref 8.6–10.5)
CHLORIDE SERPL-SCNC: 101 MMOL/L (ref 98–107)
CO2 SERPL-SCNC: 24.5 MMOL/L (ref 22–29)
CREAT SERPL-MCNC: 1.11 MG/DL (ref 0.76–1.27)
DEPRECATED RDW RBC AUTO: 38 FL (ref 37–54)
ERYTHROCYTE [DISTWIDTH] IN BLOOD BY AUTOMATED COUNT: 12.5 % (ref 12.3–15.4)
GFR SERPL CREATININE-BSD FRML MDRD: 71 ML/MIN/1.73
GLUCOSE SERPL-MCNC: 95 MG/DL (ref 65–99)
HCT VFR BLD AUTO: 45 % (ref 37.5–51)
HGB BLD-MCNC: 14.6 G/DL (ref 13–17.7)
MCH RBC QN AUTO: 27.2 PG (ref 26.6–33)
MCHC RBC AUTO-ENTMCNC: 32.4 G/DL (ref 31.5–35.7)
MCV RBC AUTO: 83.8 FL (ref 79–97)
PLATELET # BLD AUTO: 232 10*3/MM3 (ref 140–450)
PMV BLD AUTO: 10 FL (ref 6–12)
POTASSIUM SERPL-SCNC: 4.3 MMOL/L (ref 3.5–5.2)
RBC # BLD AUTO: 5.37 10*6/MM3 (ref 4.14–5.8)
SODIUM SERPL-SCNC: 137 MMOL/L (ref 136–145)
WBC # BLD AUTO: 6.11 10*3/MM3 (ref 3.4–10.8)

## 2020-08-17 PROCEDURE — C9803 HOPD COVID-19 SPEC COLLECT: HCPCS

## 2020-08-17 PROCEDURE — U0004 COV-19 TEST NON-CDC HGH THRU: HCPCS | Performed by: NURSE PRACTITIONER

## 2020-08-17 PROCEDURE — 93005 ELECTROCARDIOGRAM TRACING: CPT

## 2020-08-17 PROCEDURE — 36415 COLL VENOUS BLD VENIPUNCTURE: CPT

## 2020-08-17 PROCEDURE — 93010 ELECTROCARDIOGRAM REPORT: CPT | Performed by: INTERNAL MEDICINE

## 2020-08-17 PROCEDURE — 85027 COMPLETE CBC AUTOMATED: CPT | Performed by: SURGERY

## 2020-08-17 PROCEDURE — 80048 BASIC METABOLIC PNL TOTAL CA: CPT | Performed by: SURGERY

## 2020-08-17 RX ORDER — CHLORHEXIDINE GLUCONATE 500 MG/1
CLOTH TOPICAL
COMMUNITY
End: 2020-08-19 | Stop reason: HOSPADM

## 2020-08-17 NOTE — DISCHARGE INSTRUCTIONS
ARRIVE DAY OF SURGERY AT 8:30 AM TO MAIN SURGERY        Take the following medications the morning of surgery:  LABETALOL, OMEPRAZOLE, SPIRONOLACTONE. FLONASE, AXELASTINE & XYZAL      If you are on prescription narcotic pain medication to control your pain you may also take that medication the morning of surgery.    General Instructions:  • Do not eat solid food after midnight the night before surgery.  • You may drink clear liquids day of surgery but must stop at least one hour before your hospital arrival time.  • It is beneficial for you to have a clear drink that contains carbohydrates the day of surgery.  We suggest a 12 to 20 ounce bottle of Gatorade or Powerade for non-diabetic patients or a 12 to 20 ounce bottle of G2 or Powerade Zero for diabetic patients. (Pediatric patients, are not advised to drink a 12 to 20 ounce carbohydrate drink)    Clear liquids are liquids you can see through.  Nothing red in color.     Plain water                               Sports drinks  Sodas                                   Gelatin (Jell-O)  Fruit juices without pulp such as white grape juice and apple juice  Popsicles that contain no fruit or yogurt  Tea or coffee (no cream or milk added)  Gatorade / Powerade  G2 / Powerade Zero    • Infants may have breast milk up to four hours before surgery.  • Infants drinking formula may drink formula up to six hours before surgery.   • Patients who avoid smoking, chewing tobacco and alcohol for 4 weeks prior to surgery have a reduced risk of post-operative complications.  Quit smoking as many days before surgery as you can.  • Do not smoke, use chewing tobacco or drink alcohol the day of surgery.   • If applicable bring your C-PAP/ BI-PAP machine.  • Bring any papers given to you in the doctor’s office.  • Wear clean comfortable clothes.  • Do not wear contact lenses, false eyelashes or make-up.  Bring a case for your glasses.   • Bring crutches or walker if applicable.  • Remove  all piercings.  Leave jewelry and any other valuables at home.  • Hair extensions with metal clips must be removed prior to surgery.  • The Pre-Admission Testing nurse will instruct you to bring medications if unable to obtain an accurate list in Pre-Admission Testing.            Preventing a Surgical Site Infection:  • For 2 to 3 days before surgery, avoid shaving with a razor because the razor can irritate skin and make it easier to develop an infection.    • Any areas of open skin can increase the risk of a post-operative wound infection by allowing bacteria to enter and travel throughout the body.  Notify your surgeon if you have any skin wounds / rashes even if it is not near the expected surgical site.  The area will need assessed to determine if surgery should be delayed until it is healed.  • The night prior to surgery shower using a fresh bar of anti-bacterial soap (such as Dial) and clean washcloth.  Sleep in a clean bed with clean clothing.  Do not allow pets to sleep with you.  • Shower on the morning of surgery using a fresh bar of anti-bacterial soap (such as Dial) and clean washcloth.  Dry with a clean towel and dress in clean clothing.  • Ask your surgeon if you will be receiving antibiotics prior to surgery.  • Make sure you, your family, and all healthcare providers clean their hands with soap and water or an alcohol based hand  before caring for you or your wound.    Day of surgery:  Your arrival time is approximately two hours before your scheduled surgery time.  Upon arrival, a Pre-op nurse and Anesthesiologist will review your health history, obtain vital signs, and answer questions you may have.  The only belongings needed at this time will be a list of your home medications and if applicable your C-PAP/BI-PAP machine.  If you are staying overnight your family can leave the rest of your belongings in the car and bring them to your room later.  A Pre-op nurse will start an IV and you  may receive medication in preparation for surgery, including something to help you relax.  Your family will be able to see you in the Pre-op area.  Two visitors at a time will be allowed in the Pre-op room.  While you are in surgery your family should notify the waiting room  if they leave the waiting room area and provide a contact phone number.    Please be aware that surgery does come with discomfort.  We want to make every effort to control your discomfort so please discuss any uncontrolled symptoms with your nurse.   Your doctor will most likely have prescribed pain medications.      If you are going home after surgery you will receive individualized written care instructions before being discharged.  A responsible adult must drive you to and from the hospital on the day of your surgery and stay with you for 24 hours.    If you are staying overnight following surgery, you will be transported to your hospital room following the recovery period.  [facility] has all private rooms.    If you have any questions please call Pre-Admission Testing at [phone].  Deductibles and co-payments are collected on the day of service. Please be prepared to pay the required co-pay, deductible or deposit on the day of service as defined by your plan.    Patient Education for Self-Quarantine Process    Following your COVID testing, we strongly recommend that you do not leave your home after you have been tested for COVID except to get medical care. This includes not going to work, school or to public areas.  If this is not possible for you to do please limit your activities to only required outings.  Be sure to wear a mask when you are with other people, practice social distancing and wash your hands frequently.      The following items provide additional details to keep you safe.  • Wash your hands with soap and water frequently for at least 20 seconds.   • Avoid touching your eyes, nose and mouth  with unwashed hands.  • Do not share anything - utensils, towels, food from the same bowl.   • Have your own utensils, drinking glass, dishes, towels and bedding.   • Do not have visitors.   • Do use FaceTime to stay in touch with family and friends.  • You should stay in a specific room away from others if possible.   • Stay at least 6 feet away from others in the home if you cannot have a dedicated room to yourself.   • Do not snuggle with your pet. While the CDC says there is no evidence that pets can spread COVID-19 or be infected from humans, it is probably best to avoid “petting, snuggling, being kissed or licked and sharing food (during self-quarantine)”, according to the CDC.   • Sanitize household surfaces daily. Include all high touch areas (door handles, light switches, phones, countertops, etc.)  • Do not share a bathroom with others, if possible.   • Wear a mask around others in your home if you are unable to stay in a separate room or 6 feet apart. If  you are unable to wear a mask, have your family member wear a mask if they must be within 6 feet of you.   Call your surgeon immediately if you experience any of the following symptoms:  • Sore Throat  • Shortness of Breath or difficulty breathing  • Cough  • Chills  • Body soreness or muscle pain  • Headache  • Fever  • New loss of taste or smell  • Do not arrive for your surgery ill.  Your procedure will need to be rescheduled to another time.  You will need to call your physician before the day of surgery to avoid any unnecessary exposure to hospital staff as well as other patients.    CHLORHEXIDINE CLOTH INSTRUCTIONS  The morning of surgery follow these instructions using the Chlorhexidine cloths you've been given.  These steps reduce bacteria on the body.  Do not use the cloths near your eyes, ears mouth, genitalia or on open wounds.  Throw the cloths away after use but do not try to flush them down a toilet.      • Open and remove one cloth at a  time from the package.    • Leave the cloth unfolded and begin the bathing.  • Massage the skin with the cloths using gentle pressure to remove bacteria.  Do not scrub harshly.   • Follow the steps below with one 2% CHG cloth per area (6 total cloths).  • One cloth for neck, shoulders and chest.  • One cloth for both arms, hands, fingers and underarms (do underarms last).  • One cloth for the abdomen followed by groin.  • One cloth for right leg and foot including between the toes.  • One cloth for left leg and foot including between the toes.  • The last cloth is to be used for the back of the neck, back and buttocks.    Allow the CHG to air dry 3 minutes on the skin which will give it time to work and decrease the chance of irritation.  The skin may feel sticky until it is dry.  Do not rinse with water or any other liquid or you will lose the beneficial effects of the CHG.  If mild skin irritation occurs, do rinse the skin to remove the CHG.  Report this to the nurse at time of admission.  Do not apply lotions, creams, ointments, deodorants or perfumes after using the clothes. Dress in clean clothes before coming to the hospital.

## 2020-08-18 ENCOUNTER — TELEPHONE (OUTPATIENT)
Dept: SURGERY | Facility: CLINIC | Age: 50
End: 2020-08-18

## 2020-08-18 LAB
REF LAB TEST METHOD: NORMAL
SARS-COV-2 RNA RESP QL NAA+PROBE: NOT DETECTED

## 2020-08-18 RX ORDER — LABETALOL 100 MG/1
TABLET, FILM COATED ORAL
Qty: 270 TABLET | Refills: 0 | Status: SHIPPED | OUTPATIENT
Start: 2020-08-18 | End: 2020-10-26

## 2020-08-19 ENCOUNTER — ANESTHESIA EVENT (OUTPATIENT)
Dept: PERIOP | Facility: HOSPITAL | Age: 50
End: 2020-08-19

## 2020-08-19 ENCOUNTER — HOSPITAL ENCOUNTER (OUTPATIENT)
Facility: HOSPITAL | Age: 50
Setting detail: HOSPITAL OUTPATIENT SURGERY
Discharge: HOME | End: 2020-08-19
Attending: SURGERY | Admitting: SURGERY

## 2020-08-19 ENCOUNTER — ANESTHESIA (OUTPATIENT)
Dept: PERIOP | Facility: HOSPITAL | Age: 50
End: 2020-08-19

## 2020-08-19 VITALS
RESPIRATION RATE: 16 BRPM | HEART RATE: 70 BPM | HEIGHT: 69 IN | OXYGEN SATURATION: 99 % | SYSTOLIC BLOOD PRESSURE: 120 MMHG | DIASTOLIC BLOOD PRESSURE: 77 MMHG | BODY MASS INDEX: 35.46 KG/M2 | WEIGHT: 239.42 LBS | TEMPERATURE: 98.4 F

## 2020-08-19 DIAGNOSIS — R74.8 ELEVATED CK: ICD-10-CM

## 2020-08-19 PROCEDURE — 25010000002 FENTANYL CITRATE (PF) 100 MCG/2ML SOLUTION: Performed by: NURSE ANESTHETIST, CERTIFIED REGISTERED

## 2020-08-19 PROCEDURE — 88300 SURGICAL PATH GROSS: CPT | Performed by: SURGERY

## 2020-08-19 PROCEDURE — 25010000002 PROPOFOL 10 MG/ML EMULSION: Performed by: NURSE ANESTHETIST, CERTIFIED REGISTERED

## 2020-08-19 PROCEDURE — 25010000002 MIDAZOLAM PER 1 MG: Performed by: NURSE ANESTHETIST, CERTIFIED REGISTERED

## 2020-08-19 PROCEDURE — 20205 DEEP MUSCLE BIOPSY: CPT | Performed by: SURGERY

## 2020-08-19 RX ORDER — LIDOCAINE HYDROCHLORIDE 20 MG/ML
INJECTION, SOLUTION INFILTRATION; PERINEURAL AS NEEDED
Status: DISCONTINUED | OUTPATIENT
Start: 2020-08-19 | End: 2020-08-19 | Stop reason: SURG

## 2020-08-19 RX ORDER — OXYCODONE HCL 10 MG/1
10 TABLET, FILM COATED, EXTENDED RELEASE ORAL ONCE
Status: COMPLETED | OUTPATIENT
Start: 2020-08-19 | End: 2020-08-19

## 2020-08-19 RX ORDER — SODIUM CHLORIDE 0.9 % (FLUSH) 0.9 %
3 SYRINGE (ML) INJECTION EVERY 12 HOURS SCHEDULED
Status: DISCONTINUED | OUTPATIENT
Start: 2020-08-19 | End: 2020-08-19 | Stop reason: HOSPADM

## 2020-08-19 RX ORDER — PROMETHAZINE HYDROCHLORIDE 25 MG/1
25 SUPPOSITORY RECTAL ONCE AS NEEDED
Status: DISCONTINUED | OUTPATIENT
Start: 2020-08-19 | End: 2020-08-19 | Stop reason: HOSPADM

## 2020-08-19 RX ORDER — MIDAZOLAM HYDROCHLORIDE 1 MG/ML
1 INJECTION INTRAMUSCULAR; INTRAVENOUS
Status: DISCONTINUED | OUTPATIENT
Start: 2020-08-19 | End: 2020-08-19 | Stop reason: HOSPADM

## 2020-08-19 RX ORDER — FENTANYL CITRATE 50 UG/ML
INJECTION, SOLUTION INTRAMUSCULAR; INTRAVENOUS AS NEEDED
Status: DISCONTINUED | OUTPATIENT
Start: 2020-08-19 | End: 2020-08-19 | Stop reason: SURG

## 2020-08-19 RX ORDER — EPHEDRINE SULFATE 50 MG/ML
5 INJECTION, SOLUTION INTRAVENOUS ONCE AS NEEDED
Status: DISCONTINUED | OUTPATIENT
Start: 2020-08-19 | End: 2020-08-19 | Stop reason: HOSPADM

## 2020-08-19 RX ORDER — ONDANSETRON 4 MG/1
4 TABLET, FILM COATED ORAL EVERY 8 HOURS PRN
Qty: 20 TABLET | Refills: 0 | Status: SHIPPED | OUTPATIENT
Start: 2020-08-19 | End: 2022-02-16

## 2020-08-19 RX ORDER — PROMETHAZINE HYDROCHLORIDE 12.5 MG/1
25 TABLET ORAL ONCE AS NEEDED
Status: DISCONTINUED | OUTPATIENT
Start: 2020-08-19 | End: 2020-08-19 | Stop reason: HOSPADM

## 2020-08-19 RX ORDER — LIDOCAINE HYDROCHLORIDE 10 MG/ML
0.5 INJECTION, SOLUTION EPIDURAL; INFILTRATION; INTRACAUDAL; PERINEURAL ONCE AS NEEDED
Status: DISCONTINUED | OUTPATIENT
Start: 2020-08-19 | End: 2020-08-19 | Stop reason: HOSPADM

## 2020-08-19 RX ORDER — OXYCODONE AND ACETAMINOPHEN 7.5; 325 MG/1; MG/1
1 TABLET ORAL ONCE AS NEEDED
Status: DISCONTINUED | OUTPATIENT
Start: 2020-08-19 | End: 2020-08-19 | Stop reason: HOSPADM

## 2020-08-19 RX ORDER — SODIUM CHLORIDE, SODIUM LACTATE, POTASSIUM CHLORIDE, CALCIUM CHLORIDE 600; 310; 30; 20 MG/100ML; MG/100ML; MG/100ML; MG/100ML
9 INJECTION, SOLUTION INTRAVENOUS CONTINUOUS
Status: DISCONTINUED | OUTPATIENT
Start: 2020-08-19 | End: 2020-08-19 | Stop reason: HOSPADM

## 2020-08-19 RX ORDER — FAMOTIDINE 10 MG/ML
20 INJECTION, SOLUTION INTRAVENOUS ONCE
Status: COMPLETED | OUTPATIENT
Start: 2020-08-19 | End: 2020-08-19

## 2020-08-19 RX ORDER — HYDROCODONE BITARTRATE AND ACETAMINOPHEN 7.5; 325 MG/1; MG/1
1 TABLET ORAL ONCE AS NEEDED
Status: DISCONTINUED | OUTPATIENT
Start: 2020-08-19 | End: 2020-08-19 | Stop reason: HOSPADM

## 2020-08-19 RX ORDER — ACETAMINOPHEN 500 MG
1000 TABLET ORAL ONCE
Status: COMPLETED | OUTPATIENT
Start: 2020-08-19 | End: 2020-08-19

## 2020-08-19 RX ORDER — SODIUM CHLORIDE 0.9 % (FLUSH) 0.9 %
10 SYRINGE (ML) INJECTION AS NEEDED
Status: DISCONTINUED | OUTPATIENT
Start: 2020-08-19 | End: 2020-08-19 | Stop reason: HOSPADM

## 2020-08-19 RX ORDER — FENTANYL CITRATE 50 UG/ML
50 INJECTION, SOLUTION INTRAMUSCULAR; INTRAVENOUS
Status: DISCONTINUED | OUTPATIENT
Start: 2020-08-19 | End: 2020-08-19 | Stop reason: HOSPADM

## 2020-08-19 RX ORDER — PROMETHAZINE HYDROCHLORIDE 25 MG/ML
12.5 INJECTION, SOLUTION INTRAMUSCULAR; INTRAVENOUS ONCE AS NEEDED
Status: DISCONTINUED | OUTPATIENT
Start: 2020-08-19 | End: 2020-08-19 | Stop reason: HOSPADM

## 2020-08-19 RX ORDER — BUPIVACAINE HYDROCHLORIDE AND EPINEPHRINE 5; 5 MG/ML; UG/ML
INJECTION, SOLUTION PERINEURAL AS NEEDED
Status: DISCONTINUED | OUTPATIENT
Start: 2020-08-19 | End: 2020-08-19 | Stop reason: HOSPADM

## 2020-08-19 RX ORDER — HYDRALAZINE HYDROCHLORIDE 20 MG/ML
5 INJECTION INTRAMUSCULAR; INTRAVENOUS
Status: DISCONTINUED | OUTPATIENT
Start: 2020-08-19 | End: 2020-08-19 | Stop reason: HOSPADM

## 2020-08-19 RX ORDER — FLUMAZENIL 0.1 MG/ML
0.2 INJECTION INTRAVENOUS AS NEEDED
Status: DISCONTINUED | OUTPATIENT
Start: 2020-08-19 | End: 2020-08-19 | Stop reason: HOSPADM

## 2020-08-19 RX ORDER — MIDAZOLAM HYDROCHLORIDE 1 MG/ML
INJECTION INTRAMUSCULAR; INTRAVENOUS AS NEEDED
Status: DISCONTINUED | OUTPATIENT
Start: 2020-08-19 | End: 2020-08-19 | Stop reason: SURG

## 2020-08-19 RX ORDER — MAGNESIUM HYDROXIDE 1200 MG/15ML
LIQUID ORAL AS NEEDED
Status: DISCONTINUED | OUTPATIENT
Start: 2020-08-19 | End: 2020-08-19 | Stop reason: HOSPADM

## 2020-08-19 RX ORDER — NALOXONE HCL 0.4 MG/ML
0.2 VIAL (ML) INJECTION AS NEEDED
Status: DISCONTINUED | OUTPATIENT
Start: 2020-08-19 | End: 2020-08-19 | Stop reason: HOSPADM

## 2020-08-19 RX ORDER — GLYCOPYRROLATE 0.2 MG/ML
INJECTION INTRAMUSCULAR; INTRAVENOUS AS NEEDED
Status: DISCONTINUED | OUTPATIENT
Start: 2020-08-19 | End: 2020-08-19 | Stop reason: SURG

## 2020-08-19 RX ORDER — HYDROMORPHONE HYDROCHLORIDE 1 MG/ML
0.5 INJECTION, SOLUTION INTRAMUSCULAR; INTRAVENOUS; SUBCUTANEOUS
Status: DISCONTINUED | OUTPATIENT
Start: 2020-08-19 | End: 2020-08-19 | Stop reason: HOSPADM

## 2020-08-19 RX ORDER — SODIUM CHLORIDE 0.9 % (FLUSH) 0.9 %
3-10 SYRINGE (ML) INJECTION AS NEEDED
Status: DISCONTINUED | OUTPATIENT
Start: 2020-08-19 | End: 2020-08-19 | Stop reason: HOSPADM

## 2020-08-19 RX ORDER — PROPOFOL 10 MG/ML
VIAL (ML) INTRAVENOUS AS NEEDED
Status: DISCONTINUED | OUTPATIENT
Start: 2020-08-19 | End: 2020-08-19 | Stop reason: SURG

## 2020-08-19 RX ORDER — PROMETHAZINE HYDROCHLORIDE 25 MG/ML
6.25 INJECTION, SOLUTION INTRAMUSCULAR; INTRAVENOUS
Status: DISCONTINUED | OUTPATIENT
Start: 2020-08-19 | End: 2020-08-19 | Stop reason: HOSPADM

## 2020-08-19 RX ORDER — ONDANSETRON 2 MG/ML
4 INJECTION INTRAMUSCULAR; INTRAVENOUS ONCE AS NEEDED
Status: DISCONTINUED | OUTPATIENT
Start: 2020-08-19 | End: 2020-08-19 | Stop reason: HOSPADM

## 2020-08-19 RX ORDER — DIPHENHYDRAMINE HCL 25 MG
25 CAPSULE ORAL
Status: DISCONTINUED | OUTPATIENT
Start: 2020-08-19 | End: 2020-08-19 | Stop reason: HOSPADM

## 2020-08-19 RX ORDER — LABETALOL HYDROCHLORIDE 5 MG/ML
5 INJECTION, SOLUTION INTRAVENOUS
Status: DISCONTINUED | OUTPATIENT
Start: 2020-08-19 | End: 2020-08-19 | Stop reason: HOSPADM

## 2020-08-19 RX ORDER — DIPHENHYDRAMINE HYDROCHLORIDE 50 MG/ML
12.5 INJECTION INTRAMUSCULAR; INTRAVENOUS
Status: DISCONTINUED | OUTPATIENT
Start: 2020-08-19 | End: 2020-08-19 | Stop reason: HOSPADM

## 2020-08-19 RX ADMIN — SODIUM CHLORIDE, POTASSIUM CHLORIDE, SODIUM LACTATE AND CALCIUM CHLORIDE 9 ML/HR: 600; 310; 30; 20 INJECTION, SOLUTION INTRAVENOUS at 09:53

## 2020-08-19 RX ADMIN — FENTANYL CITRATE 50 MCG: 50 INJECTION INTRAMUSCULAR; INTRAVENOUS at 12:14

## 2020-08-19 RX ADMIN — MIDAZOLAM 2 MG: 1 INJECTION INTRAMUSCULAR; INTRAVENOUS at 12:12

## 2020-08-19 RX ADMIN — OXYCODONE HYDROCHLORIDE 10 MG: 10 TABLET, FILM COATED, EXTENDED RELEASE ORAL at 09:53

## 2020-08-19 RX ADMIN — GLYCOPYRROLATE 0.1 MG: 0.2 INJECTION INTRAMUSCULAR; INTRAVENOUS at 12:11

## 2020-08-19 RX ADMIN — ACETAMINOPHEN 1000 MG: 500 TABLET, FILM COATED ORAL at 09:53

## 2020-08-19 RX ADMIN — PROPOFOL 40 MG: 10 INJECTION, EMULSION INTRAVENOUS at 12:14

## 2020-08-19 RX ADMIN — PROPOFOL 100 MCG/KG/MIN: 10 INJECTION, EMULSION INTRAVENOUS at 12:14

## 2020-08-19 RX ADMIN — LIDOCAINE HYDROCHLORIDE 80 MG: 20 INJECTION, SOLUTION INFILTRATION; PERINEURAL at 12:14

## 2020-08-19 RX ADMIN — FAMOTIDINE 20 MG: 10 INJECTION INTRAVENOUS at 09:55

## 2020-08-19 RX ADMIN — FENTANYL CITRATE 25 MCG: 50 INJECTION INTRAMUSCULAR; INTRAVENOUS at 12:27

## 2020-08-19 RX ADMIN — FENTANYL CITRATE 25 MCG: 50 INJECTION INTRAMUSCULAR; INTRAVENOUS at 12:24

## 2020-08-19 NOTE — ANESTHESIA PREPROCEDURE EVALUATION
Anesthesia Evaluation     Patient summary reviewed and Nursing notes reviewed   history of anesthetic complications: PONV  NPO Solid Status: > 8 hours  NPO Liquid Status: > 2 hours           Airway   Mallampati: II  TM distance: >3 FB  Neck ROM: full  no difficulty expected  Dental - normal exam     Pulmonary - normal exam   (+) sleep apnea on CPAP,   (-) COPD, asthma, not a smoker, lung cancer  Cardiovascular - normal exam  Exercise tolerance: good (4-7 METS)    ECG reviewed  Patient on routine beta blocker and Beta blocker given within 24 hours of surgery  Rhythm: regular  Rate: normal    (+) hypertension well controlled 2 medications or greater, hyperlipidemia,   (-) valvular problems/murmurs, past MI, CAD, dysrhythmias, angina, CHF, cardiac stents, CABG, pericardial effusion      Neuro/Psych  (+) CVA residual symptoms, headaches,     (-) seizures, TIA  GI/Hepatic/Renal/Endo - negative ROS   (-) hiatal hernia, GERD, PUD, hepatitis, liver disease, no renal disease, diabetes, GI bleed, no thyroid disorder    Musculoskeletal (-) negative ROS    Abdominal  - normal exam   Substance History - negative use     OB/GYN negative ob/gyn ROS         Other - negative ROS                       Anesthesia Plan    ASA 3     general     intravenous induction     Anesthetic plan, all risks, benefits, and alternatives have been provided, discussed and informed consent has been obtained with: patient.    Plan discussed with CRNA and attending.       oral

## 2020-08-19 NOTE — OP NOTE
SURGERY  Operative Note :  MAICO Nolasco ALEIDA Josephy  1970    Procedure Date: 08/19/20    Pre-op Diagnosis:  · Rule out myositis, with elevated CK    Post-op Diagnosis:  · Same    Procedure:   · Left quadriceps muscle biopsy    Surgeon: Maico    Assistant: None    Indications:  · CK of approximately 1100    Associated Issues:  · Prior stroke with right sided insensitivity resulting in a somewhat dysfunctional gait    Findings:   · Normal appearance of muscle    Recommendations:   · Routine recovery    Specimens:   · Left quadricep muscle    EBL: Less than 5 cc    Technique:     MAC anesthesia was induced, area prepped with ChloraPrep, draped sterilely.  Transverse incision was made after anesthetizing with a percent Marcaine with epinephrine.  Tissue was distracted and the subcutaneous tissue was opened transversely with the cautery.  The muscular fascia was then opened vertically.  I dissected under the fascia and then dissected out a core of muscle tissue that was about 2 x 2 cm.  I clamped either side of that and divided it with scissors and sent that for path.  I then sutured the muscle edges with 3-0 Vicryl.  I then closed the fascia with a running 3-0 Vicryl, subcutaneous with interrupted 3-0 Vicryl, dermis with interrupted 3-0 Vicryl, skin with running 4-0 Vicryl.  Dermabond    Paulina Nina MD  08/19/20

## 2020-08-19 NOTE — ANESTHESIA POSTPROCEDURE EVALUATION
Patient: Gaurav Mora    Procedure Summary     Date:  08/19/20 Room / Location:  [facility] OR 06 / [facility] MAIN OR    Anesthesia Start:  1209 Anesthesia Stop:  1305    Procedure:  quadriceps muscle biopsy (Left Thigh) Diagnosis:       Elevated CK      (Elevated CK [R74.8])    Surgeon:  Paulina Nina MD Provider:  Dusty Salas MD    Anesthesia Type:  general ASA Status:  3          Anesthesia Type: general    Vitals  Vitals Value Taken Time   /77 8/19/2020  1:55 PM   Temp 36.9 °C (98.4 °F) 8/19/2020  1:04 PM   Pulse 70 8/19/2020  1:55 PM   Resp 16 8/19/2020  1:55 PM   SpO2 98 % 8/19/2020  1:59 PM   Vitals shown include unvalidated device data.        Post Anesthesia Care and Evaluation      Comments: Pt. Discharged prior to being evaluated by anesthesia.  Chart is reviewed and no complications are noted.  THIS CASE IS NOT MEDICALLY DIRECTED

## 2020-08-21 LAB
CYTO UR: NORMAL
LAB AP CASE REPORT: NORMAL
PATH REPORT.FINAL DX SPEC: NORMAL
PATH REPORT.GROSS SPEC: NORMAL

## 2020-08-31 ENCOUNTER — TELEPHONE (OUTPATIENT)
Dept: SURGERY | Facility: CLINIC | Age: 50
End: 2020-08-31

## 2020-08-31 NOTE — TELEPHONE ENCOUNTER
----- Message from Paulina Nina MD sent at 8/31/2020  9:59 AM EDT -----  Maribel (surgery office liaison),    Please call Dr Laura Garner's (Rheumatology) office and make sure they got this pathology.  Also, let the patient know that the pathology is back so it is appropriate for him to follow up with Dr Garner now.

## 2020-09-16 DIAGNOSIS — I10 ESSENTIAL HYPERTENSION: ICD-10-CM

## 2020-09-16 RX ORDER — SPIRONOLACTONE 25 MG/1
TABLET ORAL
Qty: 90 TABLET | Refills: 3 | Status: SHIPPED | OUTPATIENT
Start: 2020-09-16 | End: 2020-09-18 | Stop reason: SDUPTHER

## 2020-09-16 RX ORDER — LISINOPRIL 20 MG/1
TABLET ORAL
Qty: 90 TABLET | Refills: 3 | Status: SHIPPED | OUTPATIENT
Start: 2020-09-16 | End: 2020-09-18 | Stop reason: SDUPTHER

## 2020-09-17 ENCOUNTER — TELEPHONE (OUTPATIENT)
Dept: FAMILY MEDICINE CLINIC | Facility: CLINIC | Age: 50
End: 2020-09-17

## 2020-09-18 ENCOUNTER — TELEPHONE (OUTPATIENT)
Dept: FAMILY MEDICINE CLINIC | Facility: CLINIC | Age: 50
End: 2020-09-18

## 2020-09-18 DIAGNOSIS — I10 ESSENTIAL HYPERTENSION: ICD-10-CM

## 2020-09-18 RX ORDER — LISINOPRIL 20 MG/1
20 TABLET ORAL DAILY
Qty: 7 TABLET | Refills: 0 | Status: SHIPPED | OUTPATIENT
Start: 2020-09-18 | End: 2021-09-13

## 2020-09-18 RX ORDER — SPIRONOLACTONE 25 MG/1
25 TABLET ORAL DAILY
Qty: 7 TABLET | Refills: 0 | Status: SHIPPED | OUTPATIENT
Start: 2020-09-18 | End: 2021-09-13

## 2020-09-18 NOTE — TELEPHONE ENCOUNTER
[facility] called asking if you could send in like a 7 day supply of Gaurav's lisinopril and spironolactone. He says he asked you the other day to do this but it was sent in via [facility] and he talked to them and they said they won't be sending the medicine until Tuesday but they need it before then because they're going out of town. Says he will be out of those meds come the 28th. But just needs 7days or so worth to hold him over while they're out of town.

## 2020-09-28 PROBLEM — R33.9 URINARY RETENTION: Status: ACTIVE | Noted: 2017-12-06

## 2020-09-28 PROBLEM — R13.12 OROPHARYNGEAL DYSPHAGIA: Status: ACTIVE | Noted: 2017-12-06

## 2020-09-28 PROBLEM — I61.5 IVH (INTRAVENTRICULAR HEMORRHAGE): Status: ACTIVE | Noted: 2017-11-24

## 2020-09-28 PROBLEM — R47.01 APHASIA: Status: ACTIVE | Noted: 2018-06-12

## 2020-09-28 PROBLEM — I61.9 ICH (INTRACEREBRAL HEMORRHAGE) (HCC): Status: ACTIVE | Noted: 2017-11-24

## 2020-09-28 PROBLEM — N39.0 E. COLI UTI (URINARY TRACT INFECTION): Status: ACTIVE | Noted: 2017-12-20

## 2020-09-28 PROBLEM — G47.30 SLEEP-RELATED BREATHING DISORDER: Status: ACTIVE | Noted: 2017-12-06

## 2020-09-28 PROBLEM — E66.01 MORBID OBESITY WITH BMI OF 45.0-49.9, ADULT (HCC): Status: ACTIVE | Noted: 2017-12-06

## 2020-09-28 PROBLEM — N17.9 ACUTE KIDNEY INJURY: Status: ACTIVE | Noted: 2017-11-27

## 2020-09-28 PROBLEM — J69.0 ASPIRATION PNEUMONIA: Status: ACTIVE | Noted: 2017-11-27

## 2020-09-28 PROBLEM — G93.40 ACUTE ENCEPHALOPATHY: Status: ACTIVE | Noted: 2017-11-27

## 2020-09-28 PROBLEM — B96.20 E. COLI UTI (URINARY TRACT INFECTION): Status: ACTIVE | Noted: 2017-12-20

## 2020-09-28 PROBLEM — H53.461 RIGHT HOMONYMOUS HEMIANOPSIA: Status: ACTIVE | Noted: 2018-04-06

## 2020-10-01 ENCOUNTER — FLU SHOT (OUTPATIENT)
Dept: FAMILY MEDICINE CLINIC | Facility: CLINIC | Age: 50
End: 2020-10-01

## 2020-10-01 DIAGNOSIS — Z23 NEED FOR IMMUNIZATION AGAINST INFLUENZA: Primary | ICD-10-CM

## 2020-10-01 PROCEDURE — 90686 IIV4 VACC NO PRSV 0.5 ML IM: CPT | Performed by: PHYSICIAN ASSISTANT

## 2020-10-01 PROCEDURE — G0008 ADMIN INFLUENZA VIRUS VAC: HCPCS | Performed by: PHYSICIAN ASSISTANT

## 2020-10-07 DIAGNOSIS — I10 ESSENTIAL HYPERTENSION: ICD-10-CM

## 2020-10-08 RX ORDER — AMLODIPINE BESYLATE 10 MG/1
TABLET ORAL
Qty: 90 TABLET | Refills: 3 | Status: SHIPPED | OUTPATIENT
Start: 2020-10-08 | End: 2021-10-04

## 2020-10-26 RX ORDER — LABETALOL 100 MG/1
TABLET, FILM COATED ORAL
Qty: 270 TABLET | Refills: 3 | Status: SHIPPED | OUTPATIENT
Start: 2020-10-26 | End: 2021-10-04

## 2020-11-20 ENCOUNTER — TELEPHONE (OUTPATIENT)
Dept: SLEEP MEDICINE | Facility: HOSPITAL | Age: 50
End: 2020-11-20

## 2020-11-20 ENCOUNTER — DOCUMENTATION (OUTPATIENT)
Dept: SLEEP MEDICINE | Facility: HOSPITAL | Age: 50
End: 2020-11-20

## 2020-11-20 NOTE — TELEPHONE ENCOUNTER
----- Message from Terence Morris sent at 11/19/2020  5:02 PM EST -----  [facility] 05/14/21 at 1315  ----- Message -----  From: Aysha Gutierrez APRN  Sent: 11/19/2020   4:49 PM EST  To: Terence Morris Rep    Please schedule f/u in 6 months with Dr Garcia with smart card

## 2020-11-20 NOTE — PROGRESS NOTES
Gaurav Mora  Documented: 2020 4:43 PM  Location: [facility]  Patient #: [id number]  : 1970  Single / Language: Other / Race: White  Male        Assessment & Plan (Aysha FRANCES; 2020 4:46 PM)    SHANE (obstructive sleep apnea) (G47.33)  Impression: Annual f/u for shane. Patient is doing great on CPAP. His machine is set on auto CPAP 8-12cm. He has no snoring or gasping respirations. Pressures appear comfortable. No leak. He changed DME to [facility]. Currently does not need any supplies. I have no access to his data from the machine remotely. He will f/u with us after covid outbreak is over-at the sleep lab-plan on seeing him in 6 months.        Signed by JUAN DANIEL Yeboah (2020 4:47 PM)

## 2021-03-10 ENCOUNTER — HOSPITAL ENCOUNTER (OUTPATIENT)
Dept: CT IMAGING | Facility: HOSPITAL | Age: 51
Discharge: HOME | End: 2021-03-10
Admitting: THORACIC SURGERY (CARDIOTHORACIC VASCULAR SURGERY)

## 2021-03-10 DIAGNOSIS — R91.1 LUNG NODULE: ICD-10-CM

## 2021-03-10 PROCEDURE — 71250 CT THORAX DX C-: CPT

## 2021-03-11 ENCOUNTER — TELEPHONE (OUTPATIENT)
Dept: FAMILY MEDICINE CLINIC | Facility: CLINIC | Age: 51
End: 2021-03-11

## 2021-03-16 ENCOUNTER — OFFICE VISIT (OUTPATIENT)
Dept: CARDIOLOGY | Facility: CLINIC | Age: 51
End: 2021-03-16

## 2021-03-16 VITALS
OXYGEN SATURATION: 99 % | WEIGHT: 242 LBS | HEIGHT: 69 IN | HEART RATE: 64 BPM | BODY MASS INDEX: 35.84 KG/M2 | DIASTOLIC BLOOD PRESSURE: 70 MMHG | SYSTOLIC BLOOD PRESSURE: 110 MMHG | RESPIRATION RATE: 18 BRPM

## 2021-03-16 DIAGNOSIS — I61.9 HEMORRHAGIC CEREBROVASCULAR ACCIDENT (CVA) (HCC): ICD-10-CM

## 2021-03-16 DIAGNOSIS — E78.5 HYPERLIPIDEMIA, UNSPECIFIED HYPERLIPIDEMIA TYPE: ICD-10-CM

## 2021-03-16 DIAGNOSIS — I77.810 ASCENDING AORTA DILATATION (HCC): ICD-10-CM

## 2021-03-16 DIAGNOSIS — R74.8 ELEVATED CPK: ICD-10-CM

## 2021-03-16 DIAGNOSIS — I10 ESSENTIAL HYPERTENSION: Primary | ICD-10-CM

## 2021-03-16 PROCEDURE — 99214 OFFICE O/P EST MOD 30 MIN: CPT | Performed by: INTERNAL MEDICINE

## 2021-03-17 NOTE — PROGRESS NOTES
Date of Office Visit:  3/16/2021  Encounter Provider: Jeremiah Bradford MD  Place of Service: [facility] CARDIOLOGY  Patient Name: Gaurav Mora  :1970    Chief complaint: Follow-up for hypertension, prior hemorrhagic CVA, elevated   CPK level, hyperlipidemia, and ascending aorta dilatation.    History of Present Illness:    I had the pleasure of seeing the patient in cardiology office on 3/16/2021.  He is a   very pleasant 50 year-old white male with a history of hypertension, obstructive   sleep apnea, and hemorrhagic CVA who presents for follow-up. The patient's   father is with him again today and assists significantly in his care.  I initially saw   him in the office on 2018.     The patient was visiting in [place], [place], for Thanksgiving in . He experienced   right sided neurological symptoms and visual disturbances on 2017. He was   brought to the hospital at that time where he was found to have an extensive   hemorrhagic CVA.  His blood pressure upon presentation was 242/130.  He was   unaware that he had hypertension prior to this, and it was felt that the hypertension   caused the brain hemorrhage.  At that time, he was been placed on amlodipine,   labetalol, lisinopril, and spironolactone with good results.  He still does have   complete loss of sensation on the right side (although he still has good strength in   the right side), as well as loss of peripheral vision.  He did have an echocardiogram   in [place] the time of his stroke on 2017 which showed an ejection fraction of   60%, and a very mildly dilated proximal aorta at 3.6 cm.  He did have a subsequent   CT scan of his chest on 2019 which showed mild enlargement of the ascending   aorta at 3.8 cm.  He also was found to have a significantly elevated CK level of 1044   on 2019.  He was taken off of Crestor at that time, and the level did decrease;  however, it did remain  chronically elevated afterwards.  He actually had a muscle   biopsy by Dr. Jessie Garner in rheumatology which did not show any evidence   of muscle breakdown.    The patient presents today for follow-up.  He is again accompanied by his father.    The patient has been doing excellent, and has no complaints today.  He has had   no chest pain, shortness of breath, or other symptoms.  His blood pressure is   excellent 110/70.  He did tell me that Praluent was denied previously by his   insurance company.      Past Medical History:   Diagnosis Date   • Elevated CK    • Environmental allergies    • Headache, tension-type    • Hemorrhagic stroke (CMS/HCC) 11/24/2017    Residual peripheral vision loss, right-sided sensation loss   • History of bladder infections 12/2017   • Hyperlipidemia    • Hypertension    • Lung nodule    • SHANE on CPAP    • PONV (postoperative nausea and vomiting)    • Stroke (CMS/HCC) 2017       Past Surgical History:   Procedure Laterality Date   • APPENDECTOMY N/A 1987   • MUSCLE BIOPSY Left 8/19/2020    Procedure: quadriceps muscle biopsy;  Surgeon: Paulina Nina MD;  Location: [facility];  Service: General;  Laterality: Left;       Current Outpatient Medications on File Prior to Visit   Medication Sig Dispense Refill   • acetaminophen (TYLENOL) 325 MG tablet Take 650 mg by mouth Every 4 (Four) Hours As Needed for Mild Pain .     • amLODIPine (NORVASC) 10 MG tablet TAKE 1 TABLET DAILY 90 tablet 3   • aspirin (Aspirin Adult Low Dose) 81 MG EC tablet Take 1 tablet by mouth.     • Azelastine-Fluticasone 137-50 MCG/ACT suspension 2 sprays into the nostril(s) as directed by provider Daily.     • clotrimazole (Lotrimin AF) 1 % cream Apply  topically to the appropriate area as directed 2 (Two) Times a Day. 28 g 1   • fluticasone (FLONASE) 50 MCG/ACT nasal spray 2 sprays into the nostril(s) as directed by provider Daily.     • labetalol (NORMODYNE) 100 MG tablet TAKE 1 TABLET THREE TIMES A DAY FOR  HIGH BLOOD PRESSURE DISORDER (MUST SCHEDULE APPOINTMENT FOR MORE REFILLS) 270 tablet 3   • lisinopril (PRINIVIL,ZESTRIL) 20 MG tablet Take 1 tablet by mouth Daily. 7 tablet 0   • loratadine (Loradamed) 10 MG tablet Take 1 tablet by mouth.     • miconazole (Lotrimin AF) 2 % powder Apply  topically to the appropriate area as directed 2 (two) times a day. 71 g 1   • omeprazole (priLOSEC) 20 MG capsule Take 20 mg by mouth Daily.     • ondansetron (Zofran) 4 MG tablet Take 1 tablet by mouth Every 8 (Eight) Hours As Needed for Nausea or Vomiting for up to 20 doses. 20 tablet 0   • spironolactone (ALDACTONE) 25 MG tablet Take 1 tablet by mouth Daily. 7 tablet 0   • traMADol-acetaminophen (Ultracet) 37.5-325 MG per tablet 1-2 tablets every 6 hours as needed 10 tablet 0   • vitamin D (ERGOCALCIFEROL) 1.25 MG (31709 UT) capsule capsule Take 1 capsule by mouth Every 7 (Seven) Days. 4 capsule 6     No current facility-administered medications on file prior to visit.     Allergies as of 03/16/2021   • (No Known Allergies)     Social History     Socioeconomic History   • Marital status: Single     Spouse name: Not on file   • Number of children: 0   • Years of education: Not on file   • Highest education level: Not on file   Tobacco Use   • Smoking status: Never Smoker   • Smokeless tobacco: Never Used   • Tobacco comment: minimal caffeine   Substance and Sexual Activity   • Alcohol use: Yes     Comment: rarely   • Drug use: No   • Sexual activity: Defer     Family History   Problem Relation Age of Onset   • Breast cancer Mother    • Atrial fibrillation Father    • Hypertension Father    • Skin cancer Father    • Colon cancer Paternal Grandmother    • Heart disease Maternal Grandfather    • Heart disease Maternal Aunt    • Heart disease Maternal Uncle    • Malig Hyperthermia Neg Hx        Review of Systems   All other systems reviewed and are negative.     Objective:     Vitals:    03/16/21 1353   BP: 110/70   Pulse: 64   Resp:  "18   SpO2: 99%   Weight: 110 kg (242 lb)   Height: 175.3 cm (69\")     Body mass index is 35.74 kg/m².    Physical Exam  Constitutional:       Appearance: He is well-developed.   HENT:      Head: Normocephalic and atraumatic.   Eyes:      Conjunctiva/sclera: Conjunctivae normal.   Cardiovascular:      Rate and Rhythm: Normal rate and regular rhythm.      Heart sounds: No murmur. No friction rub. No gallop.    Pulmonary:      Effort: Pulmonary effort is normal.      Breath sounds: Normal breath sounds.   Abdominal:      Palpations: Abdomen is soft.      Tenderness: There is no abdominal tenderness.   Musculoskeletal:      Cervical back: Neck supple.   Skin:     General: Skin is warm.   Neurological:      Mental Status: He is alert and oriented to person, place, and time.   Psychiatric:         Behavior: Behavior normal.       Lab Review:   Procedures    Cardiac Procedures:  1.  Echocardiogram on 11/27/2017 in [place], [place]: The ejection fraction was 60%. The   aortic root measured 3.8 cm.  The proximal aorta was 3.6 cm.  2.  Noncontrast CT scan of the chest on 9/9/2019: There was a stable pulmonary nodule   left upper lobe.  There was hepatic steatosis.  There was mild enlargement of the   ascending aorta at 3.8 cm.    Assessment:       Diagnosis Plan   1. Essential hypertension     2. Hemorrhagic cerebrovascular accident (CVA) (CMS/HCC)     3. Elevated CPK     4. Hyperlipidemia, unspecified hyperlipidemia type     5. Ascending aorta dilatation (CMS/HCC)       Plan:       His blood pressure is under excellent control currently at 110/70.  He has not had any noted high readings recently.  He will continue on the amlodipine, labetalol, lisinopril, and spironolactone.  He has done very well on this regimen.  He is faithful about wearing his CPAP mask at night.  He should remain on the aspirin for life given his history of stroke.    With regards to the statin therapy, this is obviously ideal given his history of " stroke (even though this was a hemorrhagic stroke).  He has had grossly elevated LDL levels in the past of up to 192.  His CK level did decrease after stopping the Crestor in the past, but it remained significantly elevated.  He has had a muscle biopsy by Dr. Jessie Garner in rheumatology.  This evidently did not show any significant pathology per the patient and his father.  However, I still feel that statins are not indicated with this level of CK elevation (last at 789).  I did try for Praluent at his last visit, and this was declined by his insurance company.  I am going to try for this again.    He did have a CT scan without contrast on 3/10/2021 to reassess a lung nodule.  I pulled up the CT images in the room with the patient and his father.  I measured the aorta, and the maximum dimension I obtained was 3.8 cm.  This is consistent with his previous CT scan and assessment of his ascending aorta.  This is very mildly dilated.    I will see him back in the office in 6 months unless other issues arise.

## 2021-03-18 ENCOUNTER — OFFICE VISIT (OUTPATIENT)
Dept: SURGERY | Facility: CLINIC | Age: 51
End: 2021-03-18

## 2021-03-18 DIAGNOSIS — R91.1 LUNG NODULE: Primary | ICD-10-CM

## 2021-03-18 PROCEDURE — 99442 PR PHYS/QHP TELEPHONE EVALUATION 11-20 MIN: CPT | Performed by: NURSE PRACTITIONER

## 2021-03-18 NOTE — PROGRESS NOTES
Chief Complaint  Lung nodule, follow-up with CT chest  You have chosen to receive care through a telephone visit. Do you consent to use a telephone visit for your medical care today? Yes    Subjective          Gaurav Mora presents to [facility] for continued follow-up and surveillance of a lung nodule.    History of Present Illness  Mr. Mora presents for a telemedicine visit today.  His father is also available by phone.  He has been following with our service since May 2019 for an incidental CT scan finding of a pulmonary nodule.  He denies any pulmonary complaints today.  His father reports he has had some persistent throat clearing lately and has been seen by an allergist who is treating him with inhalers.  He does have chronic GERD and takes Prilosec for this.    Objective   Vital Signs:   There were no vitals taken for this visit.    Physical Exam   No vital signs assessed or physical examination performed secondary to telemedicine visit.    Result Review :   The following data was reviewed by: JUAN DANIEL Victor on 03/18/2021:    Data reviewed: Radiologic studies CT of the chest performed without contrast on 3/10/2021.  The left upper lobe pulmonary nodule measures 7 mm in size which is stable, but it is also developed calcification consistent with granuloma.  Another calcified granuloma is demonstrated in the right lung apex.  There is no lymphadenopathy.  There is a calcified left hilar lymph node.  No pleural effusion.  No acute bony abnormality.  Imaging of the upper abdomen is unremarkable.          Assessment and Plan    Diagnoses and all orders for this visit:    1. Lung nodule (Primary)    CT of the chest demonstrates stable size of the pulmonary nodule which has developed calcification.  This is consistent with granulomatous disease and a benign etiology which does not need continued surveillance.  Recommended the patient continue to follow-up with his  allergist or PCP concerning the throat clearing that has been problematic.  Of note, the patient is on a ACE inhibitor which could also be the culprit of his throat clearing/cough.  I did explain to the patient's father that there was nothing suggestive of any pulmonary source or respiratory infection.  Given the patient's stable CT with development of calcifications of his pulmonary nodules, we can assume a benign etiology and can follow the patient on as-needed basis.  You for allowing us to participate in the care of Gaurav Mora.    I spent 13 minutes caring for Gaurav on this date of service. This time includes time spent by me in the following activities:preparing for the visit, reviewing tests, obtaining and/or reviewing a separately obtained history, counseling and educating the patient/family/caregiver, ordering medications, tests, or procedures, referring and communicating with other health care professionals , documenting information in the medical record and independently interpreting results and communicating that information with the patient/family/caregiver  Follow Up   Return if symptoms worsen or fail to improve.  Patient was given instructions and counseling regarding his condition or for health maintenance advice. Please see specific information pulled into the AVS if appropriate.

## 2021-03-23 ENCOUNTER — OFFICE VISIT (OUTPATIENT)
Dept: NEUROLOGY | Facility: CLINIC | Age: 51
End: 2021-03-23

## 2021-03-23 VITALS
HEART RATE: 73 BPM | SYSTOLIC BLOOD PRESSURE: 120 MMHG | OXYGEN SATURATION: 98 % | DIASTOLIC BLOOD PRESSURE: 72 MMHG | HEIGHT: 69 IN | BODY MASS INDEX: 35.99 KG/M2 | WEIGHT: 243 LBS

## 2021-03-23 DIAGNOSIS — E78.5 HYPERLIPIDEMIA LDL GOAL <70: ICD-10-CM

## 2021-03-23 DIAGNOSIS — I69.359 HISTORY OF HEMORRHAGIC STROKE WITH RESIDUAL HEMIPARESIS (HCC): Primary | ICD-10-CM

## 2021-03-23 DIAGNOSIS — R74.8 ELEVATED CK: ICD-10-CM

## 2021-03-23 PROCEDURE — 99214 OFFICE O/P EST MOD 30 MIN: CPT | Performed by: PSYCHIATRY & NEUROLOGY

## 2021-03-23 RX ORDER — MONTELUKAST SODIUM 10 MG/1
TABLET ORAL
COMMUNITY
Start: 2021-03-13 | End: 2022-10-24

## 2021-03-23 NOTE — PROGRESS NOTES
CC: Prior left basal ganglia hemorrhagic stroke and hyper CK emia    HPI:  Gaurav Mora is a  50 y.o.  right-handed white male who I am seeing on yearly follow-up regarding hemorrhagic stroke left basal ganglia and hyper CK emia.  I saw him last 3/24/2020.  History is taken from that note with additions and adjustments as indicated:    He has history of stroke associated with extremely high blood pressure on 11/23/17.  He was in [place] when this occurred.  He was taken from the [facility] to a stroke center [facility] and saw Dr. Orona and subsequently saw Dr. Pan.  The patient had a left basal ganglia/temporal lobe hemorrhage with some intraventricular hemorrhage present.  He had an angiogram which did not show evidence of an AVM.  The anterior communicating artery was small and no posterior communicating arteries were identified.  The stroke caused a right hemiparesis, some numbness and right visual field cut.  He has not had recurrent TIA or stroke symptoms.  His blood pressure has been well controlled on amlodipine, labetalol and lisinopril.  He was treated with a statin but was found to have an elevated CK of around 1000.  The statin was stopped but even after a reasonable time past his CK was still 786.  Patient did not have clinical evidence of a myopathy such as proximal weakness.  I reviewed options with him such as EMG and muscle biopsy but I did not feel these would lead to a separate treatable diagnosis given the likelihood he had hyper CK emia.    The patient was subsequently referred to Dr. Jessie Garner, rheumatology 5/21/2020 and again 7/28/2020.  She obtained an MRI of the thigh which did not show myositis changes.  A muscle biopsy was obtained which was performed by Dr. Nina and reported in the [facility] records.  The study showed rather pronounced enlargement predominantly type I fibers with no evidence of inflammation.  Finding was nonspecific but could be seen in  neuropathic as well as myotonic dystrophy patients.  As noted above the patient did not have any findings to help differentiate the origin of the CK elevation and hence the muscle biopsy findings.    Since the patient has a significantly elevated CK made worse by statins he is not a candidate for further statin therapy.  The patient indicates Dr. Bradford had ordered Repatha which was denied and also [facility].  The patient's father indicates he just got a denial letter.  It is unclear to me a reason for denial given the patient's circumstance given that his LDL cholesterol runs 150-190 and that he has symptomatic cerebrovascular disease with history of hemorrhagic stroke    Past Medical History:   Diagnosis Date   • Elevated CK    • Environmental allergies    • Headache, tension-type    • Hemorrhagic stroke (CMS/HCC) 11/24/2017    Residual peripheral vision loss, right-sided sensation loss   • History of bladder infections 12/2017   • Hyperlipidemia    • Hypertension    • Lung nodule    • SHANE on CPAP    • PONV (postoperative nausea and vomiting)    • Stroke (CMS/HCC) 2017         Past Surgical History:   Procedure Laterality Date   • APPENDECTOMY N/A 1987   • MUSCLE BIOPSY Left 8/19/2020    Procedure: quadriceps muscle biopsy;  Surgeon: Paulina Nina MD;  Location: [facility];  Service: General;  Laterality: Left;           Current Outpatient Medications:   •  acetaminophen (TYLENOL) 325 MG tablet, Take 650 mg by mouth Every 4 (Four) Hours As Needed for Mild Pain ., Disp: , Rfl:   •  amLODIPine (NORVASC) 10 MG tablet, TAKE 1 TABLET DAILY, Disp: 90 tablet, Rfl: 3  •  aspirin (Aspirin Adult Low Dose) 81 MG EC tablet, Take 1 tablet by mouth., Disp: , Rfl:   •  Azelastine-Fluticasone 137-50 MCG/ACT suspension, 2 sprays into the nostril(s) as directed by provider Daily., Disp: , Rfl:   •  fluticasone (FLONASE) 50 MCG/ACT nasal spray, 2 sprays into the nostril(s) as directed by provider Daily.,  Disp: , Rfl:   •  labetalol (NORMODYNE) 100 MG tablet, TAKE 1 TABLET THREE TIMES A DAY FOR HIGH BLOOD PRESSURE DISORDER (MUST SCHEDULE APPOINTMENT FOR MORE REFILLS), Disp: 270 tablet, Rfl: 3  •  lisinopril (PRINIVIL,ZESTRIL) 20 MG tablet, Take 1 tablet by mouth Daily., Disp: 7 tablet, Rfl: 0  •  omeprazole (priLOSEC) 20 MG capsule, Take 20 mg by mouth Daily., Disp: , Rfl:   •  ondansetron (Zofran) 4 MG tablet, Take 1 tablet by mouth Every 8 (Eight) Hours As Needed for Nausea or Vomiting for up to 20 doses., Disp: 20 tablet, Rfl: 0  •  spironolactone (ALDACTONE) 25 MG tablet, Take 1 tablet by mouth Daily., Disp: 7 tablet, Rfl: 0  •  vitamin D (ERGOCALCIFEROL) 1.25 MG (35857 UT) capsule capsule, Take 1 capsule by mouth Every 7 (Seven) Days., Disp: 4 capsule, Rfl: 6  •  clotrimazole (Lotrimin AF) 1 % cream, Apply  topically to the appropriate area as directed 2 (Two) Times a Day., Disp: 28 g, Rfl: 1  •  Evolocumab (REPATHA) solution prefilled syringe injection, Inject 1 mL under the skin into the appropriate area as directed Every 14 (Fourteen) Days., Disp: 2 mL, Rfl: 11  •  loratadine (Loradamed) 10 MG tablet, Take 1 tablet by mouth., Disp: , Rfl:   •  miconazole (Lotrimin AF) 2 % powder, Apply  topically to the appropriate area as directed 2 (two) times a day., Disp: 71 g, Rfl: 1  •  montelukast (SINGULAIR) 10 MG tablet, , Disp: , Rfl:   •  traMADol-acetaminophen (Ultracet) 37.5-325 MG per tablet, 1-2 tablets every 6 hours as needed, Disp: 10 tablet, Rfl: 0      Family History   Problem Relation Age of Onset   • Breast cancer Mother    • Atrial fibrillation Father    • Hypertension Father    • Skin cancer Father    • Colon cancer Paternal Grandmother    • Heart disease Maternal Grandfather    • Parkinsonism Maternal Grandfather    • Heart disease Maternal Aunt    • Heart disease Maternal Uncle    • Malig Hyperthermia Neg Hx          Social History     Socioeconomic History   • Marital status: Single     Spouse name:  "Not on file   • Number of children: 0   • Years of education: Not on file   • Highest education level: Not on file   Tobacco Use   • Smoking status: Never Smoker   • Smokeless tobacco: Never Used   • Tobacco comment: minimal caffeine   Substance and Sexual Activity   • Alcohol use: Not Currently     Alcohol/week: 1.0 standard drinks     Types: 1 Cans of beer per week     Comment: This is rare.   • Drug use: Never   • Sexual activity: Never         No Known Allergies      Pain Scale: 0/10        ROS:  Review of Systems   Constitutional: Negative for activity change, appetite change and fatigue.   Eyes: Negative for pain, redness and itching.   Respiratory: Negative for cough, choking and shortness of breath.    Neurological: Positive for speech difficulty and numbness. Negative for dizziness, tremors, seizures, syncope, facial asymmetry, weakness, light-headedness and headaches.   Psychiatric/Behavioral: Negative for agitation, behavioral problems, confusion, decreased concentration, dysphoric mood, hallucinations, self-injury, sleep disturbance and suicidal ideas. The patient is not nervous/anxious and is not hyperactive.          I have reviewed and agree with the above ROS completed by the medical assistant.      Physical Exam:  Vitals:    03/23/21 1554   BP: 120/72   Pulse: 73   SpO2: 98%   Weight: 110 kg (243 lb)   Height: 175.3 cm (69\")     Orthostatic BP:    Body mass index is 35.88 kg/m².    Physical Exam  General:  HEENT:  Neck:  Heart:  Extremities:      Neurological Exam:   Mental Status: Awake, alert, oriented to person, place and time.  Conversant without evidence of an affective disorder, thought disorder, delusions or hallucinations.  Attention span and concentration are normal.  HCF: No aphasia, apraxia or dysarthria.  Recent and remote memory intact.  Knowledge  of recent events intact.  CN: I:   II: Visual fields show a right homonymous hemianopia   III, IV, VI: Eye movements intact without " nystagmus or ptosis.  Pupils equal  round and reactive to light.   V,VII: Light touch and pinprick intact all 3 divisions of V.  Facial muscles symmetrical.   VIII: Hearing intact to finger rub   IX,X: Soft palate elevates symmetrically   XI: Sternomastoid and trapezius are strong.   XII: Tongue midline without atrophy or fasciculations  Motor: Mildly increased tone on the righ w with normal bulk in the upper and lower extremities   Power testing: Mild weakness of right intrinsic hand muscles.  Specifically no proximal muscle weakness identified at all in the upper or lower extremities.  No percussion myotonia in either abductor pollicis brevis was seen.  Reflexes: Upper extremities: Mild asymmetry brisker on the right        Lower extremities:        Toe signs:  Sensory: Light touch:        Pinprick:        Vibration:        Position:    Cerebellar: Finger-to-nose: Intact           Rapid movement: Intact           Heel-to-shin:  Gait and Station: Mildly broad-based.  Mildly unsteady.  Right arm swings less.  There is mild pronator drift in the right arm on Romberg testing.    Results:      Lab Results   Component Value Date    GLUCOSE 95 08/17/2020    BUN 17 10/01/2020    CREATININE 1.00 10/01/2020    EGFRIFNONA 80 10/01/2020    EGFRIFAFRI 97 10/01/2020    BCR 17.0 10/01/2020    CO2 24.9 10/01/2020    CALCIUM 9.1 10/01/2020    PROTENTOTREF 6.6 10/01/2020    ALBUMIN 4.70 10/01/2020    LABIL2 2.5 10/01/2020    AST 28 10/01/2020    ALT 32 10/01/2020       Lab Results   Component Value Date    WBC 6.66 10/01/2020    HGB 14.4 10/01/2020    HCT 43.3 10/01/2020    MCV 80.0 10/01/2020     10/01/2020         .No results found for: RPR      Lab Results   Component Value Date    TSH 1.720 01/08/2019         No results found for: [id number]      No results found for: FOLATE      No results found for: HGBA1C      Lab Results   Component Value Date    GLUCOSE 95 08/17/2020    BUN 17 10/01/2020    CREATININE 1.00  10/01/2020    EGFRIFNONA 80 10/01/2020    EGFRIFAFRI 97 10/01/2020    BCR 17.0 10/01/2020    K 4.1 10/01/2020    CO2 24.9 10/01/2020    CALCIUM 9.1 10/01/2020    PROTENTOTREF 6.6 10/01/2020    ALBUMIN 4.70 10/01/2020    LABIL2 2.5 10/01/2020    AST 28 10/01/2020    ALT 32 10/01/2020         Lab Results   Component Value Date    WBC 6.66 10/01/2020    HGB 14.4 10/01/2020    HCT 43.3 10/01/2020    MCV 80.0 10/01/2020     10/01/2020             Assessment:  1.  Hemorrhagic stroke with 2 known risk factors of hypertension now well controlled and significant hyperlipidemia.  2.  Hyper CK emia with type I fiber hypertrophy on muscle biopsy-this association is unexpected.  Generally type I fiber hypertrophy is seen in neuropathic/myopathic overload conditions.  Polyneuropathy and motor neuron disease can demonstrate this as well as myotonic dystrophy however there are obvious additional pathologic findings seen on muscle biopsy in these conditions and the patient clearly has none of them clinically.            Plan:  1.  Continue risk factor control.  I certainly endorse use of Repatha for his substantially elevated LDL given his elevated CK made worse by statin drugs.  He is symptomatic given that he has had a hemorrhagic stroke.  On this recommendation I concur with Dr. Bradford.  2.  No further work-up needed regarding his elevated CK.  3.  Follow-up in 1 year            Time: 30 minutes            Dictated utilizing Dragon dictation.

## 2021-06-11 ENCOUNTER — APPOINTMENT (OUTPATIENT)
Dept: SLEEP MEDICINE | Facility: HOSPITAL | Age: 51
End: 2021-06-11

## 2021-07-02 ENCOUNTER — OFFICE VISIT (OUTPATIENT)
Dept: SLEEP MEDICINE | Facility: HOSPITAL | Age: 51
End: 2021-07-02

## 2021-07-02 VITALS
HEIGHT: 69 IN | BODY MASS INDEX: 37.03 KG/M2 | SYSTOLIC BLOOD PRESSURE: 118 MMHG | WEIGHT: 250 LBS | DIASTOLIC BLOOD PRESSURE: 82 MMHG | HEART RATE: 66 BPM | OXYGEN SATURATION: 96 %

## 2021-07-02 DIAGNOSIS — E66.9 OBESITY (BMI 30-39.9): ICD-10-CM

## 2021-07-02 DIAGNOSIS — G47.33 OBSTRUCTIVE SLEEP APNEA: Primary | ICD-10-CM

## 2021-07-02 PROCEDURE — G0463 HOSPITAL OUTPT CLINIC VISIT: HCPCS

## 2021-07-02 NOTE — PROGRESS NOTES
[facility] SLEEP MEDICINE  [address] [facility]  [address] [facility] [address]  [phone]    PCP: Rebecca Nixon PA    Reason for visit:  Sleep disorders: SHANE    Gaurav is a 51 y.o.male who was seen in the Sleep Disorders Center today. Annual fu. He is doing well with CPAP and benefits. He uses a ffm, fits well, no air leaks or dry mouth. Sleeps from 12mn to 9am. He wakes up rested and refreshed. No EDS.   [place] Sleepiness Scale is 4. Caffeine 1 per day. Alcohol 0 per week.    Gaurav  reports that he has never smoked. He has never used smokeless tobacco.    Pertinent Positive Review of Systems of nasal congestion  Rest of Review of Systems was negative as recorded in Sleep Questionnaire.    Patient  has a past medical history of Elevated CK, Environmental allergies, Headache, tension-type, Hemorrhagic stroke (CMS/HCC) (11/24/2017), History of bladder infections (12/2017), Hyperlipidemia, Hypertension, Lung nodule, SHANE on CPAP, PONV (postoperative nausea and vomiting), and Stroke (CMS/formerly [facility]) (2017).     Current Medications:    Current Outpatient Medications:   •  acetaminophen (TYLENOL) 325 MG tablet, Take 650 mg by mouth Every 4 (Four) Hours As Needed for Mild Pain ., Disp: , Rfl:   •  amLODIPine (NORVASC) 10 MG tablet, TAKE 1 TABLET DAILY, Disp: 90 tablet, Rfl: 3  •  aspirin (Aspirin Adult Low Dose) 81 MG EC tablet, Take 1 tablet by mouth., Disp: , Rfl:   •  Azelastine-Fluticasone 137-50 MCG/ACT suspension, 2 sprays into the nostril(s) as directed by provider Daily., Disp: , Rfl:   •  clotrimazole (Lotrimin AF) 1 % cream, Apply  topically to the appropriate area as directed 2 (Two) Times a Day., Disp: 28 g, Rfl: 1  •  Evolocumab (REPATHA) solution prefilled syringe injection, Inject 1 mL under the skin into the appropriate area as directed Every 14 (Fourteen) Days., Disp: 2 mL, Rfl: 11  •  fluticasone (FLONASE) 50 MCG/ACT nasal spray, 2 sprays into the nostril(s) as directed by provider Daily., Disp:  ", Rfl:   •  labetalol (NORMODYNE) 100 MG tablet, TAKE 1 TABLET THREE TIMES A DAY FOR HIGH BLOOD PRESSURE DISORDER (MUST SCHEDULE APPOINTMENT FOR MORE REFILLS), Disp: 270 tablet, Rfl: 3  •  lisinopril (PRINIVIL,ZESTRIL) 20 MG tablet, Take 1 tablet by mouth Daily., Disp: 7 tablet, Rfl: 0  •  loratadine (Loradamed) 10 MG tablet, Take 1 tablet by mouth., Disp: , Rfl:   •  miconazole (Lotrimin AF) 2 % powder, Apply  topically to the appropriate area as directed 2 (two) times a day., Disp: 71 g, Rfl: 1  •  montelukast (SINGULAIR) 10 MG tablet, , Disp: , Rfl:   •  omeprazole (priLOSEC) 20 MG capsule, Take 20 mg by mouth Daily., Disp: , Rfl:   •  ondansetron (Zofran) 4 MG tablet, Take 1 tablet by mouth Every 8 (Eight) Hours As Needed for Nausea or Vomiting for up to 20 doses., Disp: 20 tablet, Rfl: 0  •  spironolactone (ALDACTONE) 25 MG tablet, Take 1 tablet by mouth Daily., Disp: 7 tablet, Rfl: 0  •  traMADol-acetaminophen (Ultracet) 37.5-325 MG per tablet, 1-2 tablets every 6 hours as needed, Disp: 10 tablet, Rfl: 0  •  vitamin D (ERGOCALCIFEROL) 1.25 MG (45126 UT) capsule capsule, Take 1 capsule by mouth Every 7 (Seven) Days., Disp: 4 capsule, Rfl: 6   also entered in Sleep Questionnaire         Vital Signs: /82   Pulse 66   Ht 175.3 cm (69\")   Wt 113 kg (250 lb)   SpO2 96%   BMI 36.92 kg/m²     Body mass index is 36.92 kg/m².       Tongue: Large       Dentition: good       Pharynx: Posterior pharyngeal pillars are unable to see   Mallampatti: IV (only hard palate visible)        General: Alert. Cooperative. Well developed. No acute distress.             Head:  Normocephalic. Symmetrical. Atraumatic.              Nose: No septal deviation. No drainage.          Throat: No oral lesions. No thrush. Moist mucous membranes.    Chest Wall:  Normal shape. Symmetric expansion with respiration. No tenderness.             Neck:  Trachea midline.           Lungs:  Clear to auscultation bilaterally. No wheezes. No " rhonchi. No rales. Respirations regular, even and unlabored.            Heart:  Regular rhythm and normal rate. Normal S1 and S2. No murmur.     Abdomen:  Soft, non-tender and non-distended. Normal bowel sounds. No masses.  Extremities:  Moves all extremities well. No edema.    Psychiatric: Normal mood and affect.    Diagnostic data available to date is as below and was reviewed on current visit:  · Outside sleep study at [facility] sleep center in [facility], 3/19/2018  AHI index 14.3 titrated up to 11 cm water pressure    Most current available usage data reviewed on 07/02/2021:  · 100% compliance average 9 hours AHI 0.5 average pressure 9.3 auto CPAP between 8 and 12    DME Company: [employer]    No orders of the defined types were placed in this encounter.    Impression:  1. Obstructive sleep apnea    2. Obesity (BMI 30-39.9)        Plan:  Gaurav is fully compliant and benefits from the CPAP.  Wakes up rested and refreshed.  No change in pressures required.  Advised to continue with same.    I reiterated the importance of effective treatment of obstructive sleep apnea with PAP machine.  Cardiovascular health risks of untreated sleep apnea were again reviewed.  Patient was asked to remain cautious if there is persistent hypersomnolence. The benefit of weight loss in reducing severity of obstructive sleep apnea was discussed.  Patient would benefit from adhering to a strict diet to achieve ideal BMI.     Change of PAP supplies regularly is important for effective use.  Change of cushion on the mask or plugs on nasal pillows along with disposable filters once every month and change of mask frame, tubing, headgear and Velcro straps every 6 months at the minimum was reiterated.    This patient is compliant with PAP machine and benefits from its use.  Apnea hypopneas index is corrected/improved.  Daytime hypersomnolence has resolved.     Patient will follow up in this clinic in 1 year APRN    Thank you for allowing  me to participate in your patient's care.    Electronically signed by Freedom Garcia MD, 07/02/21, 1:01 PM EDT.    Part of this note may be an electronic transcription/translation of spoken language to printed text using the Dragon Dictation System.

## 2021-09-11 DIAGNOSIS — I10 ESSENTIAL HYPERTENSION: ICD-10-CM

## 2021-09-13 RX ORDER — SPIRONOLACTONE 25 MG/1
TABLET ORAL
Qty: 90 TABLET | Refills: 0 | Status: SHIPPED | OUTPATIENT
Start: 2021-09-13 | End: 2021-12-10

## 2021-09-13 RX ORDER — LISINOPRIL 20 MG/1
TABLET ORAL
Qty: 90 TABLET | Refills: 0 | Status: SHIPPED | OUTPATIENT
Start: 2021-09-13 | End: 2021-12-10

## 2021-09-18 DIAGNOSIS — E55.9 VITAMIN D DEFICIENCY: ICD-10-CM

## 2021-09-22 RX ORDER — ERGOCALCIFEROL 1.25 MG/1
50000 CAPSULE ORAL
Qty: 4 CAPSULE | Refills: 0 | Status: SHIPPED | OUTPATIENT
Start: 2021-09-22 | End: 2021-12-17 | Stop reason: SDUPTHER

## 2021-10-03 DIAGNOSIS — I10 ESSENTIAL HYPERTENSION: ICD-10-CM

## 2021-10-04 RX ORDER — AMLODIPINE BESYLATE 10 MG/1
TABLET ORAL
Qty: 30 TABLET | Refills: 0 | Status: SHIPPED | OUTPATIENT
Start: 2021-10-04 | End: 2021-12-02

## 2021-10-04 RX ORDER — LABETALOL 100 MG/1
TABLET, FILM COATED ORAL
Qty: 90 TABLET | Refills: 0 | Status: SHIPPED | OUTPATIENT
Start: 2021-10-04 | End: 2021-12-10

## 2021-11-16 ENCOUNTER — OFFICE VISIT (OUTPATIENT)
Dept: CARDIOLOGY | Facility: CLINIC | Age: 51
End: 2021-11-16

## 2021-11-16 VITALS
HEART RATE: 60 BPM | SYSTOLIC BLOOD PRESSURE: 110 MMHG | HEIGHT: 69 IN | OXYGEN SATURATION: 98 % | BODY MASS INDEX: 38.51 KG/M2 | WEIGHT: 260 LBS | DIASTOLIC BLOOD PRESSURE: 78 MMHG

## 2021-11-16 DIAGNOSIS — I77.810 ASCENDING AORTA DILATATION (HCC): ICD-10-CM

## 2021-11-16 DIAGNOSIS — I10 PRIMARY HYPERTENSION: ICD-10-CM

## 2021-11-16 DIAGNOSIS — I69.30 HISTORY OF HEMORRHAGIC CEREBROVASCULAR ACCIDENT (CVA) WITH RESIDUAL DEFICIT: ICD-10-CM

## 2021-11-16 DIAGNOSIS — E78.5 HYPERLIPIDEMIA, UNSPECIFIED HYPERLIPIDEMIA TYPE: Primary | ICD-10-CM

## 2021-11-16 DIAGNOSIS — R74.8 ELEVATED CPK: ICD-10-CM

## 2021-11-16 PROCEDURE — 99213 OFFICE O/P EST LOW 20 MIN: CPT | Performed by: INTERNAL MEDICINE

## 2021-11-17 NOTE — PROGRESS NOTES
Date of Office Visit: 2021  Encounter Provider: Jeremiah Bradford MD  Place of Service: [facility] CARDIOLOGY  Patient Name: Gaurav Mora  :1970    Chief complaint: Follow-up for hypertension, prior hemorrhagic CVA, elevated   CPK level, hyperlipidemia, and ascending aorta dilatation.    History of Present Illness:    I had the pleasure of seeing the patient in cardiology office on 2021.  He is a   very pleasant 51 year-old white male with a history of hypertension, obstructive   sleep apnea, and hemorrhagic CVA who presents for follow-up. The patient's   father is with him again today and assists significantly in his care.  I initially saw   him in the office on 2018.     The patient was visiting in [place], [place], for Thanksgiving in . He experienced   right sided neurological symptoms and visual disturbances on 2017. He was   brought to the hospital at that time where he was found to have an extensive   hemorrhagic CVA.  His blood pressure upon presentation was 242/130.  He was   unaware that he had hypertension prior to this, and it was felt that the hypertension   caused the brain hemorrhage.  At that time, he was been placed on amlodipine,   labetalol, lisinopril, and spironolactone with good results.  He still does have   complete loss of sensation on the right side (although he still has good strength in   the right side), as well as loss of peripheral vision.  He did have an echocardiogram   in [place] the time of his stroke on 2017 which showed an ejection fraction of   60%, and a very mildly dilated proximal aorta at 3.6 cm.  He did have a subsequent   CT scan of his chest on 2019 which showed mild enlargement of the ascending   aorta at 3.8 cm.  He also was found to have a significantly elevated CK level of 1044   on 2019.  He was taken off of Crestor at that time, and the level did decrease;  however, it did remain  chronically elevated afterwards.  He actually had a muscle   biopsy by Dr. Jessie Garner in rheumatology which did not show any evidence   of muscle breakdown.    The patient presents today for follow-up.  He is again accompanied by his father.    He has really been doing well.  He has had no chest pain or shortness of breath.    His blood pressure has been well controlled.  He again had Repatha denied by his   insurance company.  He is also now getting allergy shots.      Past Medical History:   Diagnosis Date   • Elevated CK    • Environmental allergies    • Headache, tension-type    • Hemorrhagic stroke (HCC) 11/24/2017    Residual peripheral vision loss, right-sided sensation loss   • History of bladder infections 12/2017   • Hyperlipidemia    • Hypertension    • Lung nodule    • SHANE on CPAP    • PONV (postoperative nausea and vomiting)    • Stroke (HCC) 2017       Past Surgical History:   Procedure Laterality Date   • APPENDECTOMY N/A 1987   • MUSCLE BIOPSY Left 8/19/2020    Procedure: quadriceps muscle biopsy;  Surgeon: Paulina Nian MD;  Location: [facility];  Service: General;  Laterality: Left;       Current Outpatient Medications on File Prior to Visit   Medication Sig Dispense Refill   • acetaminophen (TYLENOL) 325 MG tablet Take 650 mg by mouth Every 4 (Four) Hours As Needed for Mild Pain .     • amLODIPine (NORVASC) 10 MG tablet TAKE 1 TABLET DAILY 30 tablet 0   • aspirin (Aspirin Adult Low Dose) 81 MG EC tablet Take 1 tablet by mouth.     • Azelastine-Fluticasone 137-50 MCG/ACT suspension 2 sprays into the nostril(s) as directed by provider Daily.     • clotrimazole (Lotrimin AF) 1 % cream Apply  topically to the appropriate area as directed 2 (Two) Times a Day. 28 g 1   • Evolocumab (REPATHA) solution prefilled syringe injection Inject 1 mL under the skin into the appropriate area as directed Every 14 (Fourteen) Days. 2 mL 11   • fluticasone (FLONASE) 50 MCG/ACT nasal spray 2 sprays into the  nostril(s) as directed by provider Daily.     • labetalol (NORMODYNE) 100 MG tablet TAKE 1 TABLET THREE TIMES A DAY FOR HIGH BLOOD PRESSURE DISORDER (MUST SCHEDULE APPOINTMENT FOR MORE REFILLS) 90 tablet 0   • lisinopril (PRINIVIL,ZESTRIL) 20 MG tablet TAKE 1 TABLET DAILY 90 tablet 0   • loratadine (Loradamed) 10 MG tablet Take 1 tablet by mouth.     • miconazole (Lotrimin AF) 2 % powder Apply  topically to the appropriate area as directed 2 (two) times a day. 71 g 1   • montelukast (SINGULAIR) 10 MG tablet      • omeprazole (priLOSEC) 20 MG capsule Take 20 mg by mouth Daily.     • ondansetron (Zofran) 4 MG tablet Take 1 tablet by mouth Every 8 (Eight) Hours As Needed for Nausea or Vomiting for up to 20 doses. 20 tablet 0   • spironolactone (ALDACTONE) 25 MG tablet TAKE 1 TABLET DAILY 90 tablet 0   • traMADol-acetaminophen (Ultracet) 37.5-325 MG per tablet 1-2 tablets every 6 hours as needed 10 tablet 0   • vitamin D (ERGOCALCIFEROL) 1.25 MG (57763 UT) capsule capsule TAKE 1 CAPSULE BY MOUTH EVERY 7 (SEVEN) DAYS. 4 capsule 0     No current facility-administered medications on file prior to visit.     Allergies as of 11/16/2021   • (No Known Allergies)     Social History     Socioeconomic History   • Marital status: Single   • Number of children: 0   Tobacco Use   • Smoking status: Never Smoker   • Smokeless tobacco: Never Used   • Tobacco comment: minimal caffeine   Substance and Sexual Activity   • Alcohol use: Not Currently     Alcohol/week: 1.0 standard drink     Types: 1 Cans of beer per week     Comment: This is rare.   • Drug use: Never   • Sexual activity: Never     Family History   Problem Relation Age of Onset   • Breast cancer Mother    • Atrial fibrillation Father    • Hypertension Father    • Skin cancer Father    • Colon cancer Paternal Grandmother    • Heart disease Maternal Grandfather    • Parkinsonism Maternal Grandfather    • Heart disease Maternal Aunt    • Heart disease Maternal Uncle    • Dominic  "Hyperthermia Neg Hx        Review of Systems   All other systems reviewed and are negative.     Objective:     Vitals:    11/16/21 1342   BP: 110/78   Pulse: 60   SpO2: 98%   Weight: 118 kg (260 lb)   Height: 175.3 cm (69.02\")     Body mass index is 38.38 kg/m².    Physical Exam  Constitutional:       Appearance: He is well-developed.   HENT:      Head: Normocephalic and atraumatic.   Eyes:      Conjunctiva/sclera: Conjunctivae normal.   Cardiovascular:      Rate and Rhythm: Normal rate and regular rhythm.      Heart sounds: No murmur heard.  No friction rub. No gallop.    Pulmonary:      Effort: Pulmonary effort is normal.      Breath sounds: Normal breath sounds.   Abdominal:      Palpations: Abdomen is soft.      Tenderness: There is no abdominal tenderness.   Musculoskeletal:      Cervical back: Neck supple.   Skin:     General: Skin is warm.   Neurological:      Mental Status: He is alert and oriented to person, place, and time.   Psychiatric:         Behavior: Behavior normal.       Lab Review:   Procedures    Cardiac Procedures:  1.  Echocardiogram on 11/27/2017 in [place], [place]: The ejection fraction was 60%. The   aortic root measured 3.8 cm.  The proximal aorta was 3.6 cm.  2.  Noncontrast CT scan of the chest on 9/9/2019: There was a stable pulmonary nodule   left upper lobe.  There was hepatic steatosis.  There was mild enlargement of the   ascending aorta at 3.8 cm.  3.  Noncontrast CT scan of the chest on 3/10/2021: Ascending aorta measured up to 3.8 cm.    Assessment:       Diagnosis Plan   1. Hyperlipidemia, unspecified hyperlipidemia type  Lipid Panel   2. Primary hypertension     3. History of hemorrhagic cerebrovascular accident (CVA) with residual deficit     4. Elevated CPK     5. Ascending aorta dilatation (HCC)       Plan:       Again, he seems to be doing well.  He is now taking allergy shots, although his blood pressure does not appear to be affected by this.  His blood pressure has been " controlled, and is 110/78 today.  It was 110/70 the last time I saw him.  He has not had any high readings recently.  He will continue on the amlodipine 10 mg/day, labetalol 100 mg 3 times per day, lisinopril 20 mg/day, and spironolactone 25 mg/day.  He has done very well on this regimen so far.  He is very faithful about wearing his CPAP mask at night.  He should remain on aspirin for life given his history of stroke.    With regards to the statin therapy, this is obviously ideal given his history of stroke (even though this was a hemorrhagic stroke).  He has had grossly elevated LDL levels in the past of up to 192.  His CK level did decrease after stopping the Crestor in the past, but it remained significantly elevated.  He has had a muscle biopsy by Dr. Jessie Garner in rheumatology.  This did not show any pathology.  However, I still feel that statins are not indicated with this level of CK elevation.  I have tried to get both Repatha and Praluent approved by his insurance company on multiple occasions, and it has been denied.  I am going to recheck his lipid panel to see where we are at this point.    He did have a CT scan without contrast on 3/10/2021 to reassess a lung nodule.  I measured his aorta at 3.8 cm, which is very mildly dilated.  I will check on this again in the future.    I will see him back in the office in 6 months unless other issues arise.

## 2021-12-02 DIAGNOSIS — I10 ESSENTIAL HYPERTENSION: ICD-10-CM

## 2021-12-02 RX ORDER — AMLODIPINE BESYLATE 10 MG/1
TABLET ORAL
Qty: 30 TABLET | Refills: 0 | Status: SHIPPED | OUTPATIENT
Start: 2021-12-02 | End: 2021-12-17 | Stop reason: SDUPTHER

## 2021-12-10 DIAGNOSIS — I10 ESSENTIAL HYPERTENSION: ICD-10-CM

## 2021-12-10 RX ORDER — LABETALOL 100 MG/1
100 TABLET, FILM COATED ORAL 3 TIMES DAILY
Qty: 270 TABLET | Refills: 1 | Status: SHIPPED | OUTPATIENT
Start: 2021-12-10 | End: 2022-05-24

## 2021-12-10 RX ORDER — LISINOPRIL 20 MG/1
20 TABLET ORAL DAILY
Qty: 90 TABLET | Refills: 1 | Status: SHIPPED | OUTPATIENT
Start: 2021-12-10 | End: 2022-06-08

## 2021-12-10 RX ORDER — SPIRONOLACTONE 25 MG/1
25 TABLET ORAL DAILY
Qty: 90 TABLET | Refills: 1 | Status: SHIPPED | OUTPATIENT
Start: 2021-12-10 | End: 2022-06-08

## 2021-12-17 ENCOUNTER — OFFICE VISIT (OUTPATIENT)
Dept: FAMILY MEDICINE CLINIC | Facility: CLINIC | Age: 51
End: 2021-12-17

## 2021-12-17 VITALS
HEIGHT: 69 IN | WEIGHT: 260 LBS | TEMPERATURE: 97.1 F | HEART RATE: 65 BPM | OXYGEN SATURATION: 98 % | SYSTOLIC BLOOD PRESSURE: 132 MMHG | DIASTOLIC BLOOD PRESSURE: 84 MMHG | BODY MASS INDEX: 38.51 KG/M2 | RESPIRATION RATE: 18 BRPM

## 2021-12-17 DIAGNOSIS — R79.89 ELEVATED LIVER FUNCTION TESTS: ICD-10-CM

## 2021-12-17 DIAGNOSIS — R47.01 EXPRESSIVE APHASIA: ICD-10-CM

## 2021-12-17 DIAGNOSIS — G47.33 OBSTRUCTIVE SLEEP APNEA: ICD-10-CM

## 2021-12-17 DIAGNOSIS — Z12.11 COLON CANCER SCREENING: ICD-10-CM

## 2021-12-17 DIAGNOSIS — R74.8 ELEVATED CK: ICD-10-CM

## 2021-12-17 DIAGNOSIS — E78.5 HYPERLIPIDEMIA LDL GOAL <70: ICD-10-CM

## 2021-12-17 DIAGNOSIS — I10 ESSENTIAL HYPERTENSION: Primary | ICD-10-CM

## 2021-12-17 DIAGNOSIS — I61.9 HEMORRHAGIC STROKE: ICD-10-CM

## 2021-12-17 DIAGNOSIS — E55.9 VITAMIN D DEFICIENCY: ICD-10-CM

## 2021-12-17 PROCEDURE — 99214 OFFICE O/P EST MOD 30 MIN: CPT | Performed by: PHYSICIAN ASSISTANT

## 2021-12-17 RX ORDER — TRIAMCINOLONE ACETONIDE 1 MG/G
CREAM TOPICAL
COMMUNITY
Start: 2021-11-17 | End: 2022-07-22

## 2021-12-17 RX ORDER — AMLODIPINE BESYLATE 10 MG/1
TABLET ORAL
Qty: 90 TABLET | Refills: 0 | Status: SHIPPED | OUTPATIENT
Start: 2021-12-17 | End: 2022-02-23

## 2021-12-17 RX ORDER — KETOCONAZOLE 20 MG/G
CREAM TOPICAL
COMMUNITY
Start: 2021-11-17 | End: 2022-07-22

## 2021-12-17 RX ORDER — ERGOCALCIFEROL 1.25 MG/1
50000 CAPSULE ORAL
Qty: 4 CAPSULE | Refills: 0 | Status: SHIPPED | OUTPATIENT
Start: 2021-12-17 | End: 2021-12-25

## 2021-12-17 RX ORDER — CLOBETASOL PROPIONATE 0.5 MG/ML
LOTION TOPICAL
COMMUNITY
Start: 2021-12-01 | End: 2022-07-22

## 2021-12-22 LAB
25(OH)D3+25(OH)D2 SERPL-MCNC: 30.7 NG/ML (ref 30–100)
ALBUMIN SERPL-MCNC: 4.4 G/DL (ref 4–5)
ALBUMIN/GLOB SERPL: 2.2 {RATIO} (ref 1.2–2.2)
ALP SERPL-CCNC: 55 IU/L (ref 44–121)
ALT SERPL-CCNC: 29 IU/L (ref 0–44)
APPEARANCE UR: CLEAR
AST SERPL-CCNC: 25 IU/L (ref 0–40)
BACTERIA #/AREA URNS HPF: NORMAL /[HPF]
BASOPHILS # BLD AUTO: 0.1 X10E3/UL (ref 0–0.2)
BASOPHILS NFR BLD AUTO: 1 %
BILIRUB SERPL-MCNC: 0.4 MG/DL (ref 0–1.2)
BILIRUB UR QL STRIP: NEGATIVE
BUN SERPL-MCNC: 12 MG/DL (ref 6–24)
BUN/CREAT SERPL: 12 (ref 9–20)
CALCIUM SERPL-MCNC: 9.3 MG/DL (ref 8.7–10.2)
CASTS URNS QL MICRO: NORMAL /LPF
CHLORIDE SERPL-SCNC: 101 MMOL/L (ref 96–106)
CHOLEST SERPL-MCNC: 220 MG/DL (ref 100–199)
CK SERPL-CCNC: 808 U/L (ref 49–439)
CO2 SERPL-SCNC: 23 MMOL/L (ref 20–29)
COLOR UR: YELLOW
CREAT SERPL-MCNC: 1 MG/DL (ref 0.76–1.27)
EOSINOPHIL # BLD AUTO: 0.2 X10E3/UL (ref 0–0.4)
EOSINOPHIL NFR BLD AUTO: 3 %
EPI CELLS #/AREA URNS HPF: NORMAL /HPF (ref 0–10)
ERYTHROCYTE [DISTWIDTH] IN BLOOD BY AUTOMATED COUNT: 12.4 % (ref 11.6–15.4)
GLOBULIN SER CALC-MCNC: 2 G/DL (ref 1.5–4.5)
GLUCOSE SERPL-MCNC: 102 MG/DL (ref 65–99)
GLUCOSE UR QL: NEGATIVE
HCT VFR BLD AUTO: 43.8 % (ref 37.5–51)
HDLC SERPL-MCNC: 43 MG/DL
HGB BLD-MCNC: 14.4 G/DL (ref 13–17.7)
HGB UR QL STRIP: NEGATIVE
IMM GRANULOCYTES # BLD AUTO: 0 X10E3/UL (ref 0–0.1)
IMM GRANULOCYTES NFR BLD AUTO: 0 %
KETONES UR QL STRIP: NEGATIVE
LDLC SERPL CALC-MCNC: 164 MG/DL (ref 0–99)
LDLC/HDLC SERPL: 3.8 RATIO (ref 0–3.6)
LEUKOCYTE ESTERASE UR QL STRIP: NEGATIVE
LYMPHOCYTES # BLD AUTO: 2.1 X10E3/UL (ref 0.7–3.1)
LYMPHOCYTES NFR BLD AUTO: 28 %
MCH RBC QN AUTO: 27.1 PG (ref 26.6–33)
MCHC RBC AUTO-ENTMCNC: 32.9 G/DL (ref 31.5–35.7)
MCV RBC AUTO: 83 FL (ref 79–97)
MICRO URNS: NORMAL
MICRO URNS: NORMAL
MONOCYTES # BLD AUTO: 0.7 X10E3/UL (ref 0.1–0.9)
MONOCYTES NFR BLD AUTO: 10 %
NEUTROPHILS # BLD AUTO: 4.3 X10E3/UL (ref 1.4–7)
NEUTROPHILS NFR BLD AUTO: 58 %
NITRITE UR QL STRIP: NEGATIVE
PH UR STRIP: 5.5 [PH] (ref 5–7.5)
PLATELET # BLD AUTO: 251 X10E3/UL (ref 150–450)
POTASSIUM SERPL-SCNC: 4.4 MMOL/L (ref 3.5–5.2)
PROT SERPL-MCNC: 6.4 G/DL (ref 6–8.5)
PROT UR QL STRIP: NEGATIVE
RBC # BLD AUTO: 5.31 X10E6/UL (ref 4.14–5.8)
RBC #/AREA URNS HPF: NORMAL /HPF (ref 0–2)
SODIUM SERPL-SCNC: 138 MMOL/L (ref 134–144)
SP GR UR: 1.02 (ref 1–1.03)
TRIGL SERPL-MCNC: 72 MG/DL (ref 0–149)
TSH SERPL DL<=0.005 MIU/L-ACNC: 2.29 UIU/ML (ref 0.45–4.5)
URINALYSIS REFLEX: NORMAL
UROBILINOGEN UR STRIP-MCNC: 0.2 MG/DL (ref 0.2–1)
VLDLC SERPL CALC-MCNC: 13 MG/DL (ref 5–40)
WBC # BLD AUTO: 7.4 X10E3/UL (ref 3.4–10.8)
WBC #/AREA URNS HPF: NORMAL /HPF (ref 0–5)

## 2022-01-06 ENCOUNTER — PREP FOR SURGERY (OUTPATIENT)
Dept: OTHER | Facility: HOSPITAL | Age: 52
End: 2022-01-06

## 2022-01-06 DIAGNOSIS — Z12.11 COLON CANCER SCREENING: Primary | ICD-10-CM

## 2022-01-18 PROBLEM — Z12.11 COLON CANCER SCREENING: Status: ACTIVE | Noted: 2022-01-18

## 2022-01-18 RX ORDER — ERGOCALCIFEROL 1.25 MG/1
CAPSULE ORAL
Qty: 13 CAPSULE | Refills: 1 | Status: SHIPPED | OUTPATIENT
Start: 2022-01-18 | End: 2022-08-24

## 2022-01-18 NOTE — TELEPHONE ENCOUNTER
Reviewed chart for refill status.     Last appointment note: Vitamin D deficiency- Continue vitamin D 50,000 IU once weekly. Dosing recommendations pending labs.   Lab note did not state status of treatment plan for continuing this med    Please advise

## 2022-01-19 NOTE — TELEPHONE ENCOUNTER
Without directions to change vitamin D dosing and normal levels, it is to be continued at the same dose.

## 2022-02-17 ENCOUNTER — ANESTHESIA (OUTPATIENT)
Dept: GASTROENTEROLOGY | Facility: HOSPITAL | Age: 52
End: 2022-02-17

## 2022-02-17 ENCOUNTER — HOSPITAL ENCOUNTER (OUTPATIENT)
Facility: HOSPITAL | Age: 52
Setting detail: HOSPITAL OUTPATIENT SURGERY
Discharge: HOME | End: 2022-02-17
Attending: SURGERY | Admitting: SURGERY

## 2022-02-17 ENCOUNTER — ANESTHESIA EVENT (OUTPATIENT)
Dept: GASTROENTEROLOGY | Facility: HOSPITAL | Age: 52
End: 2022-02-17

## 2022-02-17 VITALS
OXYGEN SATURATION: 98 % | SYSTOLIC BLOOD PRESSURE: 132 MMHG | BODY MASS INDEX: 36.91 KG/M2 | RESPIRATION RATE: 17 BRPM | HEIGHT: 69 IN | HEART RATE: 73 BPM | TEMPERATURE: 99.1 F | DIASTOLIC BLOOD PRESSURE: 90 MMHG | WEIGHT: 249.2 LBS

## 2022-02-17 DIAGNOSIS — Z12.11 COLON CANCER SCREENING: ICD-10-CM

## 2022-02-17 PROCEDURE — 45380 COLONOSCOPY AND BIOPSY: CPT | Performed by: SURGERY

## 2022-02-17 PROCEDURE — S0260 H&P FOR SURGERY: HCPCS | Performed by: SURGERY

## 2022-02-17 PROCEDURE — 88305 TISSUE EXAM BY PATHOLOGIST: CPT | Performed by: SURGERY

## 2022-02-17 PROCEDURE — 25010000002 PROPOFOL 10 MG/ML EMULSION: Performed by: ANESTHESIOLOGY

## 2022-02-17 RX ORDER — PROPOFOL 10 MG/ML
VIAL (ML) INTRAVENOUS AS NEEDED
Status: DISCONTINUED | OUTPATIENT
Start: 2022-02-17 | End: 2022-02-17 | Stop reason: SURG

## 2022-02-17 RX ORDER — SODIUM CHLORIDE, SODIUM LACTATE, POTASSIUM CHLORIDE, CALCIUM CHLORIDE 600; 310; 30; 20 MG/100ML; MG/100ML; MG/100ML; MG/100ML
30 INJECTION, SOLUTION INTRAVENOUS CONTINUOUS PRN
Status: DISCONTINUED | OUTPATIENT
Start: 2022-02-17 | End: 2022-02-17 | Stop reason: HOSPADM

## 2022-02-17 RX ORDER — SODIUM CHLORIDE 0.9 % (FLUSH) 0.9 %
10 SYRINGE (ML) INJECTION AS NEEDED
Status: DISCONTINUED | OUTPATIENT
Start: 2022-02-17 | End: 2022-02-17 | Stop reason: HOSPADM

## 2022-02-17 RX ORDER — LIDOCAINE HYDROCHLORIDE 20 MG/ML
INJECTION, SOLUTION INFILTRATION; PERINEURAL AS NEEDED
Status: DISCONTINUED | OUTPATIENT
Start: 2022-02-17 | End: 2022-02-17 | Stop reason: SURG

## 2022-02-17 RX ORDER — PROPOFOL 10 MG/ML
VIAL (ML) INTRAVENOUS CONTINUOUS PRN
Status: DISCONTINUED | OUTPATIENT
Start: 2022-02-17 | End: 2022-02-17 | Stop reason: SURG

## 2022-02-17 RX ADMIN — PROPOFOL 200 MCG/KG/MIN: 10 INJECTION, EMULSION INTRAVENOUS at 10:25

## 2022-02-17 RX ADMIN — SODIUM CHLORIDE, POTASSIUM CHLORIDE, SODIUM LACTATE AND CALCIUM CHLORIDE 30 ML/HR: 600; 310; 30; 20 INJECTION, SOLUTION INTRAVENOUS at 09:30

## 2022-02-17 RX ADMIN — Medication 100 MG: at 10:25

## 2022-02-17 RX ADMIN — LIDOCAINE HYDROCHLORIDE 60 MG: 20 INJECTION, SOLUTION INFILTRATION; PERINEURAL at 10:25

## 2022-02-17 NOTE — OP NOTE
Surgeon:     Jeronimo Paul Jr., M.D.    Preoperative Diagnosis:     Need for screening colonoscopy    Postoperative Diagnosis:     Left colon polyp    Procedure Performed:     Colonoscopy with biopsy of left colon polyp    Indications:     The patient is a pleasant 51-year-old male who presents with screening colonoscopy.  The patient understands the indications, alternatives, risks, and benefits of the procedure and wishes to proceed.    Procedure:     The patient was identified, taken to the endoscopy suite, and place in the left side down decubitus procedure.  The patient underwent a MAC anesthesia and was appropriately monitored through the case by the anesthesia personnel.  A rectal exam was performed that was normal.  The colonoscope was introduced into the rectum and advanced very carefully to the cecum that was identified by the cecal strap, ileocecal valve, and the appendiceal orifice.  The scope was then slowly withdrawn with careful circumferential examination of the mucosa performed.  The bowel prep was good allowing adequate visualization of mucosal surfaces.  The scope was withdrawn.  The patient tolerated the procedure well and was transferred the recovery area in stable condition.    Findings:    There was a 4 mm polyp in the left colon that was completely removed by cold biopsy forceps and retrieved.    Recommendations:     1.  Await pathology results from polypectomy.  2.  Repeat colonoscopy in 5 years.    Jeronimo Paul Jr., M.D.

## 2022-02-17 NOTE — ANESTHESIA POSTPROCEDURE EVALUATION
Patient: Gaurav Mora    Procedure Summary     Date: 02/17/22 Room / Location: [facility] ENDOSCOPY 9 /  [facility]    Anesthesia Start: 1021 Anesthesia Stop: 1050    Procedure: COLONOSCOPY into  cecum with cold bx polypectomy (N/A ) Diagnosis:       Colon cancer screening      (Colon cancer screening [Z12.11])    Surgeons: Jeronimo Paul Jr., MD Provider: Reece Yanes MD    Anesthesia Type: MAC ASA Status: 3          Anesthesia Type: MAC    Vitals  Vitals Value Taken Time   /90 02/17/22 1109   Temp     Pulse 73 02/17/22 1109   Resp 17 02/17/22 1109   SpO2 98 % 02/17/22 1109           Post Anesthesia Care and Evaluation    Patient location during evaluation: bedside  Patient participation: complete - patient participated  Level of consciousness: responsive to light touch, responsive to verbal stimuli and sleepy but conscious  Pain score: 0  Pain management: adequate  Airway patency: patent  Anesthetic complications: No anesthetic complications  PONV Status: none  Cardiovascular status: acceptable and hemodynamically stable  Respiratory status: acceptable  Hydration status: acceptable

## 2022-02-17 NOTE — H&P
[facility]   HISTORY AND PHYSICAL    Patient Name: Gaurav Mora  : 1970  MRN: [id number]  Primary Care Physician:  Rebecca Nixon PA  Date of admission: 2022    Subjective   Subjective     Chief Complaint: Need for screening colonoscopy    History of Present Illness     The patient is a pleasant 51-year-old male who presents for screening colonoscopy.  He has no significant GI symptoms.  He has never had a previous colonoscopy.    Review of Systems   Constitutional: Negative for fatigue and fever.   Respiratory: Negative for chest tightness and shortness of breath.    Cardiovascular: Negative for chest pain and palpitations.   Gastrointestinal: Negative for abdominal pain, blood in stool, constipation, diarrhea, nausea and vomiting.        Personal History     Past Medical History:   Diagnosis Date   • Elevated CK    • Environmental allergies    • Headache, tension-type    • Hemorrhagic cerebrovascular accident (CVA) (HCC)    • Hemorrhagic stroke (HCC) 2017    Residual peripheral vision loss, right-sided sensation loss   • History of bladder infections 2017   • Hyperlipidemia    • Hypertension    • Lung nodule    • SHANE on CPAP     WEARS CPAP   • PONV (postoperative nausea and vomiting)    • Stroke (HCC)        Past Surgical History:   Procedure Laterality Date   • APPENDECTOMY N/A    • MUSCLE BIOPSY Left 2020    Procedure: quadriceps muscle biopsy;  Surgeon: Paulina Nina MD;  Location: [facility];  Service: General;  Laterality: Left;       Family History: family history includes Atrial fibrillation in his father; Breast cancer in his mother; Colon cancer in his paternal grandmother; Heart disease in his maternal aunt, maternal grandfather, and maternal uncle; Hypertension in his father; Parkinsonism in his maternal grandfather; Skin cancer in his father. Otherwise pertinent FHx was reviewed and not pertinent to current issue.    Social History:  reports that he has  never smoked. He has never used smokeless tobacco. He reports previous alcohol use of about 1.0 standard drink of alcohol per week. He reports that he does not use drugs.    Home Medications:  Azelastine-Fluticasone, acetaminophen, amLODIPine, aspirin, clobetasol, ketoconazole, labetalol, lisinopril, miconazole, montelukast, omeprazole, spironolactone, triamcinolone, and vitamin D    Allergies:  No Known Allergies    Objective    Objective     Vitals:   Temp:  [99.1 °F (37.3 °C)] 99.1 °F (37.3 °C)  Heart Rate:  [85] 85  Resp:  [8] 8  BP: (134)/(93) 134/93    Physical Exam  Constitutional:       Appearance: Normal appearance. He is well-developed. He is not toxic-appearing.   Eyes:      General: No scleral icterus.  Pulmonary:      Effort: Pulmonary effort is normal. No respiratory distress.   Abdominal:      Palpations: Abdomen is soft.      Tenderness: There is no abdominal tenderness.   Skin:     General: Skin is warm and dry.   Neurological:      Mental Status: He is alert and oriented to person, place, and time.   Psychiatric:         Behavior: Behavior normal.         Thought Content: Thought content normal.         Judgment: Judgment normal.         Assessment/Plan   Assessment / Plan     Brief Patient Summary:  Gaurav Mora is a 51 y.o. male who is in need of a screening colonoscopy.    Active Hospital Problems:  Active Hospital Problems    Diagnosis    • **Colon cancer screening      Added automatically from request for surgery [id number]       Plan: Colonoscopy.  The patient understands the indications, alternatives, risks, and benefits of the procedure and wishes to proceed.      Jeronimo Paul Jr., MD

## 2022-02-17 NOTE — DISCHARGE INSTRUCTIONS
For the next 24 hours patient needs to be with a responsible adult.    For 24 hours DO NOT drive, operate machinery, appliances, drink alcohol, make important decisions or sign legal documents.    Start with a light or bland diet if you are feeling sick to your stomach otherwise advance to regular diet as tolerated.    Follow recommendations on procedure report if provided by your doctor.    Call Dr Paul for problems [phone]    Problems may include but not limited to: large amounts of bleeding, trouble breathing, repeated vomiting, severe unrelieved pain, fever or chills.

## 2022-02-17 NOTE — ANESTHESIA PREPROCEDURE EVALUATION
Anesthesia Evaluation     history of anesthetic complications: PONV               Airway   Mallampati: II  TM distance: >3 FB  Neck ROM: full  No difficulty expected  Dental - normal exam     Pulmonary    (+) pneumonia , sleep apnea on CPAP,   (-) rhonchi, decreased breath sounds, wheezes  Cardiovascular     Rhythm: regular  Rate: normal    (+) hypertension, hyperlipidemia,   (-) murmur      Neuro/Psych  (+) CVA residual symptoms,    Dizziness: peripheral vision loss numbness right side   GI/Hepatic/Renal/Endo    (+) obesity,   renal disease,     Musculoskeletal     Abdominal     Abdomen: soft.   Substance History      OB/GYN          Other                        Anesthesia Plan    ASA 3     MAC   total IV anesthesia  intravenous induction     Anesthetic plan, all risks, benefits, and alternatives have been provided, discussed and informed consent has been obtained with: patient.        CODE STATUS:

## 2022-02-18 LAB
LAB AP CASE REPORT: NORMAL
PATH REPORT.FINAL DX SPEC: NORMAL
PATH REPORT.GROSS SPEC: NORMAL

## 2022-02-21 ENCOUNTER — TELEPHONE (OUTPATIENT)
Dept: SURGERY | Facility: CLINIC | Age: 52
End: 2022-02-21

## 2022-02-21 NOTE — TELEPHONE ENCOUNTER
----- Message from Jeronimo Paul Jr., MD sent at 2/18/2022 10:46 AM EST -----  Please contact this patient to inform that the polyp is benign and a repeat colonoscopy is needed in 5 years.  Please place in recall for a colonoscopy in 5 years.  Thanks.

## 2022-02-21 NOTE — TELEPHONE ENCOUNTER
LM for patient to make aware that his polyp is benign and he will need a repeat colonoscopy in 5 years. Left direct number for patient if he has any questions.

## 2022-02-23 DIAGNOSIS — I10 ESSENTIAL HYPERTENSION: ICD-10-CM

## 2022-02-23 RX ORDER — AMLODIPINE BESYLATE 10 MG/1
TABLET ORAL
Qty: 90 TABLET | Refills: 3 | Status: SHIPPED | OUTPATIENT
Start: 2022-02-23 | End: 2023-03-17

## 2022-03-21 ENCOUNTER — TELEPHONE (OUTPATIENT)
Dept: NEUROLOGY | Facility: CLINIC | Age: 52
End: 2022-03-21

## 2022-04-01 ENCOUNTER — OFFICE VISIT (OUTPATIENT)
Dept: NEUROLOGY | Facility: CLINIC | Age: 52
End: 2022-04-01

## 2022-04-01 VITALS
HEIGHT: 69 IN | HEART RATE: 70 BPM | SYSTOLIC BLOOD PRESSURE: 116 MMHG | BODY MASS INDEX: 36.98 KG/M2 | WEIGHT: 249.7 LBS | OXYGEN SATURATION: 99 % | DIASTOLIC BLOOD PRESSURE: 70 MMHG

## 2022-04-01 DIAGNOSIS — I69.30 SEQUELAE, POST-STROKE: Primary | ICD-10-CM

## 2022-04-01 DIAGNOSIS — R74.8 HYPERCKEMIA: ICD-10-CM

## 2022-04-01 PROCEDURE — 99214 OFFICE O/P EST MOD 30 MIN: CPT | Performed by: PSYCHIATRY & NEUROLOGY

## 2022-04-01 NOTE — PROGRESS NOTES
CC: Hemorrhagic stroke sequelae and hyper CK emia    HPI:  Gaurav Mora is a  51 y.o.  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  I saw him last 3/23/2021.  The patient's history is taken from the previous note with additions/modifications as indicated:    He has history of stroke associated with extremely high blood pressure on 11/23/17.  He was in [place] when this occurred.  He was taken from the [facility] to a stroke center [facility] and saw Dr. Orona and subsequently saw Dr. Pan.  The patient had a left basal ganglia/temporal lobe hemorrhage with some intraventricular hemorrhage present.  He had an angiogram which did not show evidence of an AVM.  The anterior communicating artery was small and no posterior communicating arteries were identified.  The stroke caused a right hemiparesis, some numbness and right visual field cut.  He has not had recurrent TIA or stroke symptoms.  His blood pressure has been well controlled on amlodipine, labetalol and lisinopril.  He was treated with a statin but was found to have an elevated CK of around 1000.  The statin was stopped but even after a reasonable time past his CK was still 786.  Patient did not have clinical evidence of a myopathy such as proximal weakness.  I reviewed options with him such as EMG and muscle biopsy but I did not feel these would lead to a separate treatable diagnosis given the likelihood he had hyper CK emia.     The patient was subsequently referred to Dr. Jessie Garner, rheumatology 5/21/2020 and again 7/28/2020.  She obtained an MRI of the thigh which did not show myositis changes.  A muscle biopsy was obtained which was performed by Dr. Nina and reported in the [facility].  The study showed rather pronounced enlargement predominantly type I fibers with no evidence of inflammation.  Finding was nonspecific but could be  seen in neuropathic as well as myotonic dystrophy patients.  As noted above the patient did not have any findings to help differentiate the origin of the CK elevation and hence the muscle biopsy findings.     Since the patient has a significantly elevated CK made worse by statins he is not a candidate for further statin therapy.  The patient indicates Dr. Bradford had ordered Repatha which was denied and also [facility].  The patient's father indicates he just got a denial letter.  It is unclear to me a reason for denial given the patient's circumstance given that his LDL cholesterol runs 150-190 and that he has symptomatic cerebrovascular disease with history of hemorrhagic stroke          Since I saw him he has been doing relatively well with 1 exception.  He has a chronic cough some of which is productive in some of which is not.  He is followed by an allergist and he gets shots every week which seemed to help some at the higher dose but there still seems to be some episodic throat clearing and he was told to talk to his neurologist about perhaps a tic disorder.  The patient does not have any other involuntary movements or utterances in his history.  His father had mentioned that his blood pressure was under good control but it has been a while in coming.  I asked if he is on lisinopril and of course he is.    He denies any other episodic neurological symptoms such as may be seen in a recurrent TIA or strokelike episode or seizure disorder.  He continues to have some gait disturbance related to his mild right hemiparesis.  He also has some expressive dysphasia          Past Medical History:   Diagnosis Date   • Elevated CK    • Environmental allergies    • Headache, tension-type    • Hemorrhagic cerebrovascular [facility] (CVA[facility])    • Hemorrhagic stroke (HCC) 11/24/2017    Residual peripheral vision loss, right-sided sensation loss   • History of bladder infections 12/2017   • Hyperlipidemia     • Hypertension    • Lung nodule    • SHANE on CPAP     WEARS CPAP   • PONV (postoperative nausea and vomiting)    • Stroke (HCC) 2017         Past Surgical History:   Procedure Laterality Date   • APPENDECTOMY N/A 1987   • COLONOSCOPY N/A 2/17/2022    Procedure: COLONOSCOPY into  cecum with cold bx polypectomy;  Surgeon: Jeronimo Paul Jr., MD;  Location: [facility] ENDOSCOPY;  Service: General;  Laterality: N/A;  pre: screen  post: polyp    • MUSCLE BIOPSY Left 8/19/2020    Procedure: quadriceps muscle biopsy;  Surgeon: Paulina Nina MD;  Location: [facility] MAIN OR;  Service: General;  Laterality: Left;           Current Outpatient Medications:   •  acetaminophen (TYLENOL) 325 MG tablet, Take 650 mg by mouth Every 4 (Four) Hours As Needed for Mild Pain ., Disp: , Rfl:   •  amLODIPine (NORVASC) 10 MG tablet, TAKE 1 TABLET DAILY, Disp: 90 tablet, Rfl: 3  •  aspirin 81 MG EC tablet, Take 1 tablet by mouth Daily., Disp: , Rfl:   •  Azelastine-Fluticasone 137-50 MCG/ACT suspension, 2 sprays into the nostril(s) as directed by provider As Needed., Disp: , Rfl:   •  clobetasol (CLOBEX) 0.05 % lotion, , Disp: , Rfl:   •  ketoconazole (NIZORAL) 2 % cream, , Disp: , Rfl:   •  labetalol (NORMODYNE) 100 MG tablet, Take 1 tablet by mouth 3 (Three) Times a Day., Disp: 270 tablet, Rfl: 1  •  lisinopril (PRINIVIL,ZESTRIL) 20 MG tablet, Take 1 tablet by mouth Daily., Disp: 90 tablet, Rfl: 1  •  montelukast (SINGULAIR) 10 MG tablet, , Disp: , Rfl:   •  omeprazole (priLOSEC) 20 MG capsule, Take 20 mg by mouth Daily., Disp: , Rfl:   •  spironolactone (ALDACTONE) 25 MG tablet, Take 1 tablet by mouth Daily., Disp: 90 tablet, Rfl: 1  •  triamcinolone (KENALOG) 0.1 % cream, , Disp: , Rfl:   •  vitamin D (ERGOCALCIFEROL) 1.25 MG (82889 UT) capsule capsule, TAKE ONE CAPSULE BY MOUTH EVERY 7 DAYS (Patient taking differently: Take 50,000 Units by mouth Every 7 (Seven) Days.), Disp: 13 capsule, Rfl: 1  •  miconazole (Lotrimin AF) 2 % powder, Apply   topically to the appropriate area as directed 2 (two) times a day., Disp: 71 g, Rfl: 1      Family History   Problem Relation Age of Onset   • Breast cancer Mother    • Atrial fibrillation Father    • Hypertension Father    • Skin cancer Father    • Colon cancer Paternal Grandmother    • Heart disease Maternal Grandfather    • Parkinsonism Maternal Grandfather    • Heart disease Maternal Aunt    • Heart disease Maternal Uncle    • Malig Hyperthermia Neg Hx          Social History     Socioeconomic History   • Marital status: Single   • Number of children: 0   Tobacco Use   • Smoking status: Never Smoker   • Smokeless tobacco: Never Used   • Tobacco comment: minimal caffeine   Vaping Use   • Vaping Use: Never used   Substance and Sexual Activity   • Alcohol use: Not Currently     Alcohol/week: 1.0 standard drink     Types: 1 Cans of beer per week     Comment: This is rare.   • Drug use: Never   • Sexual activity: Never         No Known Allergies      Pain Scale: 0/10 currently        ROS:  Review of Systems   Constitutional: Positive for fatigue.   HENT: Negative for ear pain, facial swelling, nosebleeds, tinnitus and trouble swallowing.    Eyes: Negative for photophobia, pain and visual disturbance.   Respiratory: Negative for choking, chest tightness, shortness of breath, wheezing and stridor.    Cardiovascular: Negative for chest pain, palpitations and leg swelling.   Musculoskeletal: Positive for gait problem (balance, cane). Negative for joint swelling, neck pain and neck stiffness.   Allergic/Immunologic: Negative for food allergies.   Neurological: Negative for dizziness, speech difficulty, weakness, light-headedness, numbness and headaches.   Hematological: Does not bruise/bleed easily.   Psychiatric/Behavioral: Negative for agitation, confusion, decreased concentration and sleep disturbance. The patient is not nervous/anxious.          I have reviewed and agree with the above ROS completed by the medical  "assistant.      Physical Exam:  Vitals:    04/01/22 1100   BP: 116/70   Pulse: 70   SpO2: 99%   Weight: 113 kg (249 lb 11.2 oz)   Height: 175.3 cm (69\")     Orthostatic BP:    Body mass index is 36.87 kg/m².    Physical Exam  General: Obese white male no acute distress  HEENT: Normocephalic no evidence of trauma  Neck: Supple.  No thyromegaly.  No cervical bruits.  Heart: Regular rate and rhythm without murmur  Extremities: No pedal edema      Neurological Exam:   Mental Status: Awake, alert, oriented to person, place and time.  Conversant without evidence of an affective disorder, thought disorder, delusions or hallucinations.  Attention span and concentration are normal.  HCF: Occasional expressive issues, minor degree of apraxia but no dysarthria.  Clears his throat multiple times during the evaluation.  Recent and remote memory intact.  Knowledgeof recent events intact.  CN: I:   II: Visual fields full without left inattention   III, IV, VI: Eye movements intact without nystagmus or ptosis.  Pupils equal  round and reactive to light.   V,VII: Light touch and pinprick intact all 3 divisions of V.  Facial muscles symmetrical.   VIII: Hearing intact to finger rub   IX,X: Soft palate elevates symmetrically   XI: Sternomastoid and trapezius are strong.   XII: Tongue midline without atrophy or fasciculations  Motor: Normal tone and bulk in the upper and lower extremities   Power testing: Mild weakness of the intrinsic hand muscles of the right hand  Reflexes: Upper extremities: Symmetric +1        Lower extremities: Symmetric +1        Toe signs:  Sensory: Light touch: Minor reduction right hand and leg        Pinprick:        Vibration:        Position:    Cerebellar: Finger-to-nose: Slightly slowed on the right           Rapid movement: Intact bilaterally           Heel-to-shin:  Gait and Station: Mildly right hemiparetic gait.  Mild athetoid right hand postures while ambulating    Results:      Lab Results "   Component Value Date    GLUCOSE 102 (H) 12/21/2021    BUN 12 12/21/2021    CREATININE 1.00 12/21/2021    EGFRIFNONA 88 12/21/2021    EGFRIFAFRI 102 12/21/2021    BCR 12 12/21/2021    CO2 23 12/21/2021    CALCIUM 9.3 12/21/2021    PROTENTOTREF 6.4 12/21/2021    ALBUMIN 4.4 12/21/2021    LABIL2 2.2 12/21/2021    AST 25 12/21/2021    ALT 29 12/21/2021       Lab Results   Component Value Date    WBC 7.4 12/21/2021    HGB 14.4 12/21/2021    HCT 43.8 12/21/2021    MCV 83 12/21/2021     12/21/2021         .No results found for: RPR      Lab Results   Component Value Date    TSH 2.290 12/21/2021         No results found for: [id number]      No results found for: FOLATE      No results found for: HGBA1C      Lab Results   Component Value Date    GLUCOSE 102 (H) 12/21/2021    BUN 12 12/21/2021    CREATININE 1.00 12/21/2021    EGFRIFNONA 88 12/21/2021    EGFRIFAFRI 102 12/21/2021    BCR 12 12/21/2021    K 4.4 12/21/2021    CO2 23 12/21/2021    CALCIUM 9.3 12/21/2021    PROTENTOTREF 6.4 12/21/2021    ALBUMIN 4.4 12/21/2021    LABIL2 2.2 12/21/2021    AST 25 12/21/2021    ALT 29 12/21/2021         Lab Results   Component Value Date    WBC 7.4 12/21/2021    HGB 14.4 12/21/2021    HCT 43.8 12/21/2021    MCV 83 12/21/2021     12/21/2021             Assessment:   1.  Hemorrhagic stroke sequelae-no change and no recurrent TIA or strokelike symptoms.  2.  Hyper CK emia  3.  Significant hyperlipidemia with statin induced severe elevation of CK history  4.  Chronic cough some of which is productive in some of which is not-I suspect it is a side effect of his lisinopril.  He does not have any other features that go along with a tic disorder although it is not entirely excluded as a source.          Plan:  1.  Continue efforts for control of risk factors with blood pressure less than 130/80.  We are handicapped regarding trying to control his lipids since he has been refused Repatha by insurance and by the [facility].  As I have  mentioned previously I failed to understand why a patient with symptomatic hyperlipidemia with history of cerebrovascular disease would not be a candidate for treatment with Repatha when he is unable to take statins because of myositis on top of his hyper CK emia.  2.  I asked him to discuss his lipid issue again with Dr. Bradford and I would be happy to write a letter in favor of use of Repatha if it would make any difference.  3.  I also asked him to discuss his cough with Dr. Bradford given I suspect it is due to the lisinopril.  The patient unfortunately has had difficulties with blood pressure control and since it is controlled well now it is a bit harder to simply make other changes.  4.  Follow-up in 1 year      Time: 30 minutes                    Dictated utilizing Dragon dictation.

## 2022-04-28 ENCOUNTER — APPOINTMENT (OUTPATIENT)
Dept: GENERAL RADIOLOGY | Facility: HOSPITAL | Age: 52
End: 2022-04-28

## 2022-04-28 ENCOUNTER — HOSPITAL ENCOUNTER (OUTPATIENT)
Facility: HOSPITAL | Age: 52
Setting detail: OBSERVATION
Discharge: HOME | End: 2022-04-29
Attending: EMERGENCY MEDICINE | Admitting: EMERGENCY MEDICINE

## 2022-04-28 ENCOUNTER — APPOINTMENT (OUTPATIENT)
Dept: MRI IMAGING | Facility: HOSPITAL | Age: 52
End: 2022-04-28

## 2022-04-28 ENCOUNTER — APPOINTMENT (OUTPATIENT)
Dept: CT IMAGING | Facility: HOSPITAL | Age: 52
End: 2022-04-28

## 2022-04-28 DIAGNOSIS — R56.9 NEW ONSET SEIZURE: Primary | ICD-10-CM

## 2022-04-28 LAB
ALBUMIN SERPL-MCNC: 4.6 G/DL (ref 3.5–5.2)
ALBUMIN/GLOB SERPL: 1.7 G/DL
ALP SERPL-CCNC: 67 U/L (ref 39–117)
ALT SERPL W P-5'-P-CCNC: 27 U/L (ref 1–41)
AMPHET+METHAMPHET UR QL: NEGATIVE
ANION GAP SERPL CALCULATED.3IONS-SCNC: 17.2 MMOL/L (ref 5–15)
APAP SERPL-MCNC: <5 MCG/ML (ref 0–30)
AST SERPL-CCNC: 23 U/L (ref 1–40)
BARBITURATES UR QL SCN: NEGATIVE
BASOPHILS # BLD AUTO: 0.05 10*3/MM3 (ref 0–0.2)
BASOPHILS NFR BLD AUTO: 0.7 % (ref 0–1.5)
BENZODIAZ UR QL SCN: NEGATIVE
BILIRUB SERPL-MCNC: 0.4 MG/DL (ref 0–1.2)
BILIRUB UR QL STRIP: NEGATIVE
BUN SERPL-MCNC: 12 MG/DL (ref 6–20)
BUN/CREAT SERPL: 11 (ref 7–25)
CALCIUM SPEC-SCNC: 9.1 MG/DL (ref 8.6–10.5)
CANNABINOIDS SERPL QL: NEGATIVE
CHLORIDE SERPL-SCNC: 101 MMOL/L (ref 98–107)
CLARITY UR: CLEAR
CO2 SERPL-SCNC: 16.8 MMOL/L (ref 22–29)
COCAINE UR QL: NEGATIVE
COLOR UR: YELLOW
CREAT SERPL-MCNC: 1.09 MG/DL (ref 0.76–1.27)
DEPRECATED RDW RBC AUTO: 38.9 FL (ref 37–54)
EGFRCR SERPLBLD CKD-EPI 2021: 83.2 ML/MIN/1.73
EOSINOPHIL # BLD AUTO: 0.15 10*3/MM3 (ref 0–0.4)
EOSINOPHIL NFR BLD AUTO: 2.1 % (ref 0.3–6.2)
ERYTHROCYTE [DISTWIDTH] IN BLOOD BY AUTOMATED COUNT: 12.8 % (ref 12.3–15.4)
ETHANOL BLD-MCNC: <10 MG/DL (ref 0–10)
ETHANOL UR QL: <0.01 %
GLOBULIN UR ELPH-MCNC: 2.7 GM/DL
GLUCOSE SERPL-MCNC: 119 MG/DL (ref 65–99)
GLUCOSE UR STRIP-MCNC: NEGATIVE MG/DL
HCT VFR BLD AUTO: 45.8 % (ref 37.5–51)
HGB BLD-MCNC: 14.9 G/DL (ref 13–17.7)
HGB UR QL STRIP.AUTO: NEGATIVE
IMM GRANULOCYTES # BLD AUTO: 0.03 10*3/MM3 (ref 0–0.05)
IMM GRANULOCYTES NFR BLD AUTO: 0.4 % (ref 0–0.5)
KETONES UR QL STRIP: ABNORMAL
LEUKOCYTE ESTERASE UR QL STRIP.AUTO: NEGATIVE
LYMPHOCYTES # BLD AUTO: 1.43 10*3/MM3 (ref 0.7–3.1)
LYMPHOCYTES NFR BLD AUTO: 20.1 % (ref 19.6–45.3)
MAGNESIUM SERPL-MCNC: 2.4 MG/DL (ref 1.6–2.6)
MCH RBC QN AUTO: 27 PG (ref 26.6–33)
MCHC RBC AUTO-ENTMCNC: 32.5 G/DL (ref 31.5–35.7)
MCV RBC AUTO: 83.1 FL (ref 79–97)
METHADONE UR QL SCN: NEGATIVE
MONOCYTES # BLD AUTO: 0.64 10*3/MM3 (ref 0.1–0.9)
MONOCYTES NFR BLD AUTO: 9 % (ref 5–12)
NEUTROPHILS NFR BLD AUTO: 4.82 10*3/MM3 (ref 1.7–7)
NEUTROPHILS NFR BLD AUTO: 67.7 % (ref 42.7–76)
NITRITE UR QL STRIP: NEGATIVE
NRBC BLD AUTO-RTO: 0 /100 WBC (ref 0–0.2)
NT-PROBNP SERPL-MCNC: 56.1 PG/ML (ref 0–450)
OPIATES UR QL: NEGATIVE
OXYCODONE UR QL SCN: NEGATIVE
PH UR STRIP.AUTO: <=5 [PH] (ref 5–8)
PHOSPHATE SERPL-MCNC: 2.9 MG/DL (ref 2.5–4.5)
PLATELET # BLD AUTO: 246 10*3/MM3 (ref 140–450)
PMV BLD AUTO: 9.7 FL (ref 6–12)
POTASSIUM SERPL-SCNC: 4.4 MMOL/L (ref 3.5–5.2)
PROT SERPL-MCNC: 7.3 G/DL (ref 6–8.5)
PROT UR QL STRIP: NEGATIVE
QT INTERVAL: 442 MS
RBC # BLD AUTO: 5.51 10*6/MM3 (ref 4.14–5.8)
SALICYLATES SERPL-MCNC: <0.3 MG/DL
SARS-COV-2 RNA RESP QL NAA+PROBE: NOT DETECTED
SODIUM SERPL-SCNC: 135 MMOL/L (ref 136–145)
SP GR UR STRIP: 1.02 (ref 1–1.03)
T4 FREE SERPL-MCNC: 1.25 NG/DL (ref 0.93–1.7)
TROPONIN T SERPL-MCNC: 0.02 NG/ML (ref 0–0.03)
TSH SERPL DL<=0.05 MIU/L-ACNC: 2.13 UIU/ML (ref 0.27–4.2)
UROBILINOGEN UR QL STRIP: ABNORMAL
WBC NRBC COR # BLD: 7.12 10*3/MM3 (ref 3.4–10.8)

## 2022-04-28 PROCEDURE — G0378 HOSPITAL OBSERVATION PER HR: HCPCS

## 2022-04-28 PROCEDURE — 25010000002 LORAZEPAM PER 2 MG: Performed by: EMERGENCY MEDICINE

## 2022-04-28 PROCEDURE — U0005 INFEC AGEN DETEC AMPLI PROBE: HCPCS | Performed by: EMERGENCY MEDICINE

## 2022-04-28 PROCEDURE — 80307 DRUG TEST PRSMV CHEM ANLYZR: CPT | Performed by: EMERGENCY MEDICINE

## 2022-04-28 PROCEDURE — 83880 ASSAY OF NATRIURETIC PEPTIDE: CPT | Performed by: EMERGENCY MEDICINE

## 2022-04-28 PROCEDURE — 80179 DRUG ASSAY SALICYLATE: CPT | Performed by: EMERGENCY MEDICINE

## 2022-04-28 PROCEDURE — A9577 INJ MULTIHANCE: HCPCS | Performed by: EMERGENCY MEDICINE

## 2022-04-28 PROCEDURE — 93005 ELECTROCARDIOGRAM TRACING: CPT | Performed by: EMERGENCY MEDICINE

## 2022-04-28 PROCEDURE — 70553 MRI BRAIN STEM W/O & W/DYE: CPT

## 2022-04-28 PROCEDURE — 25010000002 LEVETRIRACETAM PER 10 MG: Performed by: EMERGENCY MEDICINE

## 2022-04-28 PROCEDURE — 84100 ASSAY OF PHOSPHORUS: CPT | Performed by: EMERGENCY MEDICINE

## 2022-04-28 PROCEDURE — 83735 ASSAY OF MAGNESIUM: CPT | Performed by: EMERGENCY MEDICINE

## 2022-04-28 PROCEDURE — 71045 X-RAY EXAM CHEST 1 VIEW: CPT

## 2022-04-28 PROCEDURE — 84439 ASSAY OF FREE THYROXINE: CPT | Performed by: EMERGENCY MEDICINE

## 2022-04-28 PROCEDURE — 82077 ASSAY SPEC XCP UR&BREATH IA: CPT | Performed by: EMERGENCY MEDICINE

## 2022-04-28 PROCEDURE — 81003 URINALYSIS AUTO W/O SCOPE: CPT | Performed by: EMERGENCY MEDICINE

## 2022-04-28 PROCEDURE — 93010 ELECTROCARDIOGRAM REPORT: CPT | Performed by: INTERNAL MEDICINE

## 2022-04-28 PROCEDURE — 85025 COMPLETE CBC W/AUTO DIFF WBC: CPT | Performed by: EMERGENCY MEDICINE

## 2022-04-28 PROCEDURE — 80053 COMPREHEN METABOLIC PANEL: CPT | Performed by: EMERGENCY MEDICINE

## 2022-04-28 PROCEDURE — 99285 EMERGENCY DEPT VISIT HI MDM: CPT

## 2022-04-28 PROCEDURE — 84484 ASSAY OF TROPONIN QUANT: CPT | Performed by: EMERGENCY MEDICINE

## 2022-04-28 PROCEDURE — 84443 ASSAY THYROID STIM HORMONE: CPT | Performed by: EMERGENCY MEDICINE

## 2022-04-28 PROCEDURE — 0 GADOBENATE DIMEGLUMINE 529 MG/ML SOLUTION: Performed by: EMERGENCY MEDICINE

## 2022-04-28 PROCEDURE — C9803 HOPD COVID-19 SPEC COLLECT: HCPCS

## 2022-04-28 PROCEDURE — 80143 DRUG ASSAY ACETAMINOPHEN: CPT | Performed by: EMERGENCY MEDICINE

## 2022-04-28 PROCEDURE — 70450 CT HEAD/BRAIN W/O DYE: CPT

## 2022-04-28 PROCEDURE — U0003 INFECTIOUS AGENT DETECTION BY NUCLEIC ACID (DNA OR RNA); SEVERE ACUTE RESPIRATORY SYNDROME CORONAVIRUS 2 (SARS-COV-2) (CORONAVIRUS DISEASE [COVID-19]), AMPLIFIED PROBE TECHNIQUE, MAKING USE OF HIGH THROUGHPUT TECHNOLOGIES AS DESCRIBED BY CMS-2020-01-R: HCPCS | Performed by: EMERGENCY MEDICINE

## 2022-04-28 RX ORDER — LORAZEPAM 2 MG/ML
1 INJECTION INTRAMUSCULAR ONCE
Status: COMPLETED | OUTPATIENT
Start: 2022-04-28 | End: 2022-04-28

## 2022-04-28 RX ORDER — LEVETIRACETAM 500 MG/5ML
1000 INJECTION, SOLUTION, CONCENTRATE INTRAVENOUS ONCE
Status: COMPLETED | OUTPATIENT
Start: 2022-04-28 | End: 2022-04-28

## 2022-04-28 RX ORDER — LEVOCETIRIZINE DIHYDROCHLORIDE 5 MG/1
5 TABLET, FILM COATED ORAL EVERY EVENING
COMMUNITY

## 2022-04-28 RX ORDER — CHOLECALCIFEROL (VITAMIN D3) 125 MCG
5 CAPSULE ORAL NIGHTLY PRN
Status: DISCONTINUED | OUTPATIENT
Start: 2022-04-28 | End: 2022-04-29 | Stop reason: HOSPADM

## 2022-04-28 RX ORDER — LEVETIRACETAM 500 MG/1
1000 TABLET ORAL 2 TIMES DAILY
Status: DISCONTINUED | OUTPATIENT
Start: 2022-04-29 | End: 2022-04-29 | Stop reason: HOSPADM

## 2022-04-28 RX ORDER — LISINOPRIL 20 MG/1
20 TABLET ORAL DAILY
Status: DISCONTINUED | OUTPATIENT
Start: 2022-04-29 | End: 2022-04-29 | Stop reason: HOSPADM

## 2022-04-28 RX ORDER — SODIUM CHLORIDE 0.9 % (FLUSH) 0.9 %
10 SYRINGE (ML) INJECTION AS NEEDED
Status: DISCONTINUED | OUTPATIENT
Start: 2022-04-28 | End: 2022-04-29 | Stop reason: HOSPADM

## 2022-04-28 RX ORDER — LABETALOL 200 MG/1
100 TABLET, FILM COATED ORAL 3 TIMES DAILY
Status: DISCONTINUED | OUTPATIENT
Start: 2022-04-28 | End: 2022-04-29 | Stop reason: HOSPADM

## 2022-04-28 RX ORDER — ASPIRIN 81 MG/1
81 TABLET ORAL DAILY
Status: DISCONTINUED | OUTPATIENT
Start: 2022-04-29 | End: 2022-04-29 | Stop reason: HOSPADM

## 2022-04-28 RX ORDER — NITROGLYCERIN 0.4 MG/1
0.4 TABLET SUBLINGUAL
Status: DISCONTINUED | OUTPATIENT
Start: 2022-04-28 | End: 2022-04-29 | Stop reason: HOSPADM

## 2022-04-28 RX ORDER — SPIRONOLACTONE 25 MG/1
25 TABLET ORAL DAILY
Status: DISCONTINUED | OUTPATIENT
Start: 2022-04-29 | End: 2022-04-29 | Stop reason: HOSPADM

## 2022-04-28 RX ORDER — PANTOPRAZOLE SODIUM 40 MG/1
40 TABLET, DELAYED RELEASE ORAL EVERY MORNING
Status: DISCONTINUED | OUTPATIENT
Start: 2022-04-29 | End: 2022-04-29 | Stop reason: HOSPADM

## 2022-04-28 RX ORDER — ONDANSETRON 2 MG/ML
4 INJECTION INTRAMUSCULAR; INTRAVENOUS EVERY 6 HOURS PRN
Status: DISCONTINUED | OUTPATIENT
Start: 2022-04-28 | End: 2022-04-29 | Stop reason: HOSPADM

## 2022-04-28 RX ORDER — ONDANSETRON 4 MG/1
4 TABLET, FILM COATED ORAL EVERY 6 HOURS PRN
Status: DISCONTINUED | OUTPATIENT
Start: 2022-04-28 | End: 2022-04-29 | Stop reason: HOSPADM

## 2022-04-28 RX ORDER — CETIRIZINE HYDROCHLORIDE 10 MG/1
10 TABLET ORAL DAILY
Status: DISCONTINUED | OUTPATIENT
Start: 2022-04-29 | End: 2022-04-29 | Stop reason: HOSPADM

## 2022-04-28 RX ORDER — SODIUM CHLORIDE 0.9 % (FLUSH) 0.9 %
10 SYRINGE (ML) INJECTION EVERY 12 HOURS SCHEDULED
Status: DISCONTINUED | OUTPATIENT
Start: 2022-04-28 | End: 2022-04-29 | Stop reason: HOSPADM

## 2022-04-28 RX ORDER — ACETAMINOPHEN 325 MG/1
650 TABLET ORAL EVERY 4 HOURS PRN
Status: DISCONTINUED | OUTPATIENT
Start: 2022-04-28 | End: 2022-04-29 | Stop reason: HOSPADM

## 2022-04-28 RX ORDER — AMLODIPINE BESYLATE 10 MG/1
10 TABLET ORAL NIGHTLY
Status: DISCONTINUED | OUTPATIENT
Start: 2022-04-28 | End: 2022-04-29 | Stop reason: HOSPADM

## 2022-04-28 RX ADMIN — LABETALOL HYDROCHLORIDE 100 MG: 200 TABLET, FILM COATED ORAL at 23:37

## 2022-04-28 RX ADMIN — LORAZEPAM 1 MG: 2 INJECTION INTRAMUSCULAR; INTRAVENOUS at 22:23

## 2022-04-28 RX ADMIN — AMLODIPINE BESYLATE 10 MG: 10 TABLET ORAL at 23:37

## 2022-04-28 RX ADMIN — Medication 10 ML: at 23:38

## 2022-04-28 RX ADMIN — LEVETIRACETAM 1000 MG: 100 INJECTION INTRAVENOUS at 23:38

## 2022-04-28 RX ADMIN — GADOBENATE DIMEGLUMINE 20 ML: 529 INJECTION, SOLUTION INTRAVENOUS at 22:55

## 2022-04-29 ENCOUNTER — APPOINTMENT (OUTPATIENT)
Dept: NEUROLOGY | Facility: HOSPITAL | Age: 52
End: 2022-04-29

## 2022-04-29 ENCOUNTER — READMISSION MANAGEMENT (OUTPATIENT)
Dept: CALL CENTER | Facility: HOSPITAL | Age: 52
End: 2022-04-29

## 2022-04-29 VITALS
HEART RATE: 71 BPM | HEIGHT: 69 IN | DIASTOLIC BLOOD PRESSURE: 78 MMHG | SYSTOLIC BLOOD PRESSURE: 107 MMHG | RESPIRATION RATE: 16 BRPM | BODY MASS INDEX: 36.88 KG/M2 | OXYGEN SATURATION: 97 % | TEMPERATURE: 98.2 F | WEIGHT: 249 LBS

## 2022-04-29 LAB
ANION GAP SERPL CALCULATED.3IONS-SCNC: 12.6 MMOL/L (ref 5–15)
BUN SERPL-MCNC: 11 MG/DL (ref 6–20)
BUN/CREAT SERPL: 11.3 (ref 7–25)
CALCIUM SPEC-SCNC: 8.9 MG/DL (ref 8.6–10.5)
CHLORIDE SERPL-SCNC: 101 MMOL/L (ref 98–107)
CO2 SERPL-SCNC: 23.4 MMOL/L (ref 22–29)
CREAT SERPL-MCNC: 0.97 MG/DL (ref 0.76–1.27)
DEPRECATED RDW RBC AUTO: 37 FL (ref 37–54)
EGFRCR SERPLBLD CKD-EPI 2021: 95.7 ML/MIN/1.73
ERYTHROCYTE [DISTWIDTH] IN BLOOD BY AUTOMATED COUNT: 12.7 % (ref 12.3–15.4)
GLUCOSE SERPL-MCNC: 99 MG/DL (ref 65–99)
HCT VFR BLD AUTO: 42.9 % (ref 37.5–51)
HGB BLD-MCNC: 14.5 G/DL (ref 13–17.7)
MCH RBC QN AUTO: 27.4 PG (ref 26.6–33)
MCHC RBC AUTO-ENTMCNC: 33.8 G/DL (ref 31.5–35.7)
MCV RBC AUTO: 80.9 FL (ref 79–97)
PLATELET # BLD AUTO: 234 10*3/MM3 (ref 140–450)
PMV BLD AUTO: 9.8 FL (ref 6–12)
POTASSIUM SERPL-SCNC: 3.8 MMOL/L (ref 3.5–5.2)
RBC # BLD AUTO: 5.3 10*6/MM3 (ref 4.14–5.8)
SODIUM SERPL-SCNC: 137 MMOL/L (ref 136–145)
WBC NRBC COR # BLD: 9.53 10*3/MM3 (ref 3.4–10.8)

## 2022-04-29 PROCEDURE — 85027 COMPLETE CBC AUTOMATED: CPT | Performed by: PHYSICIAN ASSISTANT

## 2022-04-29 PROCEDURE — G0378 HOSPITAL OBSERVATION PER HR: HCPCS

## 2022-04-29 PROCEDURE — 80048 BASIC METABOLIC PNL TOTAL CA: CPT | Performed by: PHYSICIAN ASSISTANT

## 2022-04-29 PROCEDURE — 99214 OFFICE O/P EST MOD 30 MIN: CPT | Performed by: NURSE PRACTITIONER

## 2022-04-29 PROCEDURE — 95816 EEG AWAKE AND DROWSY: CPT

## 2022-04-29 PROCEDURE — 95816 EEG AWAKE AND DROWSY: CPT | Performed by: STUDENT IN AN ORGANIZED HEALTH CARE EDUCATION/TRAINING PROGRAM

## 2022-04-29 RX ORDER — LEVETIRACETAM 1000 MG/1
1000 TABLET ORAL 2 TIMES DAILY
Qty: 60 TABLET | Refills: 99 | Status: SHIPPED | OUTPATIENT
Start: 2022-04-29 | End: 2022-06-10 | Stop reason: SDUPTHER

## 2022-04-29 RX ADMIN — PANTOPRAZOLE SODIUM 40 MG: 40 TABLET, DELAYED RELEASE ORAL at 10:26

## 2022-04-29 RX ADMIN — LISINOPRIL 20 MG: 20 TABLET ORAL at 10:27

## 2022-04-29 RX ADMIN — Medication 10 ML: at 10:27

## 2022-04-29 RX ADMIN — SPIRONOLACTONE 25 MG: 25 TABLET, FILM COATED ORAL at 10:28

## 2022-04-29 RX ADMIN — LEVETIRACETAM 1000 MG: 500 TABLET, FILM COATED ORAL at 10:27

## 2022-04-29 RX ADMIN — LABETALOL HYDROCHLORIDE 100 MG: 200 TABLET, FILM COATED ORAL at 10:26

## 2022-04-29 NOTE — OUTREACH NOTE
Prep Survey    Flowsheet Row Responses   [facility] facility patient discharged from? [facility]   Is LACE score < 7 ? Yes   Emergency Room discharge w/ pulse ox? No   Eligibility [facility]   Date of Admission 04/28/22   Date of Discharge 04/29/22   Discharge Disposition Home or Self Care   Discharge diagnosis seizure like activity, new onset seizure likely d/t previous hemorrhagic CVA, HTN   Does the patient have one of the following disease processes/diagnoses(primary or secondary)? Other   Does the patient have Home health ordered? No   Is there a DME ordered? No   Prep survey completed? Yes          SUMAN KENNEY - Registered Nurse

## 2022-05-02 ENCOUNTER — TRANSITIONAL CARE MANAGEMENT TELEPHONE ENCOUNTER (OUTPATIENT)
Dept: CALL CENTER | Facility: HOSPITAL | Age: 52
End: 2022-05-02

## 2022-05-02 NOTE — OUTREACH NOTE
Call Center TCM Note    Flowsheet Row Responses   [facility] patient discharged from? [place]   Does the patient have one of the following disease processes/diagnoses(primary or secondary)? Other   TCM attempt successful? Yes   Discharge diagnosis seizure like activity, new onset seizure likely d/t previous hemorrhagic CVA, HTN   Meds reviewed with patient/caregiver? Yes   Is the patient having any side effects they believe may be caused by any medication additions or changes? No   Does the patient have all medications ordered at discharge? Yes   Is the patient taking all medications as directed (includes completed medication regime)? Yes   Does the patient have a primary care provider?  Yes   Does the patient have an appointment with their PCP within 7 days of discharge? No   Comments regarding PCP Pt is sched in June 2022 with PCP Rebecca Nixon and declines sooner appt.    Has the patient kept scheduled appointments due by today? N/A   Has home health visited the patient within 72 hours of discharge? N/A   Psychosocial issues? No   Did the patient receive a copy of their discharge instructions? Yes   Nursing interventions Reviewed instructions with patient   What is the patient's perception of their health status since discharge? Improving   Is the patient/caregiver able to teach back signs and symptoms related to disease process for when to call PCP? Yes   Is the patient/caregiver able to teach back signs and symptoms related to disease process for when to call 911? Yes   Is the patient/caregiver able to teach back the hierarchy of who to call/visit for symptoms/problems? PCP, Specialist, Home health nurse, Urgent Care, ED, 911 Yes   If the patient is a current smoker, are they able to teach back resources for cessation? Not a smoker   TCM call completed? Yes   Wrap up additional comments Pt doing ok s/p new onset seizures. New rx Keppra in place. No questions at this time. Pt has sched appt with  Neurologist. Pt is sched in June 2022 with PCP Rebecca Nixon and declines sooner appt.           Christa Rouse MA    5/2/2022, 16:28 EDT

## 2022-05-02 NOTE — OUTREACH NOTE
Call Center TCM Note    Flowsheet Row Responses   [facility] patient discharged from? [place]   Does the patient have one of the following disease processes/diagnoses(primary or secondary)? Other   TCM attempt successful? No   Unsuccessful attempts Attempt 1   Call Status Left message          Christa Rouse MA    5/2/2022, 14:19 EDT

## 2022-05-24 RX ORDER — LABETALOL 100 MG/1
TABLET, FILM COATED ORAL
Qty: 90 TABLET | Refills: 0 | Status: SHIPPED | OUTPATIENT
Start: 2022-05-24 | End: 2022-07-19

## 2022-06-08 DIAGNOSIS — I10 ESSENTIAL HYPERTENSION: ICD-10-CM

## 2022-06-08 RX ORDER — SPIRONOLACTONE 25 MG/1
TABLET ORAL
Qty: 90 TABLET | Refills: 1 | Status: SHIPPED | OUTPATIENT
Start: 2022-06-08 | End: 2022-11-01

## 2022-06-08 RX ORDER — LISINOPRIL 20 MG/1
TABLET ORAL
Qty: 90 TABLET | Refills: 1 | Status: SHIPPED | OUTPATIENT
Start: 2022-06-08 | End: 2022-12-05

## 2022-06-08 NOTE — PROGRESS NOTES
"CC: New onset seizure; history of hemorrhagic stroke; hyper CK emia    HPI:  Gaurav Mora is a  51 y.o.  right-handed white male who I am seeing on follow-up with new onset seizure who I have been following post hemorrhagic stroke, left MCA territory and hyper CK emia.  I saw the patient last 4/1/2022 and a routine follow-up.  He has hypertension treated with 4 drugs and hyperlipidemia who had notable elevation of CK on a statin which did not fully resolve on stopping the statin.  He was evaluated by Dr. Jessie Garner for an inflammatory myopathy but muscle biopsy did not demonstrate evidence of inflammation.  In my opinion he is not a candidate for statins and I recommended a drug such as Repatha to further discuss with his cardiologist Dr. Bradford.  He is on aspirin prophylactically.    The patient was admitted then seen by Bailey Ward NP of neurology 4/29/2022 for new onset seizure.  The patient states that he felt dizzy then blacked out and woke up in the ambulance.  He had a convulsion.  No previously described seizures.  He was placed on Keppra 1000 mg twice daily.  There has been some noticeable changes such as daytime drowsiness and feeling dizzy a couple of times daily.  The dizziness is typically when he is on his feet but can occur if he is sitting.  It does not occur if he lays down or rolls over.  He has also been a bit more \"mcneal\" according to his father and stepmother.  Of interest his stepmother recorded an episode which he had been having occasionally where his arms have some stereotypic movements on both sides and he does not respond to voice nor talk.  These episodes preceded his seizure but I was not made aware of them when he came for follow-ups with his father.  The patient is not fully able to identify these episodes.    The patient has obstructive sleep apnea and uses CPAP.  This apparently is being reevaluated.        Past Medical History:   Diagnosis Date   • Elevated CK  "   • Environmental allergies    • Headache, tension-type    • Hemorrhagic cerebrovascular accident (CVA) (HCC)    • Hemorrhagic stroke (HCC) 11/24/2017    Residual peripheral vision loss, right-sided sensation loss   • History of bladder infections 12/2017   • Hyperlipidemia    • Hypertension    • Lung nodule    • New onset seizure (HCC) 04/28/2022   • SHANE on CPAP     WEARS CPAP   • PONV (postoperative nausea and vomiting)    • Seizure (HCC) 04/28/2022   • Stroke (HCC) 2017         Past Surgical History:   Procedure Laterality Date   • APPENDECTOMY N/A 1987   • COLONOSCOPY N/A 02/17/2022    Procedure: COLONOSCOPY into  cecum with cold bx polypectomy;  Surgeon: Jeronimo Paul Jr., MD;  Location: [facility] ENDOSCOPY;  Service: General;  Laterality: N/A;  pre: screen  post: polyp    • MUSCLE BIOPSY Left 08/19/2020    Procedure: quadriceps muscle biopsy;  Surgeon: Paulina Nina MD;  Location: [facility] MAIN OR;  Service: General;  Laterality: Left;           Current Outpatient Medications:   •  acetaminophen (TYLENOL) 325 MG tablet, Take 650 mg by mouth Every 4 (Four) Hours As Needed for Mild Pain ., Disp: , Rfl:   •  amLODIPine (NORVASC) 10 MG tablet, TAKE 1 TABLET DAILY (Patient taking differently: 10 mg Every Night. TAKE 1 TABLET DAILY), Disp: 90 tablet, Rfl: 3  •  aspirin 81 MG EC tablet, Take 1 tablet by mouth Daily., Disp: , Rfl:   •  Azelastine-Fluticasone 137-50 MCG/ACT suspension, 2 sprays into the nostril(s) as directed by provider As Needed., Disp: , Rfl:   •  clobetasol (CLOBEX) 0.05 % lotion, , Disp: , Rfl:   •  ketoconazole (NIZORAL) 2 % cream, , Disp: , Rfl:   •  labetalol (NORMODYNE) 100 MG tablet, TAKE 1 TABLET THREE TIMES A DAY, Disp: 90 tablet, Rfl: 0  •  levETIRAcetam (KEPPRA) 1000 MG tablet, Take 1 tablet by mouth 2 (Two) Times a Day., Disp: 180 tablet, Rfl: 3  •  levocetirizine (XYZAL) 5 MG tablet, Take 5 mg by mouth Every Evening., Disp: , Rfl:   •  lisinopril (PRINIVIL,ZESTRIL) 20 MG tablet, TAKE 1  TABLET DAILY, Disp: 90 tablet, Rfl: 1  •  omeprazole (priLOSEC) 20 MG capsule, Take 20 mg by mouth Daily., Disp: , Rfl:   •  spironolactone (ALDACTONE) 25 MG tablet, TAKE 1 TABLET DAILY, Disp: 90 tablet, Rfl: 1  •  triamcinolone (KENALOG) 0.1 % cream, , Disp: , Rfl:   •  vitamin D (ERGOCALCIFEROL) 1.25 MG (02968 UT) capsule capsule, TAKE ONE CAPSULE BY MOUTH EVERY 7 DAYS (Patient taking differently: Take 50,000 Units by mouth Every 7 (Seven) Days.), Disp: 13 capsule, Rfl: 1  •  miconazole (Lotrimin AF) 2 % powder, Apply  topically to the appropriate area as directed 2 (two) times a day., Disp: 71 g, Rfl: 1  •  montelukast (SINGULAIR) 10 MG tablet, , Disp: , Rfl:       Family History   Problem Relation Age of Onset   • Breast cancer Mother    • Atrial fibrillation Father    • Hypertension Father    • Skin cancer Father    • Colon cancer Paternal Grandmother    • Heart disease Maternal Grandfather    • Parkinsonism Maternal Grandfather    • Heart disease Maternal Aunt    • Heart disease Maternal Uncle    • Malig Hyperthermia Neg Hx          Social History     Socioeconomic History   • Marital status: Single   • Number of children: 0   Tobacco Use   • Smoking status: Never Smoker   • Smokeless tobacco: Never Used   • Tobacco comment: minimal caffeine   Vaping Use   • Vaping Use: Never used   Substance and Sexual Activity   • Alcohol use: Not Currently     Alcohol/week: 1.0 standard drink     Types: 1 Cans of beer per week     Comment: This is rare.   • Drug use: Never   • Sexual activity: Never         No Known Allergies      Pain Scale: 0/10      ROS:  Review of Systems   Constitutional: Positive for fatigue. Negative for activity change, appetite change and unexpected weight change.   HENT: Negative for ear pain, facial swelling, nosebleeds, tinnitus and trouble swallowing.    Eyes: Negative for photophobia, pain and visual disturbance.   Respiratory: Negative for choking, chest tightness, shortness of breath and  "wheezing.    Cardiovascular: Negative for chest pain, palpitations and leg swelling.   Musculoskeletal: Positive for gait problem (off balance). Negative for joint swelling, neck pain and neck stiffness.   Neurological: Positive for dizziness, seizures and light-headedness. Negative for facial asymmetry, speech difficulty and headaches.   Psychiatric/Behavioral: Negative for confusion, decreased concentration and sleep disturbance. The patient is not nervous/anxious.          I have reviewed and agree with the above ROS completed by the medical assistant.      Physical Exam:  Vitals:    06/10/22 1116   BP: 110/70   Pulse: 79   SpO2: 98%   Weight: 114 kg (252 lb 3.2 oz)   Height: 175.3 cm (69\")     Orthostatic BP: Standing blood pressure was 110/80 in the left arm    Body mass index is 37.24 kg/m².    Physical Exam  General: Obese muscular white male no acute distress  HEENT: Normocephalic no evidence of trauma  Neck: Supple  Heart: Regular rate and rhythm  Extremities: No pedal edema      Neurological Exam:   Mental Status: Awake, alert, oriented to person, place and time.  Conversant without evidence of an affective disorder, thought disorder, delusions or hallucinations.  Attention span and concentration are normal.  HCF: Occasional word finding and some degree of dyspraxia.  No dysarthria.  Memory generally intact.  Knowledge of recent events intact.  CN: I:   II: Visual fields full without left inattention   III, IV, VI: Eye movements intact without nystagmus or ptosis.  Pupils equal  round and reactive to light.   V,VII: Light touch and pinprick intact all 3 divisions of V.  Facial muscles symmetrical.   VIII: Hearing intact to finger rub   IX,X: Soft palate elevates symmetrically   XI: Sternomastoid and trapezius are strong.   XII: Tongue midline without atrophy or fasciculations  Motor: Normal tone and bulk in the upper and lower extremities   Power testing: Mild weakness of intrinsic hand muscles in the " right hand.  Pretty good strength otherwise in the right arm.  Reflexes: Upper extremities:        Lower extremities:        Toe signs:  Sensory: Light touch: Reduced right side        Pinprick:        Vibration:        Position:    Cerebellar: Finger-to-nose: Intact           Rapid movement: Intact           Heel-to-shin:  Gait and Station: Comes to stand and hesitates slightly.  Mildly broad-based with occasional circumduction of the right leg.  Mild drift of the right hand    Results:      Lab Results   Component Value Date    GLUCOSE 99 04/29/2022    BUN 11 04/29/2022    CREATININE 0.97 04/29/2022    EGFRIFNONA 88 12/21/2021    EGFRIFAFRI 102 12/21/2021    BCR 11.3 04/29/2022    CO2 23.4 04/29/2022    CALCIUM 8.9 04/29/2022    PROTENTOTREF 6.4 12/21/2021    ALBUMIN 4.60 04/28/2022    LABIL2 2.2 12/21/2021    AST 23 04/28/2022    ALT 27 04/28/2022       Lab Results   Component Value Date    WBC 9.53 04/29/2022    HGB 14.5 04/29/2022    HCT 42.9 04/29/2022    MCV 80.9 04/29/2022     04/29/2022         .No results found for: RPR      Lab Results   Component Value Date    TSH 2.130 04/28/2022         No results found for: [id number]      No results found for: FOLATE      No results found for: HGBA1C      Lab Results   Component Value Date    GLUCOSE 99 04/29/2022    BUN 11 04/29/2022    CREATININE 0.97 04/29/2022    EGFRIFNONA 88 12/21/2021    EGFRIFAFRI 102 12/21/2021    BCR 11.3 04/29/2022    K 3.8 04/29/2022    CO2 23.4 04/29/2022    CALCIUM 8.9 04/29/2022    PROTENTOTREF 6.4 12/21/2021    ALBUMIN 4.60 04/28/2022    LABIL2 2.2 12/21/2021    AST 23 04/28/2022    ALT 27 04/28/2022         Lab Results   Component Value Date    WBC 9.53 04/29/2022    HGB 14.5 04/29/2022    HCT 42.9 04/29/2022    MCV 80.9 04/29/2022     04/29/2022             Assessment:   1.  New onset seizures, partial with secondary generalization and complex partial seizures in addition which I was not previously made aware of.  No  further seizures of any kind since being placed on Keppra but he seems to be having some side effects such as dizziness drowsiness and some degree of moodiness  2.  Status posthemorrhagic left hemisphere stroke left temporoparietal region  3.  SHANE        Plan:  1.  I recommend continuing Keppra 1000 mg twice daily for another 6 weeks and reconvene at that point to consider any change.  We reviewed multiple side effects potentially from epilepsy drugs and his symptoms are not exclusive to Keppra but are members of a larger group of symptoms which can be seen with any epilepsy drug.  Keppra however has a very low incidence of rash, liver dysfunction or blood cell abnormality which make it an excellent choice initially.  It is currently approved for monotherapy.  I would be concerned if we tried a lower dose since he is a fairly muscular young man and his volume of distribution is probably relatively large such that a lower dose may not achieve the same effect of 100% control of seizures.  2.  I reiterate my suggestion at use of Repatha or other related drug for his lipids.  Hopefully Dr. Bradford will have some luck getting this approved  3.  Follow-up with me in 6 weeks in a cancellation follow-up slot  4.  I advised him to occasionally check orthostatic blood pressures to see if low pressure may underlie some of his dizziness.  He was not orthostatic today but he is on 4 drugs for hypertension.        Time: 30 minutes                Dictated utilizing Dragon dictation.

## 2022-06-10 ENCOUNTER — OFFICE VISIT (OUTPATIENT)
Dept: NEUROLOGY | Facility: CLINIC | Age: 52
End: 2022-06-10

## 2022-06-10 VITALS
DIASTOLIC BLOOD PRESSURE: 70 MMHG | HEIGHT: 69 IN | HEART RATE: 79 BPM | WEIGHT: 252.2 LBS | OXYGEN SATURATION: 98 % | BODY MASS INDEX: 37.36 KG/M2 | SYSTOLIC BLOOD PRESSURE: 110 MMHG

## 2022-06-10 DIAGNOSIS — G40.209 PARTIAL SYMPTOMATIC EPILEPSY WITH COMPLEX PARTIAL SEIZURES, NOT INTRACTABLE, WITHOUT STATUS EPILEPTICUS: Primary | ICD-10-CM

## 2022-06-10 DIAGNOSIS — R56.9 SEIZURES: ICD-10-CM

## 2022-06-10 PROCEDURE — 99214 OFFICE O/P EST MOD 30 MIN: CPT | Performed by: PSYCHIATRY & NEUROLOGY

## 2022-06-10 RX ORDER — LEVETIRACETAM 1000 MG/1
1000 TABLET ORAL 2 TIMES DAILY
Qty: 180 TABLET | Refills: 3 | Status: SHIPPED | OUTPATIENT
Start: 2022-06-10 | End: 2022-07-22 | Stop reason: SINTOL

## 2022-06-17 ENCOUNTER — OFFICE VISIT (OUTPATIENT)
Dept: FAMILY MEDICINE CLINIC | Facility: CLINIC | Age: 52
End: 2022-06-17

## 2022-06-17 VITALS
HEART RATE: 68 BPM | SYSTOLIC BLOOD PRESSURE: 122 MMHG | DIASTOLIC BLOOD PRESSURE: 82 MMHG | WEIGHT: 251 LBS | BODY MASS INDEX: 37.18 KG/M2 | HEIGHT: 69 IN | TEMPERATURE: 97.8 F | OXYGEN SATURATION: 98 %

## 2022-06-17 DIAGNOSIS — E55.9 VITAMIN D DEFICIENCY: ICD-10-CM

## 2022-06-17 DIAGNOSIS — R26.81 GAIT INSTABILITY: ICD-10-CM

## 2022-06-17 DIAGNOSIS — E78.5 HYPERLIPIDEMIA LDL GOAL <70: ICD-10-CM

## 2022-06-17 DIAGNOSIS — Z01.84 IMMUNITY STATUS TESTING: ICD-10-CM

## 2022-06-17 DIAGNOSIS — R79.89 ELEVATED LIVER FUNCTION TESTS: ICD-10-CM

## 2022-06-17 DIAGNOSIS — G47.33 OBSTRUCTIVE SLEEP APNEA: ICD-10-CM

## 2022-06-17 DIAGNOSIS — I69.398 ALTERATION OF SENSATIONS, POST-STROKE: ICD-10-CM

## 2022-06-17 DIAGNOSIS — R56.9 NEW ONSET SEIZURE: ICD-10-CM

## 2022-06-17 DIAGNOSIS — I61.9 HEMORRHAGIC STROKE: ICD-10-CM

## 2022-06-17 DIAGNOSIS — M89.9 DISORDER OF BONE, UNSPECIFIED: ICD-10-CM

## 2022-06-17 DIAGNOSIS — I10 ESSENTIAL HYPERTENSION: Primary | ICD-10-CM

## 2022-06-17 DIAGNOSIS — R20.0 NUMBNESS: ICD-10-CM

## 2022-06-17 DIAGNOSIS — M25.60 STIFFNESS OF MULTIPLE JOINTS: ICD-10-CM

## 2022-06-17 DIAGNOSIS — R74.8 ELEVATED CK: ICD-10-CM

## 2022-06-17 DIAGNOSIS — B35.6 TINEA CRURIS: ICD-10-CM

## 2022-06-17 DIAGNOSIS — R20.9 ALTERATION OF SENSATIONS, POST-STROKE: ICD-10-CM

## 2022-06-17 DIAGNOSIS — R47.01 EXPRESSIVE APHASIA: ICD-10-CM

## 2022-06-17 PROCEDURE — 99214 OFFICE O/P EST MOD 30 MIN: CPT | Performed by: PHYSICIAN ASSISTANT

## 2022-06-18 LAB
25(OH)D3+25(OH)D2 SERPL-MCNC: 51.2 NG/ML (ref 30–100)
ALBUMIN SERPL-MCNC: 4.6 G/DL (ref 4–5)
ALBUMIN/GLOB SERPL: 2 {RATIO} (ref 1.2–2.2)
ALP SERPL-CCNC: 70 IU/L (ref 44–121)
ALT SERPL-CCNC: 29 IU/L (ref 0–44)
AST SERPL-CCNC: 20 IU/L (ref 0–40)
BASOPHILS # BLD AUTO: 0 X10E3/UL (ref 0–0.2)
BASOPHILS NFR BLD AUTO: 1 %
BILIRUB SERPL-MCNC: 0.5 MG/DL (ref 0–1.2)
BUN SERPL-MCNC: 14 MG/DL (ref 6–24)
BUN/CREAT SERPL: 14 (ref 9–20)
CALCIUM SERPL-MCNC: 9.6 MG/DL (ref 8.7–10.2)
CHLORIDE SERPL-SCNC: 101 MMOL/L (ref 96–106)
CHOLEST SERPL-MCNC: 235 MG/DL (ref 100–199)
CK SERPL-CCNC: 652 U/L (ref 49–439)
CO2 SERPL-SCNC: 22 MMOL/L (ref 20–29)
CREAT SERPL-MCNC: 0.99 MG/DL (ref 0.76–1.27)
EGFRCR SERPLBLD CKD-EPI 2021: 93 ML/MIN/1.73
EOSINOPHIL # BLD AUTO: 0.1 X10E3/UL (ref 0–0.4)
EOSINOPHIL NFR BLD AUTO: 2 %
ERYTHROCYTE [DISTWIDTH] IN BLOOD BY AUTOMATED COUNT: 12.3 % (ref 11.6–15.4)
GLOBULIN SER CALC-MCNC: 2.3 G/DL (ref 1.5–4.5)
GLUCOSE SERPL-MCNC: 102 MG/DL (ref 65–99)
HCT VFR BLD AUTO: 44.7 % (ref 37.5–51)
HDLC SERPL-MCNC: 37 MG/DL
HGB BLD-MCNC: 14.5 G/DL (ref 13–17.7)
IMM GRANULOCYTES # BLD AUTO: 0 X10E3/UL (ref 0–0.1)
IMM GRANULOCYTES NFR BLD AUTO: 0 %
LDLC SERPL CALC-MCNC: 187 MG/DL (ref 0–99)
LDLC/HDLC SERPL: 5.1 RATIO (ref 0–3.6)
LYMPHOCYTES # BLD AUTO: 1.7 X10E3/UL (ref 0.7–3.1)
LYMPHOCYTES NFR BLD AUTO: 29 %
MCH RBC QN AUTO: 26.4 PG (ref 26.6–33)
MCHC RBC AUTO-ENTMCNC: 32.4 G/DL (ref 31.5–35.7)
MCV RBC AUTO: 81 FL (ref 79–97)
MONOCYTES # BLD AUTO: 0.5 X10E3/UL (ref 0.1–0.9)
MONOCYTES NFR BLD AUTO: 9 %
NEUTROPHILS # BLD AUTO: 3.4 X10E3/UL (ref 1.4–7)
NEUTROPHILS NFR BLD AUTO: 59 %
PLATELET # BLD AUTO: 241 X10E3/UL (ref 150–450)
POTASSIUM SERPL-SCNC: 4.4 MMOL/L (ref 3.5–5.2)
PROT SERPL-MCNC: 6.9 G/DL (ref 6–8.5)
RBC # BLD AUTO: 5.5 X10E6/UL (ref 4.14–5.8)
SODIUM SERPL-SCNC: 136 MMOL/L (ref 134–144)
TRIGL SERPL-MCNC: 63 MG/DL (ref 0–149)
VLDLC SERPL CALC-MCNC: 11 MG/DL (ref 5–40)
VZV IGG SER IA-ACNC: 1741 INDEX
WBC # BLD AUTO: 5.8 X10E3/UL (ref 3.4–10.8)

## 2022-06-21 DIAGNOSIS — E55.9 VITAMIN D DEFICIENCY: ICD-10-CM

## 2022-06-21 DIAGNOSIS — E78.5 HYPERLIPIDEMIA LDL GOAL <70: Primary | ICD-10-CM

## 2022-06-21 DIAGNOSIS — R74.8 ELEVATED CK: ICD-10-CM

## 2022-06-21 RX ORDER — EZETIMIBE 10 MG/1
10 TABLET ORAL DAILY
Qty: 90 TABLET | Refills: 0 | Status: SHIPPED | OUTPATIENT
Start: 2022-06-21 | End: 2022-10-24 | Stop reason: ALTCHOICE

## 2022-06-24 ENCOUNTER — OFFICE VISIT (OUTPATIENT)
Dept: SLEEP MEDICINE | Facility: HOSPITAL | Age: 52
End: 2022-06-24

## 2022-06-24 VITALS
SYSTOLIC BLOOD PRESSURE: 115 MMHG | WEIGHT: 250.4 LBS | HEIGHT: 69 IN | HEART RATE: 66 BPM | OXYGEN SATURATION: 97 % | BODY MASS INDEX: 37.09 KG/M2 | DIASTOLIC BLOOD PRESSURE: 77 MMHG

## 2022-06-24 DIAGNOSIS — E66.9 OBESITY (BMI 30-39.9): ICD-10-CM

## 2022-06-24 DIAGNOSIS — G47.33 OBSTRUCTIVE SLEEP APNEA: Primary | ICD-10-CM

## 2022-06-24 PROCEDURE — G0463 HOSPITAL OUTPT CLINIC VISIT: HCPCS

## 2022-06-24 NOTE — PROGRESS NOTES
[facility]  Telephone: [phone] / Fax: [phone] [place]  Telephone: [phone] / Fax: [phone] Tasha Foster    PCP: Rebecca Nixon PA    Reason for visit: SHANE f/u    Gaurav Mora is a 51 y.o.male  was last seen at [facility] sleep lab 1 year ago. He denies snoring or gasping for breath while on the machine.  He goes to bed at 11pm and is up at 8:30am. His ESS is 10.  He uses FFM and it fits well. He is unaware of leaks or dry mouth.  In end of April he had a seizure. So far no etiology identified. He is now on anti-seizure medications. He does not drive due to prior history of stroke resulting in visual deficits. I reviewed last 90 d d/l report with patient. He demonstrates excellent CPAP compliance. Avg usage on the device is 9 hours and 38 minutes on auto CPAP 8-12cm H2O, AHI 0.6, leak of 3.2 L/min.    SH- no alcohol, no tobacco    ROS- +nasal congestion, +post nasal drip.    DME [facility]    Current Medications:    Current Outpatient Medications:   •  acetaminophen (TYLENOL) 325 MG tablet, Take 650 mg by mouth Every 4 (Four) Hours As Needed for Mild Pain ., Disp: , Rfl:   •  amLODIPine (NORVASC) 10 MG tablet, TAKE 1 TABLET DAILY (Patient taking differently: 10 mg Every Night. TAKE 1 TABLET DAILY), Disp: 90 tablet, Rfl: 3  •  aspirin 81 MG EC tablet, Take 1 tablet by mouth Daily., Disp: , Rfl:   •  Azelastine-Fluticasone 137-50 MCG/ACT suspension, 2 sprays into the nostril(s) as directed by provider As Needed., Disp: , Rfl:   •  clobetasol (CLOBEX) 0.05 % lotion, , Disp: , Rfl:   •  ezetimibe (Zetia) 10 MG tablet, Take 1 tablet by mouth Daily., Disp: 90 tablet, Rfl: 0  •  ketoconazole (NIZORAL) 2 % cream, , Disp: , Rfl:   •  labetalol (NORMODYNE) 100 MG tablet, TAKE 1 TABLET THREE TIMES A DAY, Disp: 90 tablet, Rfl: 0  •  levETIRAcetam (KEPPRA) 1000 MG tablet, Take 1 tablet by mouth 2 (Two) Times a Day., Disp: 180 tablet, Rfl: 3  •  levocetirizine (XYZAL) 5 MG tablet, Take 5 mg  "by mouth Every Evening., Disp: , Rfl:   •  lisinopril (PRINIVIL,ZESTRIL) 20 MG tablet, TAKE 1 TABLET DAILY, Disp: 90 tablet, Rfl: 1  •  miconazole (Lotrimin AF) 2 % powder, Apply  topically to the appropriate area as directed 2 (two) times a day., Disp: 71 g, Rfl: 1  •  montelukast (SINGULAIR) 10 MG tablet, , Disp: , Rfl:   •  omeprazole (priLOSEC) 20 MG capsule, Take 20 mg by mouth Daily., Disp: , Rfl:   •  spironolactone (ALDACTONE) 25 MG tablet, TAKE 1 TABLET DAILY, Disp: 90 tablet, Rfl: 1  •  triamcinolone (KENALOG) 0.1 % cream, , Disp: , Rfl:   •  vitamin D (ERGOCALCIFEROL) 1.25 MG (53644 UT) capsule capsule, TAKE ONE CAPSULE BY MOUTH EVERY 7 DAYS (Patient taking differently: Take 50,000 Units by mouth Every 7 (Seven) Days.), Disp: 13 capsule, Rfl: 1   also entered in Sleep Questionnaire    Patient  has a past medical history of Elevated CK, Environmental allergies, Headache, tension-type, Hemorrhagic cerebrovascular accident (CVA) (HCC), Hemorrhagic stroke (HCC) (11/24/2017), History of bladder infections (12/2017), Hyperlipidemia, Hypertension, Lung nodule, New onset seizure (HCC) (04/28/2022), SHANE on CPAP, PONV (postoperative nausea and vomiting), Seizure (HCC) (04/28/2022), and Stroke ([facility]) (2017).    I have reviewed the Past Medical History, Past Surgical History, Social History and Family History.    Vital Signs /77   Pulse 66   Ht 175.3 cm (69\")   Wt 114 kg (250 lb 6.4 oz)   SpO2 97%   BMI 36.98 kg/m²  Body mass index is 36.98 kg/m².    General Alert and oriented. No acute distress noted   Pharynx/Throat Class IV   Mallampati airway, large tongue, no evidence of redundant lateral pharyngeal tissue. No oral lesions. No thrush. Moist mucous membranes.   Head Normocephalic. Symmetrical. Atraumatic.    Nose No septal deviation. No drainage   Chest Wall Normal shape. Symmetric expansion with respiration. No tenderness.   Neck Trachea midline, no thyromegaly or adenopathy    Lungs Clear to " auscultation bilaterally. No wheezes. No rhonchi. No rales. Respirations regular, even and unlabored.   Heart Regular rhythm and normal rate. Normal S1 and S2. No murmur   Abdomen Soft, non-tender and non-distended. Normal bowel sounds. No masses.   Extremities Moves all extremities well. No edema   Psychiatric Normal mood and affect.       Testing:  Study-7/2/21- Diagnostic data available to date is as below and was reviewed on current visit:  Outside sleep study at [facility] sleep center in [facility], 3/19/2018.  AHI index 14.3 titrated up to 11 cm water pressure       Impression:  1. Obstructive sleep apnea    2. Obesity (BMI 30-39.9)          Plan:  He got a new machine after seeing us last and is doing great. I reviewed original sleep study and recent download report with patient. He benefits from the machine in terms of reduction of hypersomnia and snoring.  I asked him to f/u with Dr Garcia in 1 year. He will see us sooner if he is having any problems. Weight loss will be strongly beneficial.      Follow up with Dr. Garcia in one year    Thank you for allowing me to participate in your patient's care.      JUAN DANIEL Yeboah  [facility]  Phone: [phone]      Part of this note may be an electronic transcription/translation of spoken language to printed text using the Dragon Dictation System.

## 2022-06-28 ENCOUNTER — OFFICE VISIT (OUTPATIENT)
Dept: CARDIOLOGY | Facility: CLINIC | Age: 52
End: 2022-06-28

## 2022-06-28 VITALS
HEIGHT: 69 IN | DIASTOLIC BLOOD PRESSURE: 72 MMHG | OXYGEN SATURATION: 98 % | BODY MASS INDEX: 37.23 KG/M2 | SYSTOLIC BLOOD PRESSURE: 110 MMHG | WEIGHT: 251.4 LBS | HEART RATE: 70 BPM

## 2022-06-28 DIAGNOSIS — E78.49 OTHER HYPERLIPIDEMIA: Primary | ICD-10-CM

## 2022-06-28 DIAGNOSIS — I77.810 ASCENDING AORTA DILATATION: ICD-10-CM

## 2022-06-28 DIAGNOSIS — I10 ESSENTIAL HYPERTENSION: ICD-10-CM

## 2022-06-28 DIAGNOSIS — R74.8 ELEVATED CK: ICD-10-CM

## 2022-06-28 DIAGNOSIS — I61.9 HEMORRHAGIC CEREBROVASCULAR ACCIDENT (CVA): ICD-10-CM

## 2022-06-28 PROCEDURE — 99214 OFFICE O/P EST MOD 30 MIN: CPT | Performed by: INTERNAL MEDICINE

## 2022-06-28 RX ORDER — PRAVASTATIN SODIUM 20 MG
20 TABLET ORAL DAILY
Qty: 30 TABLET | Refills: 2 | Status: SHIPPED | OUTPATIENT
Start: 2022-06-28 | End: 2022-10-05

## 2022-06-30 NOTE — PROGRESS NOTES
Date of Office Visit:  2022  Encounter Provider: Jeremiah Bradford MD  Place of Service: [facility] CARDIOLOGY  Patient Name: Gaurav Mora  :1970    Chief complaint: Follow-up for hypertension, prior hemorrhagic CVA, elevated   CPK level, hyperlipidemia, and ascending aorta dilatation.    History of Present Illness:    I had the pleasure of seeing the patient in cardiology office on 2022.  He is a   very pleasant 52 year-old white male with a history of hypertension, obstructive   sleep apnea, and hemorrhagic CVA who presents for follow-up. The patient's   father is with him again today and assists significantly in his care.  I initially saw   him in the office on 2018.     The patient was visiting in [place], [place], for Thanksgiving in . He experienced   right sided neurological symptoms and visual disturbances on 2017. He was   brought to the hospital at that time where he was found to have an extensive   hemorrhagic CVA.  His blood pressure upon presentation was 242/130.  He was   unaware that he had hypertension prior to this, and it was felt that the hypertension   caused the brain hemorrhage.  At that time, he was been placed on amlodipine,   labetalol, lisinopril, and spironolactone with good results.  He still does have   complete loss of sensation on the right side (although he still has good strength in   the right side), as well as loss of peripheral vision.  He did have an echocardiogram   in [place] the time of his stroke on 2017 which showed an ejection fraction of   60%, and a very mildly dilated proximal aorta at 3.6 cm.  He did have a subsequent   CT scan of his chest on 2019 which showed mild enlargement of the ascending   aorta at 3.8 cm.  He also was found to have a significantly elevated CK level of 1044   on 2019.  He was taken off of Crestor at that time, and the level did decrease;  however, it did remain  chronically elevated afterwards.  He actually had a muscle   biopsy by Dr. Jessie Garner in rheumatology which did not show any evidence   of muscle breakdown.    The patient presents today for follow-up.  He is again accompanied by his father.    Unfortunately, he recently had a seizure on 4/28/2022.  He is now seen neurology,   and is on Keppra.  The Keppra has caused significant fatigue, although he has   not had further seizure activity.  He denied any chest pain or shortness of breath.    His recent lipid panel was recently worse, and is discussed below      Past Medical History:   Diagnosis Date   • Elevated CK    • Environmental allergies    • Headache, tension-type    • Hemorrhagic cerebrovascular accident (CVA) ([facility])    • Hemorrhagic stroke (HCC) 11/24/2017    Residual peripheral vision loss, right-sided sensation loss   • History of bladder infections 12/2017   • Hyperlipidemia    • Hypertension    • Lung nodule    • New onset seizure ([facility]) 04/28/2022   • SHANE on CPAP     WEARS CPAP   • PONV (postoperative nausea and vomiting)    • Seizure ([facility]) 04/28/2022   • Stroke ([facility]) 2017       Past Surgical History:   Procedure Laterality Date   • APPENDECTOMY N/A 1987   • COLONOSCOPY N/A 02/17/2022    Procedure: COLONOSCOPY into  cecum with cold bx polypectomy;  Surgeon: Jeronimo Paul Jr., MD;  Location: [facility] ENDOSCOPY;  Service: General;  Laterality: N/A;  pre: screen  post: polyp    • MUSCLE BIOPSY Left 08/19/2020    Procedure: quadriceps muscle biopsy;  Surgeon: Paulina Nina MD;  Location: [facility] OR;  Service: General;  Laterality: Left;       Current Outpatient Medications on File Prior to Visit   Medication Sig Dispense Refill   • acetaminophen (TYLENOL) 325 MG tablet Take 650 mg by mouth Every 4 (Four) Hours As Needed for Mild Pain .     • amLODIPine (NORVASC) 10 MG tablet TAKE 1 TABLET DAILY (Patient taking differently: 10 mg Every Night. TAKE 1 TABLET DAILY) 90 tablet 3   • aspirin 81 MG EC  tablet Take 1 tablet by mouth Daily.     • Azelastine-Fluticasone 137-50 MCG/ACT suspension 2 sprays into the nostril(s) as directed by provider As Needed.     • clobetasol (CLOBEX) 0.05 % lotion      • ezetimibe (Zetia) 10 MG tablet Take 1 tablet by mouth Daily. 90 tablet 0   • ketoconazole (NIZORAL) 2 % cream      • labetalol (NORMODYNE) 100 MG tablet TAKE 1 TABLET THREE TIMES A DAY 90 tablet 0   • levETIRAcetam (KEPPRA) 1000 MG tablet Take 1 tablet by mouth 2 (Two) Times a Day. 180 tablet 3   • levocetirizine (XYZAL) 5 MG tablet Take 5 mg by mouth Every Evening.     • lisinopril (PRINIVIL,ZESTRIL) 20 MG tablet TAKE 1 TABLET DAILY 90 tablet 1   • miconazole (Lotrimin AF) 2 % powder Apply  topically to the appropriate area as directed 2 (two) times a day. 71 g 1   • montelukast (SINGULAIR) 10 MG tablet      • omeprazole (priLOSEC) 20 MG capsule Take 20 mg by mouth Daily.     • spironolactone (ALDACTONE) 25 MG tablet TAKE 1 TABLET DAILY 90 tablet 1   • triamcinolone (KENALOG) 0.1 % cream      • vitamin D (ERGOCALCIFEROL) 1.25 MG (80707 UT) capsule capsule TAKE ONE CAPSULE BY MOUTH EVERY 7 DAYS (Patient taking differently: Take 50,000 Units by mouth Every 7 (Seven) Days.) 13 capsule 1     No current facility-administered medications on file prior to visit.     Allergies as of 06/28/2022   • (No Known Allergies)     Social History     Socioeconomic History   • Marital status: Single   • Number of children: 0   Tobacco Use   • Smoking status: Never Smoker   • Smokeless tobacco: Never Used   • Tobacco comment: minimal caffeine   Vaping Use   • Vaping Use: Never used   Substance and Sexual Activity   • Alcohol use: Not Currently     Alcohol/week: 1.0 standard drink     Types: 1 Cans of beer per week     Comment: This is rare.   • Drug use: Never   • Sexual activity: Never     Family History   Problem Relation Age of Onset   • Breast cancer Mother    • Atrial fibrillation Father    • Hypertension Father    • Skin cancer  "Father    • Colon cancer Paternal Grandmother    • Heart disease Maternal Grandfather    • Parkinsonism Maternal Grandfather    • Heart disease Maternal Aunt    • Heart disease Maternal Uncle    • Malig Hyperthermia Neg Hx        Review of Systems   Constitutional: Positive for malaise/fatigue.   All other systems reviewed and are negative.     Objective:     Vitals:    06/28/22 1049   BP: 110/72   Pulse: 70   SpO2: 98%   Weight: 114 kg (251 lb 6.4 oz)   Height: 175.3 cm (69.02\")     Body mass index is 37.11 kg/m².    Physical Exam  Constitutional:       Appearance: He is well-developed.   HENT:      Head: Normocephalic and atraumatic.   Eyes:      Conjunctiva/sclera: Conjunctivae normal.   Cardiovascular:      Rate and Rhythm: Normal rate and regular rhythm.      Heart sounds: No murmur heard.    No friction rub. No gallop.   Pulmonary:      Effort: Pulmonary effort is normal.      Breath sounds: Normal breath sounds.   Abdominal:      Palpations: Abdomen is soft.      Tenderness: There is no abdominal tenderness.   Musculoskeletal:      Cervical back: Neck supple.   Skin:     General: Skin is warm.   Neurological:      Mental Status: He is alert and oriented to person, place, and time.   Psychiatric:         Behavior: Behavior normal.       Lab Review:   Procedures    Cardiac Procedures:  1.  Echocardiogram on 11/27/2017 in [place], [place]: The ejection fraction was 60%. The   aortic root measured 3.8 cm.  The proximal aorta was 3.6 cm.  2.  Noncontrast CT scan of the chest on 9/9/2019: There was a stable pulmonary nodule   left upper lobe.  There was hepatic steatosis.  There was mild enlargement of the   ascending aorta at 3.8 cm.  3.  Noncontrast CT scan of the chest on 3/10/2021: Ascending aorta measured up to 3.8 cm.    Assessment:       Diagnosis Plan   1. Other hyperlipidemia  CK    Hepatic Function Panel    Lipid Panel   2. Elevated CK  CK   3. Essential hypertension  CK    Hepatic Function Panel    Lipid " Panel   4. Hemorrhagic cerebrovascular accident (CVA) (HCC)     5. Ascending aorta dilatation (HCC)       Plan:       Again, he did have a seizure in April 2022.  He has been on Keppra, which does cause fatigue, although he has not had any further seizure activity.  He is following with neurology.  His blood pressure has been well controlled, and is excellent today at 110/72.  He will remain on the amlodipine 10 mg/day, labetalol 100 mg 3 times per day, lisinopril 20 mg/day, and spironolactone 25 mg/day.  He has done very well on this regimen so far.  He is very faithful about wearing his CPAP mask at night.  He should remain on aspirin for life given his history of stroke.    With regards to the statin therapy, this is obviously ideal given his history of stroke (even though this was a hemorrhagic stroke).  His CK level did decrease after stopping the Crestor in the past, but it remained significantly elevated.  He has had a muscle biopsy by Dr. Jessie Garner in rheumatology.  This did not show any pathology.  I have tried to get Repatha and Praluent on multiple occasions, even submitting prior authorizations.  His insurance company has continued to deny this.  His most recent lipid panel from 6/18/2022 showed a total cholesterol 235, HDL 37, , and triglycerides 63.  His last CK level was 652.  This is down from 808 six months ago, and as high as 1044 two years ago.  I am going to cautiously try him on pravastatin 20 mg/day to see if his CK level will tolerate this.  He had no symptoms with statins in the past.  I will recheck a lipid panel, liver function test, and a CK level in 1 month.  I discussed this in detail with the patient and his father, and they were in agreement.    He did have a CT scan without contrast on 3/10/2021 to reassess a lung nodule.  I measured his aorta at 3.8 cm, which is very mildly dilated.  With this minor degree of dilatation, he likely does not need another CT scan for quite  some time.    I will have him return to the office in 6 months.

## 2022-07-20 RX ORDER — LABETALOL 100 MG/1
TABLET, FILM COATED ORAL
Qty: 180 TABLET | Refills: 2 | Status: SHIPPED | OUTPATIENT
Start: 2022-07-20 | End: 2023-01-16

## 2022-07-22 ENCOUNTER — OFFICE VISIT (OUTPATIENT)
Dept: NEUROLOGY | Facility: CLINIC | Age: 52
End: 2022-07-22

## 2022-07-22 ENCOUNTER — LAB (OUTPATIENT)
Dept: LAB | Facility: HOSPITAL | Age: 52
End: 2022-07-22

## 2022-07-22 VITALS
OXYGEN SATURATION: 99 % | WEIGHT: 252 LBS | BODY MASS INDEX: 37.33 KG/M2 | SYSTOLIC BLOOD PRESSURE: 128 MMHG | HEIGHT: 69 IN | DIASTOLIC BLOOD PRESSURE: 68 MMHG | HEART RATE: 59 BPM

## 2022-07-22 DIAGNOSIS — G40.909 SEIZURE DISORDER AS SEQUELA OF CEREBROVASCULAR ACCIDENT: Primary | ICD-10-CM

## 2022-07-22 DIAGNOSIS — I69.398 SEIZURE DISORDER AS SEQUELA OF CEREBROVASCULAR ACCIDENT: Primary | ICD-10-CM

## 2022-07-22 DIAGNOSIS — I69.30 SEQUELAE, POST-STROKE: ICD-10-CM

## 2022-07-22 DIAGNOSIS — R74.8 ELEVATED CK: ICD-10-CM

## 2022-07-22 DIAGNOSIS — I10 ESSENTIAL HYPERTENSION: ICD-10-CM

## 2022-07-22 DIAGNOSIS — E78.49 OTHER HYPERLIPIDEMIA: ICD-10-CM

## 2022-07-22 LAB
ALBUMIN SERPL-MCNC: 4.6 G/DL (ref 3.5–5.2)
ALP SERPL-CCNC: 62 U/L (ref 39–117)
ALT SERPL W P-5'-P-CCNC: 29 U/L (ref 1–41)
AST SERPL-CCNC: 21 U/L (ref 1–40)
BILIRUB CONJ SERPL-MCNC: <0.2 MG/DL (ref 0–0.3)
BILIRUB INDIRECT SERPL-MCNC: NORMAL MG/DL
BILIRUB SERPL-MCNC: 0.3 MG/DL (ref 0–1.2)
CHOLEST SERPL-MCNC: 169 MG/DL (ref 0–200)
CK SERPL-CCNC: 538 U/L (ref 20–200)
HDLC SERPL-MCNC: 40 MG/DL (ref 40–60)
LDLC SERPL CALC-MCNC: 119 MG/DL (ref 0–100)
LDLC/HDLC SERPL: 2.98 {RATIO}
PROT SERPL-MCNC: 7 G/DL (ref 6–8.5)
TRIGL SERPL-MCNC: 49 MG/DL (ref 0–150)
VLDLC SERPL-MCNC: 10 MG/DL (ref 5–40)

## 2022-07-22 PROCEDURE — 36415 COLL VENOUS BLD VENIPUNCTURE: CPT

## 2022-07-22 PROCEDURE — 80076 HEPATIC FUNCTION PANEL: CPT

## 2022-07-22 PROCEDURE — 82550 ASSAY OF CK (CPK): CPT

## 2022-07-22 PROCEDURE — 99215 OFFICE O/P EST HI 40 MIN: CPT | Performed by: PSYCHIATRY & NEUROLOGY

## 2022-07-22 PROCEDURE — 80061 LIPID PANEL: CPT

## 2022-07-22 RX ORDER — OXCARBAZEPINE 300 MG/1
TABLET, FILM COATED ORAL
Qty: 120 TABLET | Refills: 6 | Status: SHIPPED | OUTPATIENT
Start: 2022-07-22 | End: 2022-10-24 | Stop reason: SDUPTHER

## 2022-07-22 NOTE — PROGRESS NOTES
"CC: Stroke, hyper CK emia, complex partial seizures and secondarily generalized seizures    HPI:  Gaurav Mora is a  52 y.o. right-handed white male who I am seeing in follow-up primarily for his new onset complex partial and secondarily generalized seizures.  He has history of stroke and hyper CK emia.  He is not a candidate for statins since his CK dramatically went up on statins.  I saw him most recently 6/10/2022 with the following history taken from that note with additions/modifications as indicated:     The patient had a hemorrhagic stroke, left MCA territory and has hyper CK emia. He has hypertension treated with 4 drugs and hyperlipidemia who had notable elevation of CK on a statin which did not fully resolve on stopping the statin.  He was evaluated by Dr. Jessie Garner for an inflammatory myopathy but muscle biopsy did not demonstrate evidence of inflammation.  In my opinion he is not a candidate for statins and I recommended a drug such as Repatha to further discuss with his cardiologist Dr. Bradford.  He is on aspirin prophylactically.  He was admitted then seen by Bailey Ward NP of neurology 4/29/2022 for new onset seizure.  The patient states that he felt dizzy then blacked out and woke up in the ambulance.  He had a convulsion.  No previously described seizures.  He was placed on Keppra 1000 mg twice daily.  There has been some noticeable changes such as daytime drowsiness and feeling dizzy a couple of times daily.  The dizziness is typically when he is on his feet but can occur if he is sitting.  It does not occur if he lays down or rolls over.  He has has been more \"mcneal\" and recent description of an episode where he completely lost composure occurred recently and was described by his father and stepmother (she was out of town but on the phone during this evaluation).  Of interest his stepmother recorded an episode which he had been having occasionally where his arms have some " stereotypic movements on both sides and he does not respond to voice nor talk.  These episodes preceded his seizure but I was not made aware of them when he came for follow-ups with his father.  The patient is not fully able to identify these episodes.  These episodes are consistent with complex partial seizures. The patient has obstructive sleep apnea and uses CPAP.    The patient had an EEG read by Dr. Adam showing left hemisphere slowing and sharp waves.    Since I last saw the patient he has continued to have moodiness which is different from usual.  I believe it is at least partially related to his Keppra but may also be related to the fact that he has a brain injury from his stroke and other perhaps in a personality characteristics.  The patient stepmother had spoken with a healthcare professional she is familiar with who had suggested perhaps mood stabilizing medication such as Tegretol, Depakote or Lamictal.  The patient has had no further episodes consistent with seizure of any type.  He states that his drowsiness and brain fog are gone    He was seen today with his father and his stepmother was on speaker phone    Past Medical History:   Diagnosis Date   • Elevated CK    • Environmental allergies    • Headache, tension-type    • Hemorrhagic cerebrovascular accident (CVA) ([facility])    • Hemorrhagic stroke ([facility]) 11/24/2017    Residual peripheral vision loss, right-sided sensation loss   • History of bladder infections 12/2017   • Hyperlipidemia    • Hypertension    • Lung nodule    • New onset seizure ([facility]) 04/28/2022   • SHANE on CPAP     WEARS CPAP   • PONV (postoperative nausea and vomiting)    • Seizure ([facility]) 04/28/2022   • Stroke ([facility]) 2017         Past Surgical History:   Procedure Laterality Date   • APPENDECTOMY N/A 1987   • COLONOSCOPY N/A 02/17/2022    Procedure: COLONOSCOPY into  cecum with cold bx polypectomy;  Surgeon: Jeronimo Paul Jr., MD;  Location: [facility] ENDOSCOPY;  Service: General;   Laterality: N/A;  pre: screen  post: polyp    • MUSCLE BIOPSY Left 08/19/2020    Procedure: quadriceps muscle biopsy;  Surgeon: Paulina Nina MD;  Location: [facility];  Service: General;  Laterality: Left;           Current Outpatient Medications:   •  acetaminophen (TYLENOL) 325 MG tablet, Take 650 mg by mouth Every 4 (Four) Hours As Needed for Mild Pain ., Disp: , Rfl:   •  amLODIPine (NORVASC) 10 MG tablet, TAKE 1 TABLET DAILY (Patient taking differently: 10 mg Every Night. TAKE 1 TABLET DAILY), Disp: 90 tablet, Rfl: 3  •  aspirin 81 MG EC tablet, Take 1 tablet by mouth Daily., Disp: , Rfl:   •  Azelastine-Fluticasone 137-50 MCG/ACT suspension, 2 sprays into the nostril(s) as directed by provider As Needed., Disp: , Rfl:   •  ezetimibe (Zetia) 10 MG tablet, Take 1 tablet by mouth Daily., Disp: 90 tablet, Rfl: 0  •  labetalol (NORMODYNE) 100 MG tablet, TAKE 1 TABLET THREE TIMES A DAY, Disp: 180 tablet, Rfl: 2  •  levETIRAcetam (KEPPRA) 1000 MG tablet, Take 1 tablet by mouth 2 (Two) Times a Day., Disp: 180 tablet, Rfl: 3  •  levocetirizine (XYZAL) 5 MG tablet, Take 5 mg by mouth Every Evening., Disp: , Rfl:   •  lisinopril (PRINIVIL,ZESTRIL) 20 MG tablet, TAKE 1 TABLET DAILY, Disp: 90 tablet, Rfl: 1  •  montelukast (SINGULAIR) 10 MG tablet, , Disp: , Rfl:   •  omeprazole (priLOSEC) 20 MG capsule, Take 20 mg by mouth Daily., Disp: , Rfl:   •  pravastatin (Pravachol) 20 MG tablet, Take 1 tablet by mouth Daily., Disp: 30 tablet, Rfl: 2  •  spironolactone (ALDACTONE) 25 MG tablet, TAKE 1 TABLET DAILY, Disp: 90 tablet, Rfl: 1  •  vitamin D (ERGOCALCIFEROL) 1.25 MG (26815 UT) capsule capsule, TAKE ONE CAPSULE BY MOUTH EVERY 7 DAYS (Patient taking differently: Take 50,000 Units by mouth Every 7 (Seven) Days.), Disp: 13 capsule, Rfl: 1      Family History   Problem Relation Age of Onset   • Breast cancer Mother    • Atrial fibrillation Father    • Hypertension Father    • Skin cancer Father    • Colon cancer  "Paternal Grandmother    • Heart disease Maternal Grandfather    • Parkinsonism Maternal Grandfather    • Heart disease Maternal Aunt    • Heart disease Maternal Uncle    • Malig Hyperthermia Neg Hx          Social History     Socioeconomic History   • Marital status: Single   • Number of children: 0   Tobacco Use   • Smoking status: Never Smoker   • Smokeless tobacco: Never Used   • Tobacco comment: minimal caffeine   Vaping Use   • Vaping Use: Never used   Substance and Sexual Activity   • Alcohol use: Not Currently     Alcohol/week: 1.0 standard drink     Types: 1 Cans of beer per week     Comment: This is rare.   • Drug use: Never   • Sexual activity: Never         No Known Allergies      Pain Scale: 0/10        ROS:  Review of Systems   Endocrine: Negative for cold intolerance, heat intolerance and polydipsia.   Genitourinary: Negative for decreased urine volume, difficulty urinating and urgency.   Musculoskeletal: Positive for gait problem. Negative for neck pain and neck stiffness.   Skin: Negative for color change, rash and wound.   Allergic/Immunologic: Negative for environmental allergies, food allergies and immunocompromised state.   Neurological: Negative for dizziness, tremors, seizures, syncope, facial asymmetry, speech difficulty, weakness, light-headedness, numbness and headaches.   Hematological: Negative for adenopathy. Does not bruise/bleed easily.         I have reviewed and agree with the above ROS completed by the medical assistant.      Physical Exam:  Vitals:    07/22/22 0820   BP: 128/68   BP Location: Right arm   Patient Position: Sitting   Cuff Size: Adult   Pulse: 59   SpO2: 99%   Weight: 114 kg (252 lb)   Height: 175.3 cm (69.02\")     Orthostatic BP:    Body mass index is 37.19 kg/m².    Physical Exam  General: Obese but muscular white male no acute distress  HEENT: Normocephalic  Neck: Supple  Heart: Regular rate and rhythm  Extremities: No pedal edema      Neurological Exam:   Mental " Status: Awake, alert, oriented to person, place and time.  Conversant without evidence of an affective disorder, thought disorder, delusions or hallucinations.  Attention span and concentration are normal.  HCF: He has some expressive aphasia, no apraxia but minimal dysarthria.  Recent and remote memory intact.  Knowledge of recent events intact.  CN: I:   II: Visual fields full without left inattention   III, IV, VI: Eye movements intact without nystagmus or ptosis.  Pupils equal round and reactive to light.   V,VII: Light touch and pinprick intact all 3 divisions of V.  Facial muscles appear symmetrical.   VIII: Hearing intact to finger rub   IX,X: Soft palate elevates symmetrically   XI: Sternomastoid and trapezius are strong.   XII: Tongue midline without atrophy or fasciculations  Motor: Normal tone and bulk in the upper and lower extremities   Power testing: Minimal right-sided weakness  Reflexes: Upper extremities:        Lower extremities:        Toe signs:  Sensory: Light touch:        Pinprick:        Vibration:        Position:    Cerebellar: Finger-to-nose:           Rapid movement:           Heel-to-shin:  Gait and Station: Mildly broad-based and a little unsteady    Results:      Lab Results   Component Value Date    GLUCOSE 102 (H) 06/17/2022    BUN 14 06/17/2022    CREATININE 0.99 06/17/2022    EGFRIFNONA 88 12/21/2021    EGFRIFAFRI 102 12/21/2021    BCR 14 06/17/2022    CO2 22 06/17/2022    CALCIUM 9.6 06/17/2022    PROTENTOTREF 6.9 06/17/2022    ALBUMIN 4.6 06/17/2022    LABIL2 2.0 06/17/2022    AST 20 06/17/2022    ALT 29 06/17/2022       Lab Results   Component Value Date    WBC 5.8 06/17/2022    HGB 14.5 06/17/2022    HCT 44.7 06/17/2022    MCV 81 06/17/2022     06/17/2022         .No results found for: RPR      Lab Results   Component Value Date    TSH 2.130 04/28/2022         No results found for: [id number]      No results found for: FOLATE      No results found for: HGBA1C      Lab  Results   Component Value Date    GLUCOSE 102 (H) 06/17/2022    BUN 14 06/17/2022    CREATININE 0.99 06/17/2022    EGFRIFNONA 88 12/21/2021    EGFRIFAFRI 102 12/21/2021    BCR 14 06/17/2022    K 4.4 06/17/2022    CO2 22 06/17/2022    CALCIUM 9.6 06/17/2022    PROTENTOTREF 6.9 06/17/2022    ALBUMIN 4.6 06/17/2022    LABIL2 2.0 06/17/2022    AST 20 06/17/2022    ALT 29 06/17/2022         Lab Results   Component Value Date    WBC 5.8 06/17/2022    HGB 14.5 06/17/2022    HCT 44.7 06/17/2022    MCV 81 06/17/2022     06/17/2022       EEG report reviewed as noted above      Assessment:   1.  History of left MCA stroke with sequelae  2.  Seizures, complex partial and secondarily generalized-no recurrences on Keppra 1000 mg twice daily  3.  Episodic behavioral outbursts which are suspicious for being partially related to the Keppra.  4.  Hyper CK emia          Plan:  1.  Continue aspirin and risk factor control regarding stroke  2.  I had lengthy discussion with the patient's father and stepmother regarding options for seizure medications.  I suspect once off of Keppra behaviorally he will improve and so I am not sure that a focused effort to place him on mood stabilizing medications is necessary at the present time; however, some of our epilepsy medications may still benefit him by stabilizing mood but the side effect profiles need consideration.  For instance Tegretol can cause liver dysfunction, rash including Garcia-Ed syndrome, low sodium and blood dyscrasias.  Depakote can cause liver dysfunction, weight gain and low platelet counts.  Lamictal probably has the highest risk of rash of any of our epilepsy drugs commonly used.  Other options may be oxcarbazepine which has less general risks compared to Tegretol other than the low sodium.  Gabapentin could be considered if we truly need a drug that does not interact with his hypertension meds but it is a low potency drug requiring high doses and I would feel  much more comfortable as an adjunctive drug then monotherapy.  Oxcarbazepine can be used as monotherapy and it actually does have a bipolar disorder indication.    We will begin oxcarbazepine at 300 mg twice daily for 2 weeks and if no significant issues will escalate dosage to 600 mg twice daily and reduce Keppra to 1000 mg daily for 2 weeks.  At that point the Keppra will be stopped in favor of oxcarbazepine 600 mg twice daily.  The dosage could be escalated further up to 2400 mg/day if needed.  In 3 months he will need labs  3.  Follow-up with one of our midlevel's in 3 months after lab work to include CBC, CMP.            Time: 45 minutes              Dictated utilizing Dragon dictation.

## 2022-07-28 DIAGNOSIS — I10 ESSENTIAL HYPERTENSION: Primary | ICD-10-CM

## 2022-07-28 DIAGNOSIS — I63.9 CEREBROVASCULAR ACCIDENT (CVA), UNSPECIFIED MECHANISM: ICD-10-CM

## 2022-07-28 NOTE — PROGRESS NOTES
I called and spoke with him on the phone.  His CK level is elevated, but is actually down from what it was.  His LDL has also responded very favorably to the 20 mg of pravastatin.  This still needs to be watched closely given his significantly elevated CK levels in the past.  I am going to recheck the CK level and lipid panel in 1 month.  I discussed all this on the phone with him.    TERRIE

## 2022-08-24 RX ORDER — ERGOCALCIFEROL 1.25 MG/1
CAPSULE ORAL
Qty: 13 CAPSULE | Refills: 1 | Status: SHIPPED | OUTPATIENT
Start: 2022-08-24 | End: 2022-11-11 | Stop reason: SDUPTHER

## 2022-09-13 ENCOUNTER — LAB (OUTPATIENT)
Dept: LAB | Facility: HOSPITAL | Age: 52
End: 2022-09-13

## 2022-09-13 DIAGNOSIS — E78.5 HYPERLIPIDEMIA, UNSPECIFIED HYPERLIPIDEMIA TYPE: ICD-10-CM

## 2022-09-13 DIAGNOSIS — I10 ESSENTIAL HYPERTENSION: ICD-10-CM

## 2022-09-13 DIAGNOSIS — I63.9 CEREBROVASCULAR ACCIDENT (CVA), UNSPECIFIED MECHANISM: ICD-10-CM

## 2022-09-13 LAB
CHOLEST SERPL-MCNC: 176 MG/DL (ref 0–200)
CK SERPL-CCNC: 969 U/L (ref 20–200)
HDLC SERPL-MCNC: 43 MG/DL (ref 40–60)
LDLC SERPL CALC-MCNC: 119 MG/DL (ref 0–100)
LDLC/HDLC SERPL: 2.75 {RATIO}
TRIGL SERPL-MCNC: 74 MG/DL (ref 0–150)
VLDLC SERPL-MCNC: 14 MG/DL (ref 5–40)

## 2022-09-13 PROCEDURE — 80061 LIPID PANEL: CPT

## 2022-09-13 PROCEDURE — 82550 ASSAY OF CK (CPK): CPT

## 2022-09-13 PROCEDURE — 36415 COLL VENOUS BLD VENIPUNCTURE: CPT

## 2022-09-15 NOTE — PROGRESS NOTES
Called and gave him the results.  His CK level has gone up again.  I told him to start taking the pravastatin every other night.  He is going to get labs drawn in the next 1 to 2 months, including a lipid panel and a repeat CK level.    GM

## 2022-10-05 RX ORDER — PRAVASTATIN SODIUM 20 MG
20 TABLET ORAL DAILY
Qty: 30 TABLET | Refills: 2 | Status: SHIPPED | OUTPATIENT
Start: 2022-10-05 | End: 2022-12-19

## 2022-10-17 ENCOUNTER — LAB (OUTPATIENT)
Dept: LAB | Facility: HOSPITAL | Age: 52
End: 2022-10-17

## 2022-10-17 DIAGNOSIS — G40.909 SEIZURE DISORDER AS SEQUELA OF CEREBROVASCULAR ACCIDENT: ICD-10-CM

## 2022-10-17 DIAGNOSIS — I69.398 SEIZURE DISORDER AS SEQUELA OF CEREBROVASCULAR ACCIDENT: ICD-10-CM

## 2022-10-17 LAB
ALBUMIN SERPL-MCNC: 4.4 G/DL (ref 3.5–5.2)
ALBUMIN/GLOB SERPL: 1.9 G/DL
ALP SERPL-CCNC: 71 U/L (ref 39–117)
ALT SERPL W P-5'-P-CCNC: 43 U/L (ref 1–41)
ANION GAP SERPL CALCULATED.3IONS-SCNC: 10 MMOL/L (ref 5–15)
AST SERPL-CCNC: 39 U/L (ref 1–40)
BASOPHILS # BLD AUTO: 0.04 10*3/MM3 (ref 0–0.2)
BASOPHILS NFR BLD AUTO: 0.4 % (ref 0–1.5)
BILIRUB SERPL-MCNC: 0.3 MG/DL (ref 0–1.2)
BUN SERPL-MCNC: 12 MG/DL (ref 6–20)
BUN/CREAT SERPL: 12.9 (ref 7–25)
CALCIUM SPEC-SCNC: 9.3 MG/DL (ref 8.6–10.5)
CHLORIDE SERPL-SCNC: 101 MMOL/L (ref 98–107)
CO2 SERPL-SCNC: 27 MMOL/L (ref 22–29)
CREAT SERPL-MCNC: 0.93 MG/DL (ref 0.76–1.27)
DEPRECATED RDW RBC AUTO: 38.5 FL (ref 37–54)
EGFRCR SERPLBLD CKD-EPI 2021: 100 ML/MIN/1.73
EOSINOPHIL # BLD AUTO: 0.09 10*3/MM3 (ref 0–0.4)
EOSINOPHIL NFR BLD AUTO: 0.8 % (ref 0.3–6.2)
ERYTHROCYTE [DISTWIDTH] IN BLOOD BY AUTOMATED COUNT: 12.9 % (ref 12.3–15.4)
GLOBULIN UR ELPH-MCNC: 2.3 GM/DL
GLUCOSE SERPL-MCNC: 100 MG/DL (ref 65–99)
HCT VFR BLD AUTO: 43.1 % (ref 37.5–51)
HGB BLD-MCNC: 14.1 G/DL (ref 13–17.7)
IMM GRANULOCYTES # BLD AUTO: 0.04 10*3/MM3 (ref 0–0.05)
IMM GRANULOCYTES NFR BLD AUTO: 0.4 % (ref 0–0.5)
LYMPHOCYTES # BLD AUTO: 1.48 10*3/MM3 (ref 0.7–3.1)
LYMPHOCYTES NFR BLD AUTO: 13 % (ref 19.6–45.3)
MCH RBC QN AUTO: 27.2 PG (ref 26.6–33)
MCHC RBC AUTO-ENTMCNC: 32.7 G/DL (ref 31.5–35.7)
MCV RBC AUTO: 83 FL (ref 79–97)
MONOCYTES # BLD AUTO: 0.97 10*3/MM3 (ref 0.1–0.9)
MONOCYTES NFR BLD AUTO: 8.5 % (ref 5–12)
NEUTROPHILS NFR BLD AUTO: 76.9 % (ref 42.7–76)
NEUTROPHILS NFR BLD AUTO: 8.79 10*3/MM3 (ref 1.7–7)
NRBC BLD AUTO-RTO: 0 /100 WBC (ref 0–0.2)
PLATELET # BLD AUTO: 248 10*3/MM3 (ref 140–450)
PMV BLD AUTO: 9.7 FL (ref 6–12)
POTASSIUM SERPL-SCNC: 4.3 MMOL/L (ref 3.5–5.2)
PROT SERPL-MCNC: 6.7 G/DL (ref 6–8.5)
RBC # BLD AUTO: 5.19 10*6/MM3 (ref 4.14–5.8)
SODIUM SERPL-SCNC: 138 MMOL/L (ref 136–145)
WBC NRBC COR # BLD: 11.41 10*3/MM3 (ref 3.4–10.8)

## 2022-10-17 PROCEDURE — 80053 COMPREHEN METABOLIC PANEL: CPT | Performed by: PSYCHIATRY & NEUROLOGY

## 2022-10-17 PROCEDURE — 36415 COLL VENOUS BLD VENIPUNCTURE: CPT | Performed by: PSYCHIATRY & NEUROLOGY

## 2022-10-17 PROCEDURE — 85025 COMPLETE CBC W/AUTO DIFF WBC: CPT

## 2022-10-24 ENCOUNTER — OFFICE VISIT (OUTPATIENT)
Dept: NEUROLOGY | Facility: CLINIC | Age: 52
End: 2022-10-24

## 2022-10-24 VITALS
DIASTOLIC BLOOD PRESSURE: 82 MMHG | OXYGEN SATURATION: 97 % | HEIGHT: 69 IN | WEIGHT: 256.6 LBS | BODY MASS INDEX: 38 KG/M2 | HEART RATE: 68 BPM | SYSTOLIC BLOOD PRESSURE: 128 MMHG

## 2022-10-24 DIAGNOSIS — I69.30 SEQUELAE, POST-STROKE: ICD-10-CM

## 2022-10-24 DIAGNOSIS — G40.209 PARTIAL SYMPTOMATIC EPILEPSY WITH COMPLEX PARTIAL SEIZURES, NOT INTRACTABLE, WITHOUT STATUS EPILEPTICUS: Primary | ICD-10-CM

## 2022-10-24 PROCEDURE — 99214 OFFICE O/P EST MOD 30 MIN: CPT | Performed by: PSYCHIATRY & NEUROLOGY

## 2022-10-24 RX ORDER — IPRATROPIUM BROMIDE 42 UG/1
SPRAY, METERED NASAL
COMMUNITY
Start: 2022-10-17

## 2022-10-24 RX ORDER — OXCARBAZEPINE 600 MG/1
600 TABLET, FILM COATED ORAL 2 TIMES DAILY
Qty: 14 TABLET | Refills: 0 | Status: SHIPPED | OUTPATIENT
Start: 2022-10-24

## 2022-10-24 RX ORDER — OXCARBAZEPINE 600 MG/1
600 TABLET, FILM COATED ORAL 2 TIMES DAILY
Qty: 180 TABLET | Refills: 1 | Status: SHIPPED | OUTPATIENT
Start: 2022-10-24 | End: 2022-11-01

## 2022-10-24 NOTE — PROGRESS NOTES
CC: Seizure disorder, previous hemorrhagic stroke    HPI:  Gaurav Mora is a  52 y.o.  right-handed white male who is here on short follow-up of 3 months regarding the switching of his seizure medication from Keppra to oxcarbazepine.  The patient had complex partial seizures and was evaluated and placed on Keppra.  He has had no further seizures since being placed on Keppra or after changing over to oxcarbazepine.  The reason for the change over was likely side effect to Keppra including easily agitated.  His father is here as usual and both explained that he is much better now compared to before.  He is back to his baseline.  He indicates no specific side effects to oxcarbazepine that he can tell.  He actually feels like he has more energy he states.  He currently is on 600 mg twice daily.    We obtained follow-up CBC and CMP.  CBC demonstrates no blood dyscrasia.  His white count was actually slightly elevated and he had some early cold symptoms when he had his blood drawn he states.  The CMP demonstrates normal sodium of 138.  His ALT was slightly elevated at 43 (normal up to 41) with a normal AST of 39.  It is noted that the patient is on pravastatin but the dosage has been reduced to every other day by cardiology because he had a bump in his CPK which went up to 938.  He and his father indicate that his insurance simply will not pay for Repatha as an alternative despite the obvious potential benefits to this patient      Past Medical History:   Diagnosis Date   • Elevated CK    • Environmental allergies    • Headache, tension-type    • Hemorrhagic cerebrovascular accident (CVA) (HCC)    • Hemorrhagic stroke (HCC) 11/24/2017    Residual peripheral vision loss, right-sided sensation loss   • History of bladder infections 12/2017   • Hyperlipidemia    • Hypertension    • Lung nodule    • New onset seizure (HCC) 04/28/2022   • SHANE on CPAP     WEARS CPAP   • PONV (postoperative nausea and vomiting)    • Seizure  (HCC) 04/28/2022   • Stroke (HCC) 2017         Past Surgical History:   Procedure Laterality Date   • APPENDECTOMY N/A 1987   • COLONOSCOPY N/A 02/17/2022    Procedure: COLONOSCOPY into  cecum with cold bx polypectomy;  Surgeon: Jeronimo Paul Jr., MD;  Location: [facility] ENDOSCOPY;  Service: General;  Laterality: N/A;  pre: screen  post: polyp    • MUSCLE BIOPSY Left 08/19/2020    Procedure: quadriceps muscle biopsy;  Surgeon: Paulina Nina MD;  Location: [facility] MAIN OR;  Service: General;  Laterality: Left;           Current Outpatient Medications:   •  acetaminophen (TYLENOL) 325 MG tablet, Take 650 mg by mouth Every 4 (Four) Hours As Needed for Mild Pain ., Disp: , Rfl:   •  amLODIPine (NORVASC) 10 MG tablet, TAKE 1 TABLET DAILY (Patient taking differently: 1 tablet Every Night. TAKE 1 TABLET DAILY), Disp: 90 tablet, Rfl: 3  •  aspirin 81 MG EC tablet, Take 1 tablet by mouth Daily., Disp: , Rfl:   •  ipratropium (ATROVENT) 0.06 % nasal spray, , Disp: , Rfl:   •  labetalol (NORMODYNE) 100 MG tablet, TAKE 1 TABLET THREE TIMES A DAY, Disp: 180 tablet, Rfl: 2  •  levocetirizine (XYZAL) 5 MG tablet, Take 5 mg by mouth Every Evening., Disp: , Rfl:   •  lisinopril (PRINIVIL,ZESTRIL) 20 MG tablet, TAKE 1 TABLET DAILY, Disp: 90 tablet, Rfl: 1  •  omeprazole (priLOSEC) 20 MG capsule, Take 20 mg by mouth Daily., Disp: , Rfl:   •  OXcarbazepine (TRILEPTAL) 600 MG tablet, Take 1 tablet by mouth 2 (Two) Times a Day., Disp: 14 tablet, Rfl: 0  •  pravastatin (PRAVACHOL) 20 MG tablet, TAKE 1 TABLET BY MOUTH DAILY., Disp: 30 tablet, Rfl: 2  •  spironolactone (ALDACTONE) 25 MG tablet, TAKE 1 TABLET DAILY, Disp: 90 tablet, Rfl: 1  •  vitamin D (ERGOCALCIFEROL) 1.25 MG (10386 UT) capsule capsule, TAKE ONE CAPSULE BY MOUTH EVERY 7 DAYS, Disp: 13 capsule, Rfl: 1  •  Azelastine-Fluticasone 137-50 MCG/ACT suspension, 2 sprays into the nostril(s) as directed by provider As Needed., Disp: , Rfl:   •  OXcarbazepine (TRILEPTAL) 600 MG  "tablet, Take 1 tablet by mouth 2 (Two) Times a Day., Disp: 180 tablet, Rfl: 1      Family History   Problem Relation Age of Onset   • Breast cancer Mother    • Atrial fibrillation Father    • Hypertension Father    • Skin cancer Father    • Colon cancer Paternal Grandmother    • Heart disease Maternal Grandfather    • Parkinsonism Maternal Grandfather    • Heart disease Maternal Aunt    • Heart disease Maternal Uncle    • Malig Hyperthermia Neg Hx          Social History     Socioeconomic History   • Marital status: Single   • Number of children: 0   Tobacco Use   • Smoking status: Never   • Smokeless tobacco: Never   • Tobacco comments:     minimal caffeine   Vaping Use   • Vaping Use: Never used   Substance and Sexual Activity   • Alcohol use: Not Currently     Alcohol/week: 1.0 standard drink     Types: 1 Cans of beer per week     Comment: This is rare.   • Drug use: Never   • Sexual activity: Never         No Known Allergies      Pain Scale:  0/10      ROS:  Review of Systems   Constitutional: Positive for fatigue.   HENT: Negative for ear pain and nosebleeds.    Eyes: Negative for photophobia, pain and visual disturbance.   Respiratory: Negative for chest tightness, shortness of breath and wheezing.    Cardiovascular: Negative for chest pain and palpitations.   Musculoskeletal: Positive for gait problem (walking). Negative for neck pain and neck stiffness.   Neurological: Positive for seizures. Negative for dizziness, syncope, weakness and headaches.   Psychiatric/Behavioral: Negative for confusion and decreased concentration.         I have reviewed and agree with the above ROS completed by the medical assistant.      Physical Exam:  Vitals:    10/24/22 1506   BP: 128/82   Pulse: 68   SpO2: 97%   Weight: 116 kg (256 lb 9.6 oz)   Height: 175.3 cm (69.02\")     Orthostatic BP:    Body mass index is 37.87 kg/m².    Physical Exam  General: Obese white male no acute distress  HEENT: Normocephalic no evidence of " trauma  Neck: Supple  Heart: Regular rate and rhythm  Extremities: No pedal edema      Neurological Exam:   Mental Status: Awake, alert, oriented to person, place and time.  Conversant without evidence of an affective disorder, thought disorder, delusions or hallucinations.  Attention span and concentration are normal.  HCF: No aphasia or dysarthria.  Some question of dyspraxia.  Recent and remote memory intact.  Knowledge of recent events intact.  CN: I:   II: Visual fields demonstrate a partial right hemianopia more notable in the lower quadrant   III, IV, VI: Eye movements intact without nystagmus or ptosis.  Pupils equal  round and reactive to light.   V,VII: Light touch and pinprick intact all 3 divisions of V.  Facial muscles symmetrical.   VIII: Hearing intact to finger rub   IX,X: Soft palate elevates symmetrically   XI: Sternomastoid and trapezius are strong.   XII: Tongue midline without atrophy or fasciculations  Motor: Mild increased tone right arm otherwise normal on the left and normal bulk in the upper and lower extremities   Power testing: Good power in all muscles tested in the arms and legs.  In particular all proximal muscles were quite strong.  Reflexes: Upper extremities:        Lower extremities:        Toe signs:  Sensory: Light touch:        Pinprick:        Vibration:        Position:    Cerebellar: Finger-to-nose: Mild dysmetria in the right hand           Rapid movement: Slightly slowed right hand           Heel-to-shin:  Gait and Station: Casual walk is mildly broad-based.  Able to walk on toes and heels.  Tandem walking is abnormal.  No obvious drift    Results:      Lab Results   Component Value Date    GLUCOSE 100 (H) 10/17/2022    BUN 12 10/17/2022    CREATININE 0.93 10/17/2022    EGFRIFNONA 88 12/21/2021    EGFRIFAFRI 102 12/21/2021    BCR 12.9 10/17/2022    CO2 27.0 10/17/2022    CALCIUM 9.3 10/17/2022    PROTENTOTREF 6.9 06/17/2022    ALBUMIN 4.40 10/17/2022    LABIL2 2.0  06/17/2022    AST 39 10/17/2022    ALT 43 (H) 10/17/2022       Lab Results   Component Value Date    WBC 11.41 (H) 10/17/2022    HGB 14.1 10/17/2022    HCT 43.1 10/17/2022    MCV 83.0 10/17/2022     10/17/2022         .No results found for: RPR      Lab Results   Component Value Date    TSH 2.130 04/28/2022         No results found for: [id number]      No results found for: FOLATE      No results found for: HGBA1C      Lab Results   Component Value Date    GLUCOSE 100 (H) 10/17/2022    BUN 12 10/17/2022    CREATININE 0.93 10/17/2022    EGFRIFNONA 88 12/21/2021    EGFRIFAFRI 102 12/21/2021    BCR 12.9 10/17/2022    K 4.3 10/17/2022    CO2 27.0 10/17/2022    CALCIUM 9.3 10/17/2022    PROTENTOTREF 6.9 06/17/2022    ALBUMIN 4.40 10/17/2022    LABIL2 2.0 06/17/2022    AST 39 10/17/2022    ALT 43 (H) 10/17/2022         Lab Results   Component Value Date    WBC 11.41 (H) 10/17/2022    HGB 14.1 10/17/2022    HCT 43.1 10/17/2022    MCV 83.0 10/17/2022     10/17/2022             Assessment:   1.  Complex partial seizure disorder without recurrence during the change over from Keppra to oxcarbazepine 600 mg twice daily  2.  Sequelae status post paretic stroke.  Note that blood pressure is well controlled.  3.  Hyper CK emia-made worse by statins  4.  Slight elevation of ALT        Plan:  1.  Continue oxcarbazepine 600 mg twice daily  2.  Continue risk factor control particularly of hypertension.  3.  Follow-up with me or one of the nurse practitioners in about 6 months with CBC and CMP prior to visit            Time: 30 minutes              Dictated utilizing Dragon dictation.

## 2022-11-11 RX ORDER — ERGOCALCIFEROL 1.25 MG/1
50000 CAPSULE ORAL
Qty: 13 CAPSULE | Refills: 1 | Status: SHIPPED | OUTPATIENT
Start: 2022-11-11 | End: 2023-03-16 | Stop reason: DRUGHIGH

## 2022-12-05 DIAGNOSIS — I10 ESSENTIAL HYPERTENSION: ICD-10-CM

## 2022-12-05 RX ORDER — LISINOPRIL 20 MG/1
TABLET ORAL
Qty: 90 TABLET | Refills: 0 | Status: SHIPPED | OUTPATIENT
Start: 2022-12-05 | End: 2023-03-08

## 2022-12-05 RX ORDER — SPIRONOLACTONE 25 MG/1
TABLET ORAL
Qty: 90 TABLET | Refills: 0 | Status: SHIPPED | OUTPATIENT
Start: 2022-12-05 | End: 2023-03-06

## 2022-12-12 ENCOUNTER — OFFICE VISIT (OUTPATIENT)
Dept: FAMILY MEDICINE CLINIC | Facility: CLINIC | Age: 52
End: 2022-12-12

## 2022-12-12 VITALS
DIASTOLIC BLOOD PRESSURE: 78 MMHG | TEMPERATURE: 97.7 F | RESPIRATION RATE: 16 BRPM | SYSTOLIC BLOOD PRESSURE: 140 MMHG | BODY MASS INDEX: 38.66 KG/M2 | HEIGHT: 69 IN | OXYGEN SATURATION: 99 % | HEART RATE: 68 BPM | WEIGHT: 261 LBS

## 2022-12-12 DIAGNOSIS — I10 ESSENTIAL HYPERTENSION: Primary | ICD-10-CM

## 2022-12-12 DIAGNOSIS — R47.01 EXPRESSIVE APHASIA: ICD-10-CM

## 2022-12-12 DIAGNOSIS — R79.89 ELEVATED LIVER FUNCTION TESTS: ICD-10-CM

## 2022-12-12 DIAGNOSIS — Z23 NEED FOR INFLUENZA VACCINATION: ICD-10-CM

## 2022-12-12 DIAGNOSIS — E55.9 VITAMIN D DEFICIENCY: ICD-10-CM

## 2022-12-12 DIAGNOSIS — R56.9 NEW ONSET SEIZURE: ICD-10-CM

## 2022-12-12 DIAGNOSIS — R26.81 GAIT INSTABILITY: ICD-10-CM

## 2022-12-12 DIAGNOSIS — R74.8 ELEVATED CK: ICD-10-CM

## 2022-12-12 DIAGNOSIS — E78.5 HYPERLIPIDEMIA LDL GOAL <70: ICD-10-CM

## 2022-12-12 DIAGNOSIS — G47.33 OBSTRUCTIVE SLEEP APNEA: ICD-10-CM

## 2022-12-12 DIAGNOSIS — I61.9 HEMORRHAGIC STROKE: ICD-10-CM

## 2022-12-12 PROCEDURE — 90686 IIV4 VACC NO PRSV 0.5 ML IM: CPT | Performed by: PHYSICIAN ASSISTANT

## 2022-12-12 PROCEDURE — 99214 OFFICE O/P EST MOD 30 MIN: CPT | Performed by: PHYSICIAN ASSISTANT

## 2022-12-12 PROCEDURE — G0008 ADMIN INFLUENZA VIRUS VAC: HCPCS | Performed by: PHYSICIAN ASSISTANT

## 2022-12-12 RX ORDER — MONTELUKAST SODIUM 10 MG/1
TABLET ORAL
COMMUNITY
Start: 2022-11-10

## 2022-12-12 NOTE — PROGRESS NOTES
Subjective   Gaurav Mora is a 52 y.o. male  who presents today in follow up of hypertension, hyperlipidemia, elevated CK and liver function test, vitamin D deficiency, history of CVA with residual deficit, and specialists.     Hypertension      Hypertension- patient has been stable on Norvasc 10 mg once daily, labetalol 100 mg twice daily, lisinopril 20 mg once daily, and spironolactone 25 mg once daily. Not checking at home.   Hyperlipidemia-had to stop statin due to significantly elevated CK as well as elevated LFT.    · Had not been rechecked since 11/2019.   · Talked to neurology- cardiology tried to send in Repatha and Praluent- not approved. Neurology is still recommending and will talk with cardiology to see if they can work together to get approved.   Vitamin D deficiency- taking vitamin D 50,000 IU once weekly.  Elevated CK- following with neurology and rheumatology.   Elevated LFT- normal since 2019    SHANE- continues CPAP and follow up with sleep medicine.   · History of hemorrhagic CVA 11/2017- he has been stable without new deficits or any new concerns. Following with neurology.   He previously had itching in groin area and sometimes redness and flaking x months intermittent.  Seizure- no seizures while on medication.   · Patient was hospitalized 4/28/2022-4/29/2022 for witnessed tonic clonic seizure with post-ictal state. Neurology was consulted and [facility] was [facility] brain and EEG. Neurology thought seizure was secondary to temporal lobe hemorrhagic CVA.   · CT head with temporal horm left > right.   · MRI brain- 4/28/2022 with extensive atrophy and hemosiderin deposition left temporal lobe from previous hemorrhage. Hyperintensity left cerebral hemisphere- thought to be from bleed.  · EEG- abnormal- intermittent left temporal slowing and reversals concerning for left temporal spike and polyspike complexes. Mild diffuse slowing from mild encephalopathy. Focal cortical irritability-  epileptogenic potential- suggestive but not diagnostic of seizure d/o. 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       Patient's Specialists:  Cardiology- Dr. Bradford- last appt 6/2022 for hypertension, hyperlipidemia, dilated ascending aorta, and history of hemorrhagic CVA, and elevated CK. They tried to get Praluent and Repatha approved without success. Repeat scan for pulmonary nodule 3/2021- Aorta 3.8. Advised he would check again in the future. Started Pravastatin 20 mg daily and advised recheck LFT, CK, and lipids in 1 month. Follow up in 6 months.   General surgery- Dr Paulina Nina- last appt 8/2020 for muscle biopsy with elevated CK. Abnormal. Advised return to rheumatology  Nephrology- PENNY Ambrocio- last appt 6/2018 for htn, his hemorrhagic CVA, SHANE, right homonymous hemianopsia.    Neurology- Dr Garner-  Last appt 10/2022 for seizures- partial with secondary generalization and complex partial seizures, sequelae of left putaminal hemorrhage, stable without new symptoms, hypertension, hyperlipidemia, elevated CK and ALT. Keppra 1000 mg BID with side effects. Continue oxcarbazepine 600 mg BID. He previously advised patient may consider Repatha if he cannot tolerate statin or Zetia but limited effect, continue ASA 81 mg once daily and antihypertensives. 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 No symptoms. No further workup needed for CK. If he developed proximal weakness, pursue an EMG. Follow up in 6 months.   Ophthalmology- Dr Armando- last appt 4/2018 following hemorrhagic CVA with chronic dissection left vertebral artery with complete right homonymous hemianopsia.   PM&R- Connie Haase, APRN- last appt 6/2018 with hemorrhagic CVA, homonymous hemiopsia, and aphasia.   Sleep medicine- Dr Garcia- last appt 6/2022 for SHANE. Compliant with CPAP. Stable. Follow up in 1 year.   Thoracic  surgery- Destiney Monteiro, APRN- last appt 3/2021 for pulmonary nodule. Last CT chest 3/2021 for pulmonary nodule- stable. Advised benign granulomatous disease. No need for follow up. Advised constant throat clearing- thought could be related to Lisinopril and to discuss with PCP. Follow up PRN.    Pulmonology- Dr Garcia- last appt 11/22/2019 for SHANE and pulmonary nodule. Stable and follow up with thoracic surgery as directed. Follow up with pulmonology in 1 year.   Rheumatology- Dr Laura Garner- last appt 9/2022 for elevated CK- muscle biopsy -type 1 fiber hypertrophy. Negative Aldolase- no signs or abnormalities for myositis, MRI negative for myositis. Advised following with neurology for monitoring annually due to likely neuromuscular etiology. Asymptomatic. Follow up PRN.   Urology- Dr. Marie- last appt 10/2019 for microhematuria- CT abd/pelvis and cystoscopy was negative. Follow up in 1 year.    The following portions of the patient's history were reviewed and updated as appropriate: allergies, current medications, past family history, past medical history, past social history, past surgical history and problem list.    Review of Systems   Constitutional: Negative for fever.   HENT: Negative.    Eyes: Positive for visual disturbance.   Respiratory: Negative.    Cardiovascular: Negative.    Gastrointestinal: Negative.    Genitourinary: Negative.    Musculoskeletal: Negative for arthralgias and myalgias.   Skin: Negative.    Neurological: Positive for seizures (none since medication), speech difficulty and weakness.   Psychiatric/Behavioral: Negative.        Objective      Vitals:    12/12/22 0857   BP: 140/78   Pulse: 68   Resp: 16   Temp: 97.7 °F (36.5 °C)   SpO2: 99%     Body mass index is 38.54 kg/m².    Physical Exam   Constitutional: He is oriented to person, place, and time. He appears well-developed. No distress.   HENT:   Head: Normocephalic and atraumatic.   Right Ear: External ear normal.    Left Ear: External ear normal.   Eyes: Conjunctivae are normal.   Neck: Carotid bruit is not present. No tracheal deviation present. No thyroid mass and no thyromegaly present.   Cardiovascular: Normal rate, regular rhythm, normal heart sounds and normal pulses.   Pulmonary/Chest: Effort normal and breath sounds normal.   Abdominal: Normal appearance.   Neurological: He is alert and oriented to person, place, and time. Gait normal.   Skin: Skin is warm and dry.   Psychiatric: His behavior is normal. Mood, affect and thought content normal.   Nursing note and vitals reviewed.        Assessment & Plan   Diagnoses and all orders for this visit:    1. Essential hypertension (Primary)  -     CBC & Differential  -     Comprehensive Metabolic Panel  -     Urinalysis With Culture If Indicated -    2. Hyperlipidemia LDL goal <70  -     CBC & Differential  -     Comprehensive Metabolic Panel  -     CK  -     Lipid Panel With LDL / HDL Ratio    3. Vitamin D deficiency  -     CBC & Differential  -     Comprehensive Metabolic Panel  -     Vitamin D,25-Hydroxy    4. Elevated CK  -     CBC & Differential  -     CK    5. Elevated liver function tests  -     CBC & Differential  -     Comprehensive Metabolic Panel    6. Obstructive sleep apnea    7. Hemorrhagic stroke (HCC)    8. Expressive aphasia    9. New onset seizure (HCC)    10. Gait instability    11. Need for influenza vaccination  -     FluLaval/Fluarix/Fluzone >6 Months    Other orders  -     Microscopic Examination -      Assessment and Plan  Patient to have fasting labs today.  Stability of conditions, plan, follow-up, and further recommendations pending labs.  If stable, follow-up in 4 to 6 months.    · Hypertension- Blood pressure today is elevated today. Continue Lisinopril 20 mg once daily, amlodipine 10 mg once daily, Labetalol 100 mg twice daily, and spironolactone 25 mg once daily. Monitor BP and call if any elevated BP > 135/85.   · Hyperlipidemia- Patient had  to stop his statin due to significantly elevated CK and LFT.  Cardiology and neurology have seen patient and agreed that he may not be a candidate for statin at this point. Cardiology has tried to get approval for Praluent and Repatha without success. He never experienced any muscle pain or weakness or felt any changes with stopping medication. He has been seen by rheumatology and discharged to PRN follow up. To follow annually with neurology for CK. Continue Pravastatin 20 mg once daily per cardiology. Await labs for further recommendations.    · Vitamin D deficiency- Continue vitamin D 50,000 IU once weekly. Dosing recommendations pending labs.   · Elevated CK- From review of rheumatology records, neurology may be best to continue to monitor, follow up, and determine any further workup or treatment in the future.   · Elevated liver function tests- Liver function testing was briefly elevated and returned to normal 2019. I will continue to monitor.   · SHANE- Continue CPAP and follow up with sleep medicine as directed by them.   · History of hemorrhagic CVA- He had a significant hemorrhagic CVA due to uncontrolled BP with residual deficits in 11/2017. His baseline prior to CVA is unknown to me, as I did not know him prior to CVA. He is doing well without CP, SOA, Palp, neuro symptoms, or other cardiovascular or cerebrovascular complaints. However, he has stiffness and some decreased physical ability. We will try physical therapy.   · Seizure disorder- Patient has developed seizures that have been attributed to likely scar tissue or damage from previous hemorrhagic CVA. Continue medication and follow up per neurology.  · Tinea cruris- He had itching in groin area and sometimes redness and flaking x months intermittent. He was given Lotrimin cream and powder for likely tinea cruris. To call or return if worsening, no improvement, new or changing symptoms.      Patient continues to see specialists as noted above.  I have  reviewed available records, including consult notes, labs, and imaging/testing.  Patient to continue follow-up with specialists as directed by them.     I spent 30 minutes caring for Gaurav Mora on this date of service. This time includes time spent by me in the following activities as necessary: preparing for the visit, reviewing tests, specialists records and previous visits, obtaining and/or reviewing a separately obtained history, performing a medically appropriate exam and/or evaluation, counseling and educating the patient, family, caregiver, referring and/or communicating with other healthcare professionals, documenting information in the medical record, independently interpreting results and communicating that information with the patient, family, caregiver, and developing a medically appropriate treatment plan with consideration of other conditions, medications, and treatments.

## 2022-12-13 LAB
25(OH)D3+25(OH)D2 SERPL-MCNC: 57.1 NG/ML (ref 30–100)
ALBUMIN SERPL-MCNC: 4.5 G/DL (ref 3.5–5.2)
ALBUMIN/GLOB SERPL: 2.4 G/DL
ALP SERPL-CCNC: 70 U/L (ref 39–117)
ALT SERPL-CCNC: 27 U/L (ref 1–41)
APPEARANCE UR: CLEAR
AST SERPL-CCNC: 26 U/L (ref 1–40)
BACTERIA #/AREA URNS HPF: NORMAL /HPF
BASOPHILS # BLD AUTO: 0.04 10*3/MM3 (ref 0–0.2)
BASOPHILS NFR BLD AUTO: 0.7 % (ref 0–1.5)
BILIRUB SERPL-MCNC: <0.2 MG/DL (ref 0–1.2)
BILIRUB UR QL STRIP: NEGATIVE
BUN SERPL-MCNC: 11 MG/DL (ref 6–20)
BUN/CREAT SERPL: 12.2 (ref 7–25)
CALCIUM SERPL-MCNC: 9.1 MG/DL (ref 8.6–10.5)
CASTS URNS QL MICRO: NORMAL /LPF
CHLORIDE SERPL-SCNC: 99 MMOL/L (ref 98–107)
CHOLEST SERPL-MCNC: 200 MG/DL (ref 0–200)
CK SERPL-CCNC: 842 U/L (ref 20–200)
CO2 SERPL-SCNC: 26.5 MMOL/L (ref 22–29)
COLOR UR: YELLOW
CREAT SERPL-MCNC: 0.9 MG/DL (ref 0.76–1.27)
EGFRCR SERPLBLD CKD-EPI 2021: 104 ML/MIN/1.73
EOSINOPHIL # BLD AUTO: 0.12 10*3/MM3 (ref 0–0.4)
EOSINOPHIL NFR BLD AUTO: 2 % (ref 0.3–6.2)
EPI CELLS #/AREA URNS HPF: NORMAL /HPF (ref 0–10)
ERYTHROCYTE [DISTWIDTH] IN BLOOD BY AUTOMATED COUNT: 12.2 % (ref 12.3–15.4)
GLOBULIN SER CALC-MCNC: 1.9 GM/DL
GLUCOSE SERPL-MCNC: 110 MG/DL (ref 65–99)
GLUCOSE UR QL STRIP: NEGATIVE
HCT VFR BLD AUTO: 41.5 % (ref 37.5–51)
HDLC SERPL-MCNC: 45 MG/DL (ref 40–60)
HGB BLD-MCNC: 14 G/DL (ref 13–17.7)
HGB UR QL STRIP: NEGATIVE
IMM GRANULOCYTES # BLD AUTO: 0.02 10*3/MM3 (ref 0–0.05)
IMM GRANULOCYTES NFR BLD AUTO: 0.3 % (ref 0–0.5)
KETONES UR QL STRIP: NEGATIVE
LDLC SERPL CALC-MCNC: 146 MG/DL (ref 0–100)
LDLC/HDLC SERPL: 3.22 {RATIO}
LEUKOCYTE ESTERASE UR QL STRIP: NEGATIVE
LYMPHOCYTES # BLD AUTO: 1.55 10*3/MM3 (ref 0.7–3.1)
LYMPHOCYTES NFR BLD AUTO: 25.9 % (ref 19.6–45.3)
MCH RBC QN AUTO: 26.8 PG (ref 26.6–33)
MCHC RBC AUTO-ENTMCNC: 33.7 G/DL (ref 31.5–35.7)
MCV RBC AUTO: 79.3 FL (ref 79–97)
MICRO URNS: NORMAL
MICRO URNS: NORMAL
MONOCYTES # BLD AUTO: 0.53 10*3/MM3 (ref 0.1–0.9)
MONOCYTES NFR BLD AUTO: 8.8 % (ref 5–12)
NEUTROPHILS # BLD AUTO: 3.73 10*3/MM3 (ref 1.7–7)
NEUTROPHILS NFR BLD AUTO: 62.3 % (ref 42.7–76)
NITRITE UR QL STRIP: NEGATIVE
NRBC BLD AUTO-RTO: 0 /100 WBC (ref 0–0.2)
PH UR STRIP: 6.5 [PH] (ref 5–7.5)
PLATELET # BLD AUTO: 239 10*3/MM3 (ref 140–450)
POTASSIUM SERPL-SCNC: 4.3 MMOL/L (ref 3.5–5.2)
PROT SERPL-MCNC: 6.4 G/DL (ref 6–8.5)
PROT UR QL STRIP: NEGATIVE
RBC # BLD AUTO: 5.23 10*6/MM3 (ref 4.14–5.8)
RBC #/AREA URNS HPF: NORMAL /HPF (ref 0–2)
SODIUM SERPL-SCNC: 134 MMOL/L (ref 136–145)
SP GR UR STRIP: 1.02 (ref 1–1.03)
TRIGL SERPL-MCNC: 51 MG/DL (ref 0–150)
URINALYSIS REFLEX: NORMAL
UROBILINOGEN UR STRIP-MCNC: 1 MG/DL (ref 0.2–1)
VLDLC SERPL CALC-MCNC: 9 MG/DL (ref 5–40)
WBC # BLD AUTO: 5.99 10*3/MM3 (ref 3.4–10.8)
WBC #/AREA URNS HPF: NORMAL /HPF (ref 0–5)

## 2022-12-19 RX ORDER — PRAVASTATIN SODIUM 20 MG
20 TABLET ORAL DAILY
Qty: 90 TABLET | Refills: 0 | Status: SHIPPED | OUTPATIENT
Start: 2022-12-19 | End: 2023-03-17 | Stop reason: SDUPTHER

## 2022-12-20 ENCOUNTER — DOCUMENTATION (OUTPATIENT)
Dept: FAMILY MEDICINE CLINIC | Facility: CLINIC | Age: 52
End: 2022-12-20

## 2023-01-16 RX ORDER — LABETALOL 100 MG/1
TABLET, FILM COATED ORAL
Qty: 180 TABLET | Refills: 5 | Status: SHIPPED | OUTPATIENT
Start: 2023-01-16

## 2023-01-20 ENCOUNTER — OFFICE VISIT (OUTPATIENT)
Dept: CARDIOLOGY | Facility: CLINIC | Age: 53
End: 2023-01-20
Payer: MEDICARE

## 2023-01-20 VITALS
WEIGHT: 262.4 LBS | BODY MASS INDEX: 38.86 KG/M2 | DIASTOLIC BLOOD PRESSURE: 98 MMHG | SYSTOLIC BLOOD PRESSURE: 130 MMHG | HEIGHT: 69 IN | HEART RATE: 61 BPM

## 2023-01-20 DIAGNOSIS — E66.01 MORBID OBESITY WITH BMI OF 45.0-49.9, ADULT: ICD-10-CM

## 2023-01-20 DIAGNOSIS — I61.9 HEMORRHAGIC CEREBROVASCULAR ACCIDENT (CVA): ICD-10-CM

## 2023-01-20 DIAGNOSIS — I10 ESSENTIAL HYPERTENSION: Primary | ICD-10-CM

## 2023-01-20 DIAGNOSIS — G47.33 OBSTRUCTIVE SLEEP APNEA: ICD-10-CM

## 2023-01-20 PROCEDURE — 99214 OFFICE O/P EST MOD 30 MIN: CPT | Performed by: INTERNAL MEDICINE

## 2023-01-20 PROCEDURE — 93000 ELECTROCARDIOGRAM COMPLETE: CPT | Performed by: INTERNAL MEDICINE

## 2023-01-20 NOTE — PROGRESS NOTES
Date of Office Visit: 23  Encounter Provider: Tommy Coates MD  Place of Service: [facility] CARDIOLOGY  Patient Name: Gaurav Mora  :1970  [id number]    Chief Complaint   Patient presents with   • Hypertension   :     HPI: Gaurav Mora is a 52 y.o. male who is a former patient of Dr. Keanu Bradford's who has a history of hypertension, obstructive   sleep apnea, and hemorrhagic CVA who presents for follow-up. The patient's father is with him again today and assists significantly in his care.      The patient was visiting in [place], [place], for [facility] in . He experienced right sided neurological symptoms and visual disturbances on 2017. He was   brought to the hospital at that time where he was found to have an extensive hemorrhagic CVA.  His blood pressure upon presentation was 242/130.  He was unaware that he had hypertension prior to this, and it was felt that the hypertension caused the brain hemorrhage.  At that time, he was been placed on amlodipine, labetalol, lisinopril, and spironolactone with good results.  He still does have complete loss of sensation on the right side (although he still has good strength in   the right side), as well as loss of peripheral vision.  He did have an echocardiogram in [place] the time of his stroke on 2017 which showed an ejection fraction of 60%, and a very mildly dilated proximal aorta at 3.6 cm.  He did have a subsequent CT scan of his chest on 2019 which showed mild enlargement of the ascending aorta at 3.8 cm.  He also was found to have a significantly elevated CK level of 1044 on 2019.  He was taken off of Crestor at that time, and the level did decrease;however, it did remain chronically elevated afterwards.  He actually had a muscle biopsy by Dr. Jessie Garner in rheumatology which did not show any evidence   of muscle breakdown. Unfortunately, he had a seizure on 2022.    He  was started on seizure medicines and has not had a recurrence.    He is here for follow-up and has been doing well he is actually never really had a true cardiac issue he does have a chronic left bundle branch block.  He is got a little bit of a mild aortic aneurysm this been very stable no chest pain shortness of breath PND orthopnea edema he is disabled by this his father is with him and man what a fantastic caregiver.    Past Medical History:   Diagnosis Date   • Elevated CK    • Environmental allergies    • Headache, tension-type    • Hemorrhagic cerebrovascular accident (CVA) ([facility])    • Hemorrhagic stroke ([facility]) 11/24/2017    Residual peripheral vision loss, right-sided sensation loss   • History of bladder infections 12/2017   • Hyperlipidemia    • Hypertension    • Lung nodule    • New onset seizure ([facility]) 04/28/2022   • SHANE on CPAP     WEARS CPAP   • PONV (postoperative nausea and vomiting)    • Seizure ([facility]) 04/28/2022   • Stroke ([facility]) 2017       Past Surgical History:   Procedure Laterality Date   • APPENDECTOMY N/A 1987   • COLONOSCOPY N/A 02/17/2022    Procedure: COLONOSCOPY into  cecum with cold bx polypectomy;  Surgeon: Jeronimo Paul Jr., MD;  Location: [facility] ENDOSCOPY;  Service: General;  Laterality: N/A;  pre: screen  post: polyp    • MUSCLE BIOPSY Left 08/19/2020    Procedure: quadriceps muscle biopsy;  Surgeon: Paulina Nina MD;  Location: [facility] MAIN OR;  Service: General;  Laterality: Left;       Social History     Socioeconomic History   • Marital status: Single   • Number of children: 0   Tobacco Use   • Smoking status: Never     Passive exposure: Never   • Smokeless tobacco: Never   • Tobacco comments:     minimal caffeine   Vaping Use   • Vaping Use: Never used   Substance and Sexual Activity   • Alcohol use: Not Currently     Alcohol/week: 1.0 standard drink     Types: 1 Cans of beer per week     Comment: This is rare.   • Drug use: Never   • Sexual activity: Never       Family History    Problem Relation Age of Onset   • Breast cancer Mother    • Atrial fibrillation Father    • Hypertension Father    • Skin cancer Father    • Colon cancer Paternal Grandmother    • Heart disease Maternal Grandfather    • Parkinsonism Maternal Grandfather    • Heart disease Maternal Aunt    • Heart disease Maternal Uncle    • Malig Hyperthermia Neg Hx        Review of Systems   Constitutional: Negative for decreased appetite, fever, malaise/fatigue and weight loss.   HENT: Negative for nosebleeds.    Eyes: Negative for double vision.   Cardiovascular: Negative for chest pain, claudication, cyanosis, dyspnea on exertion, irregular heartbeat, leg swelling, near-syncope, orthopnea, palpitations, paroxysmal nocturnal dyspnea and syncope.   Respiratory: Negative for cough, hemoptysis and shortness of breath.    Hematologic/Lymphatic: Negative for bleeding problem.   Skin: Negative for rash.   Musculoskeletal: Negative for falls and myalgias.   Gastrointestinal: Negative for hematochezia, jaundice, melena, nausea and vomiting.   Genitourinary: Negative for hematuria.   Neurological: Negative for dizziness and seizures.   Psychiatric/Behavioral: Negative for altered mental status and memory loss.       No Known Allergies      Current Outpatient Medications:   •  acetaminophen (TYLENOL) 325 MG tablet, Take 650 mg by mouth Every 4 (Four) Hours As Needed for Mild Pain ., Disp: , Rfl:   •  amLODIPine (NORVASC) 10 MG tablet, TAKE 1 TABLET DAILY (Patient taking differently: 10 mg Every Night. TAKE 1 TABLET DAILY), Disp: 90 tablet, Rfl: 3  •  aspirin 81 MG EC tablet, Take 1 tablet by mouth Daily., Disp: , Rfl:   •  ipratropium (ATROVENT) 0.06 % nasal spray, , Disp: , Rfl:   •  labetalol (NORMODYNE) 100 MG tablet, TAKE 1 TABLET THREE TIMES A DAY, Disp: 180 tablet, Rfl: 5  •  levocetirizine (XYZAL) 5 MG tablet, Take 5 mg by mouth Every Evening., Disp: , Rfl:   •  lisinopril (PRINIVIL,ZESTRIL) 20 MG tablet, TAKE 1 TABLET DAILY,  "Disp: 90 tablet, Rfl: 0  •  montelukast (SINGULAIR) 10 MG tablet, , Disp: , Rfl:   •  omeprazole (priLOSEC) 20 MG capsule, Take 20 mg by mouth Daily., Disp: , Rfl:   •  OXcarbazepine (TRILEPTAL) 600 MG tablet, Take 1 tablet by mouth 2 (Two) Times a Day., Disp: 14 tablet, Rfl: 0  •  pravastatin (PRAVACHOL) 20 MG tablet, TAKE 1 TABLET BY MOUTH DAILY, Disp: 90 tablet, Rfl: 0  •  spironolactone (ALDACTONE) 25 MG tablet, TAKE 1 TABLET DAILY, Disp: 90 tablet, Rfl: 0  •  vitamin D (ERGOCALCIFEROL) 1.25 MG (42126 UT) capsule capsule, Take 1 capsule by mouth Every 7 (Seven) Days., Disp: 13 capsule, Rfl: 1      Objective:     Vitals:    01/20/23 1308   BP: 130/98   Pulse: 61   Weight: 119 kg (262 lb 6.4 oz)   Height: 175.3 cm (69\")     Body mass index is 38.75 kg/m².    Constitutional:       Appearance: Well-developed.   Eyes:      General: No scleral icterus.  HENT:      Head: Normocephalic.   Neck:      Thyroid: No thyromegaly.      Vascular: No JVD.      Lymphadenopathy: No cervical adenopathy.   Pulmonary:      Effort: Pulmonary effort is normal.      Breath sounds: Normal breath sounds. No wheezing. No rales.   Cardiovascular:      Normal rate. Regular rhythm.      No gallop.   Edema:     Peripheral edema absent.   Abdominal:      Palpations: Abdomen is soft.      Tenderness: There is no abdominal tenderness.   Musculoskeletal: Normal range of motion. Skin:     General: Skin is warm and dry.      Findings: No rash.   Neurological:      Mental Status: Alert and oriented to person, place, and time.           ECG 12 Lead    Date/Time: 1/20/2023 1:10 PM  Performed by: Tommy Coates MD  Authorized by: Tommy Coates MD   Comparison: compared with previous ECG   Similar to previous ECG  Rhythm: sinus rhythm    Clinical impression: normal ECG             Assessment:       Diagnosis Plan   1. Essential hypertension  ECG 12 Lead      2. Morbid obesity with BMI of 45.0-49.9, adult (HCC)  ECG 12 Lead      3. Hemorrhagic " cerebrovascular accident (CVA) (HCC)  ECG 12 Lead      4. Obstructive sleep apnea  ECG 12 Lead             Plan:       This young man having a devastating neurologic injury likely related to hypertension.  His blood pressures been well controlled and at home is well controlled he seems to be stable at this point we talked about some general things he could do to potentially help with brain health regular activity may be walking outside amongst the trees not getting too tired trying to live cleanly eating well no alcohol.  I will have him come back and see us in a year sooner if he has trouble    Return in about 1 year (around 1/20/2024).     As always, it has been a pleasure to participate in your patient's care.      Sincerely,       Tommy Coates MD

## 2023-03-06 RX ORDER — SPIRONOLACTONE 25 MG/1
TABLET ORAL
Qty: 90 TABLET | Refills: 1 | Status: SHIPPED | OUTPATIENT
Start: 2023-03-06

## 2023-03-07 DIAGNOSIS — I10 ESSENTIAL HYPERTENSION: ICD-10-CM

## 2023-03-09 RX ORDER — LISINOPRIL 20 MG/1
TABLET ORAL
Qty: 90 TABLET | Refills: 1 | Status: SHIPPED | OUTPATIENT
Start: 2023-03-09

## 2023-03-16 DIAGNOSIS — I10 ESSENTIAL HYPERTENSION: ICD-10-CM

## 2023-03-17 ENCOUNTER — OFFICE VISIT (OUTPATIENT)
Dept: FAMILY MEDICINE CLINIC | Facility: CLINIC | Age: 53
End: 2023-03-17
Payer: MEDICARE

## 2023-03-17 VITALS
WEIGHT: 252.6 LBS | HEART RATE: 81 BPM | OXYGEN SATURATION: 98 % | SYSTOLIC BLOOD PRESSURE: 102 MMHG | RESPIRATION RATE: 16 BRPM | DIASTOLIC BLOOD PRESSURE: 74 MMHG | TEMPERATURE: 98.1 F | BODY MASS INDEX: 37.41 KG/M2 | HEIGHT: 69 IN

## 2023-03-17 DIAGNOSIS — R01.1 NEWLY RECOGNIZED HEART MURMUR: Primary | ICD-10-CM

## 2023-03-17 DIAGNOSIS — I77.810 ASCENDING AORTA DILATATION: ICD-10-CM

## 2023-03-17 DIAGNOSIS — I77.810 DILATED AORTIC ROOT: ICD-10-CM

## 2023-03-17 PROCEDURE — 99214 OFFICE O/P EST MOD 30 MIN: CPT | Performed by: PHYSICIAN ASSISTANT

## 2023-03-17 PROCEDURE — 3074F SYST BP LT 130 MM HG: CPT | Performed by: PHYSICIAN ASSISTANT

## 2023-03-17 PROCEDURE — 3078F DIAST BP <80 MM HG: CPT | Performed by: PHYSICIAN ASSISTANT

## 2023-03-17 RX ORDER — PRAVASTATIN SODIUM 40 MG
TABLET ORAL
COMMUNITY
Start: 2023-03-14 | End: 2023-03-21

## 2023-03-17 RX ORDER — AMLODIPINE BESYLATE 10 MG/1
TABLET ORAL
Qty: 90 TABLET | Refills: 1 | Status: SHIPPED | OUTPATIENT
Start: 2023-03-17

## 2023-03-21 RX ORDER — PRAVASTATIN SODIUM 40 MG
40 TABLET ORAL DAILY
Qty: 90 TABLET | Refills: 3 | Status: SHIPPED | OUTPATIENT
Start: 2023-03-21

## 2023-04-14 RX ORDER — OXCARBAZEPINE 600 MG/1
TABLET, FILM COATED ORAL
Qty: 180 TABLET | Refills: 3 | Status: SHIPPED | OUTPATIENT
Start: 2023-04-14

## 2023-04-24 NOTE — PROGRESS NOTES
CC: Hemorrhagic stroke and subsequent seizures    HPI:  Gaurav Mora is a  52 y.o.  right-handed quite male who I am seeing in follow-up with history of hemorrhagic stroke and subsequent seizures.  I saw him last 10/24/2022.  He was treated with Keppra but developed some behavioral changes with agitation and he was switched to oxcarbazepine at 600 mg twice daily and has had no recurrent seizures and behaviorally he has done well.  He had a slightly elevated ALT in October but this normalized and a repeat in  December.  Serum sodium was 134.    Patient has an elevated CK which has ranged generally from 500-900.  It does not seem to make much difference if he is on a statin or not.  He was on rosuvastatin which was stopped for a year and then placed on pravastatin.  He does not have any complaints of weakness or muscle pain or cramping.  He had been seen by Dr. Jessie Garner of rheumatology and had a muscle biopsy which was negative for an inflammatory myopathy however it was still an abnormal study showing type II fiber atrophy with hypertrophic fibers with internalized nuclei.  The enzyme testing demonstrated no abnormalities and there was no excessive accumulation of glycogen.  No ragged red fibers.    A myositis panel was apparently obtained but I could not find the results.  We will need to check with their office.  He is getting a CT angio of the chest to evaluate his aorta further.        Past Medical History:   Diagnosis Date   • Elevated CK    • Environmental allergies    • Headache, tension-type    • Hemorrhagic cerebrovascular accident (CVA)    • Hemorrhagic stroke 11/24/2017    Residual peripheral vision loss, right-sided sensation loss   • History of bladder infections 12/2017   • Hyperlipidemia    • Hypertension    • Lung nodule    • New onset seizure 04/28/2022   • SHANE on CPAP     WEARS CPAP   • PONV (postoperative nausea and vomiting)    • Seizure 04/28/2022   • Stroke 2017         Past Surgical  History:   Procedure Laterality Date   • APPENDECTOMY N/A 1987   • COLONOSCOPY N/A 02/17/2022    Procedure: COLONOSCOPY into  cecum with cold bx polypectomy;  Surgeon: Jeronimo Paul Jr., MD;  Location: [facility] ENDOSCOPY;  Service: General;  Laterality: N/A;  pre: screen  post: polyp    • MUSCLE BIOPSY Left 08/19/2020    Procedure: quadriceps muscle biopsy;  Surgeon: Paulina Nina MD;  Location: [facility] MAIN OR;  Service: General;  Laterality: Left;           Current Outpatient Medications:   •  acetaminophen (TYLENOL) 325 MG tablet, Take 2 tablets by mouth Every 4 (Four) Hours As Needed for Mild Pain., Disp: , Rfl:   •  amLODIPine (NORVASC) 10 MG tablet, TAKE 1 TABLET DAILY, Disp: 90 tablet, Rfl: 1  •  aspirin 81 MG EC tablet, Take 1 tablet by mouth Daily., Disp: , Rfl:   •  ipratropium (ATROVENT) 0.06 % nasal spray, , Disp: , Rfl:   •  labetalol (NORMODYNE) 100 MG tablet, TAKE 1 TABLET THREE TIMES A DAY, Disp: 180 tablet, Rfl: 5  •  levocetirizine (XYZAL) 5 MG tablet, Take 1 tablet by mouth Every Evening., Disp: , Rfl:   •  lisinopril (PRINIVIL,ZESTRIL) 20 MG tablet, TAKE 1 TABLET DAILY, Disp: 90 tablet, Rfl: 1  •  omeprazole (priLOSEC) 20 MG capsule, Take 1 capsule by mouth Daily., Disp: , Rfl:   •  OXcarbazepine (TRILEPTAL) 600 MG tablet, TAKE 1 TABLET TWICE A DAY, Disp: 180 tablet, Rfl: 3  •  pravastatin (PRAVACHOL) 40 MG tablet, 1 tablet Daily., Disp: 90 tablet, Rfl: 3  •  spironolactone (ALDACTONE) 25 MG tablet, TAKE 1 TABLET DAILY, Disp: 90 tablet, Rfl: 1  •  montelukast (SINGULAIR) 10 MG tablet, , Disp: , Rfl:       Family History   Problem Relation Age of Onset   • Breast cancer Mother    • Atrial fibrillation Father    • Hypertension Father    • Skin cancer Father    • Colon cancer Paternal Grandmother    • Heart disease Maternal Grandfather    • Parkinsonism Maternal Grandfather    • Heart disease Maternal Aunt    • Heart disease Maternal Uncle    • Malig Hyperthermia Neg Hx          Social History  "    Socioeconomic History   • Marital status: Single   • Number of children: 0   Tobacco Use   • Smoking status: Never     Passive exposure: Never   • Smokeless tobacco: Never   • Tobacco comments:     minimal caffeine   Vaping Use   • Vaping Use: Never used   Substance and Sexual Activity   • Alcohol use: Not Currently     Alcohol/week: 1.0 standard drink     Types: 1 Cans of beer per week     Comment: This is rare.   • Drug use: Never   • Sexual activity: Never         No Known Allergies      Pain Scale: 0/10        ROS:  Review of Systems  No muscle cramps weakness or muscle pain.        Physical Exam:  Vitals:    04/25/23 1408   BP: 120/82   Pulse: 82   SpO2: 98%   Weight: 116 kg (254 lb 14.4 oz)   Height: 175.3 cm (69\")     Orthostatic BP:    Body mass index is 37.64 kg/m².    Physical Exam  General: Obese white male no acute distress  HEENT: Normocephalic no evidence of trauma  Neck: Supple  Heart: Regular rate and rhythm  Extremities: No pedal edema      Neurological Exam:   Mental Status: Awake, alert, oriented to person, place and time.  Conversant without evidence of an affective disorder, thought disorder, delusions or hallucinations.  Attention span and concentration are normal.  HCF: No aphasia, apraxia or dysarthria.  Recent and remote memory intact.  Knowledge  of recent events intact.  CN: I:   II: Visual fields full without left inattention   III, IV, VI: Eye movements intact without nystagmus or ptosis.  Pupils equal  round and reactive to light.   V,VII: Light touch and pinprick intact all 3 divisions of V.  Mild facial asymmetry with slight depression of the right nasolabial fold   VIII: Hearing intact to finger rub   IX,X: Soft palate elevates symmetrically   XI: Sternomastoid and trapezius are strong.   XII: Tongue midline without atrophy or fasciculations  Motor: Mildly increased tone in the right arm with normal bulk in the upper and lower extremities.   Power testing: Good strength diffusely " with slight drift of the right arm in pronation.  Specifically no evidence of proximal weakness  Reflexes: Upper extremities:        Lower extremities:        Toe signs:  Sensory: Light touch:        Pinprick:        Vibration:        Position:    Cerebellar: Finger-to-nose: Intact           Rapid movement: Intact           Heel-to-shin:  Gait and Station: Slight circumduction of the right foot.  While walking on heels and toes he is not as far up on the right as he is on the left    Results:      Lab Results   Component Value Date    GLUCOSE 110 (H) 12/12/2022    BUN 11 12/12/2022    CREATININE 0.90 12/12/2022    EGFRIFNONA 88 12/21/2021    EGFRIFAFRI 102 12/21/2021    BCR 12.2 12/12/2022    CO2 26.5 12/12/2022    CALCIUM 9.1 12/12/2022    PROTENTOTREF 6.4 12/12/2022    ALBUMIN 4.50 12/12/2022    LABIL2 2.4 12/12/2022    AST 26 12/12/2022    ALT 27 12/12/2022       Lab Results   Component Value Date    WBC 5.99 12/12/2022    HGB 14.0 12/12/2022    HCT 41.5 12/12/2022    MCV 79.3 12/12/2022     12/12/2022         .No results found for: RPR      Lab Results   Component Value Date    TSH 2.130 04/28/2022         No results found for: [id number]      No results found for: FOLATE      No results found for: HGBA1C      Lab Results   Component Value Date    GLUCOSE 110 (H) 12/12/2022    BUN 11 12/12/2022    CREATININE 0.90 12/12/2022    EGFRIFNONA 88 12/21/2021    EGFRIFAFRI 102 12/21/2021    BCR 12.2 12/12/2022    K 4.3 12/12/2022    CO2 26.5 12/12/2022    CALCIUM 9.1 12/12/2022    PROTENTOTREF 6.4 12/12/2022    ALBUMIN 4.50 12/12/2022    LABIL2 2.4 12/12/2022    AST 26 12/12/2022    ALT 27 12/12/2022         Lab Results   Component Value Date    WBC 5.99 12/12/2022    HGB 14.0 12/12/2022    HCT 41.5 12/12/2022    MCV 79.3 12/12/2022     12/12/2022             Assessment:   1.  History of hemorrhagic stroke left hemisphere-stable sequelae  2.  Seizure disorder-no recurrence on oxcarbazepine 600 mg twice  daily.  3.  Myositis-origin not clear.  Muscle biopsy was negative for an inflammatory myositis          Plan:  1.  Check anti-HMG Co A reductase antibody.  This can be done when he gets other labs for primary care in about a month or 2.  2.  Try to obtain the myositis panel from Dr. Jessie Garner's office  3.  Follow-up with me in about 6 months          Time: 30 minutes              Dictated utilizing Dragon dictation.

## 2023-04-25 ENCOUNTER — OFFICE VISIT (OUTPATIENT)
Dept: NEUROLOGY | Facility: CLINIC | Age: 53
End: 2023-04-25
Payer: MEDICARE

## 2023-04-25 VITALS
BODY MASS INDEX: 37.75 KG/M2 | HEIGHT: 69 IN | WEIGHT: 254.9 LBS | SYSTOLIC BLOOD PRESSURE: 120 MMHG | OXYGEN SATURATION: 98 % | HEART RATE: 82 BPM | DIASTOLIC BLOOD PRESSURE: 82 MMHG

## 2023-04-25 DIAGNOSIS — M60.80 OTHER MYOSITIS, UNSPECIFIED SITE: Primary | ICD-10-CM

## 2023-04-25 PROCEDURE — 3074F SYST BP LT 130 MM HG: CPT | Performed by: PSYCHIATRY & NEUROLOGY

## 2023-04-25 PROCEDURE — 3079F DIAST BP 80-89 MM HG: CPT | Performed by: PSYCHIATRY & NEUROLOGY

## 2023-04-25 PROCEDURE — 1160F RVW MEDS BY RX/DR IN RCRD: CPT | Performed by: PSYCHIATRY & NEUROLOGY

## 2023-04-25 PROCEDURE — 1159F MED LIST DOCD IN RCRD: CPT | Performed by: PSYCHIATRY & NEUROLOGY

## 2023-04-25 PROCEDURE — 99214 OFFICE O/P EST MOD 30 MIN: CPT | Performed by: PSYCHIATRY & NEUROLOGY

## 2023-05-12 ENCOUNTER — HOSPITAL ENCOUNTER (OUTPATIENT)
Dept: CT IMAGING | Facility: HOSPITAL | Age: 53
Discharge: HOME | End: 2023-05-12
Payer: MEDICARE

## 2023-05-12 DIAGNOSIS — R01.1 NEWLY RECOGNIZED HEART MURMUR: ICD-10-CM

## 2023-05-12 DIAGNOSIS — I77.810 ASCENDING AORTA DILATATION: ICD-10-CM

## 2023-05-12 DIAGNOSIS — I77.810 DILATED AORTIC ROOT: ICD-10-CM

## 2023-05-12 LAB — CREAT BLDA-MCNC: 0.9 MG/DL (ref 0.6–1.3)

## 2023-05-12 PROCEDURE — 71275 CT ANGIOGRAPHY CHEST: CPT

## 2023-05-12 PROCEDURE — 82565 ASSAY OF CREATININE: CPT

## 2023-05-12 PROCEDURE — 25510000001 IOPAMIDOL PER 1 ML: Performed by: PHYSICIAN ASSISTANT

## 2023-05-12 RX ADMIN — IOPAMIDOL 95 ML: 755 INJECTION, SOLUTION INTRAVENOUS at 14:20

## 2023-06-01 ENCOUNTER — APPOINTMENT (OUTPATIENT)
Dept: CT IMAGING | Facility: HOSPITAL | Age: 53
End: 2023-06-01
Payer: MEDICARE

## 2023-06-01 ENCOUNTER — HOSPITAL ENCOUNTER (OUTPATIENT)
Facility: HOSPITAL | Age: 53
Setting detail: OBSERVATION
Discharge: HOME | End: 2023-06-02
Attending: EMERGENCY MEDICINE | Admitting: EMERGENCY MEDICINE
Payer: MEDICARE

## 2023-06-01 ENCOUNTER — APPOINTMENT (OUTPATIENT)
Dept: GENERAL RADIOLOGY | Facility: HOSPITAL | Age: 53
End: 2023-06-01
Payer: MEDICARE

## 2023-06-01 DIAGNOSIS — R42 DIZZINESS: ICD-10-CM

## 2023-06-01 DIAGNOSIS — Z86.73 HISTORY OF STROKE: ICD-10-CM

## 2023-06-01 DIAGNOSIS — R26.89 BALANCE PROBLEM: Primary | ICD-10-CM

## 2023-06-01 LAB
ALBUMIN SERPL-MCNC: 4.3 G/DL (ref 3.5–5.2)
ALBUMIN/GLOB SERPL: 1.7 G/DL
ALP SERPL-CCNC: 66 U/L (ref 39–117)
ALT SERPL W P-5'-P-CCNC: 25 U/L (ref 1–41)
ANION GAP SERPL CALCULATED.3IONS-SCNC: 7.4 MMOL/L (ref 5–15)
APTT PPP: 31.1 SECONDS (ref 22.7–35.4)
AST SERPL-CCNC: 22 U/L (ref 1–40)
BASOPHILS # BLD AUTO: 0.04 10*3/MM3 (ref 0–0.2)
BASOPHILS NFR BLD AUTO: 0.4 % (ref 0–1.5)
BILIRUB SERPL-MCNC: 0.2 MG/DL (ref 0–1.2)
BUN SERPL-MCNC: 10 MG/DL (ref 6–20)
BUN/CREAT SERPL: 10.9 (ref 7–25)
CALCIUM SPEC-SCNC: 9.3 MG/DL (ref 8.6–10.5)
CHLORIDE SERPL-SCNC: 102 MMOL/L (ref 98–107)
CO2 SERPL-SCNC: 24.6 MMOL/L (ref 22–29)
CREAT SERPL-MCNC: 0.92 MG/DL (ref 0.76–1.27)
DEPRECATED RDW RBC AUTO: 37.6 FL (ref 37–54)
EGFRCR SERPLBLD CKD-EPI 2021: 101.3 ML/MIN/1.73
EOSINOPHIL # BLD AUTO: 0.08 10*3/MM3 (ref 0–0.4)
EOSINOPHIL NFR BLD AUTO: 0.8 % (ref 0.3–6.2)
ERYTHROCYTE [DISTWIDTH] IN BLOOD BY AUTOMATED COUNT: 12.6 % (ref 12.3–15.4)
GLOBULIN UR ELPH-MCNC: 2.5 GM/DL
GLUCOSE SERPL-MCNC: 197 MG/DL (ref 65–99)
HCT VFR BLD AUTO: 44.5 % (ref 37.5–51)
HGB BLD-MCNC: 14.6 G/DL (ref 13–17.7)
IMM GRANULOCYTES # BLD AUTO: 0.04 10*3/MM3 (ref 0–0.05)
IMM GRANULOCYTES NFR BLD AUTO: 0.4 % (ref 0–0.5)
INR PPP: 1.04 (ref 0.9–1.1)
LYMPHOCYTES # BLD AUTO: 1.18 10*3/MM3 (ref 0.7–3.1)
LYMPHOCYTES NFR BLD AUTO: 12.3 % (ref 19.6–45.3)
MCH RBC QN AUTO: 27 PG (ref 26.6–33)
MCHC RBC AUTO-ENTMCNC: 32.8 G/DL (ref 31.5–35.7)
MCV RBC AUTO: 82.3 FL (ref 79–97)
MONOCYTES # BLD AUTO: 0.49 10*3/MM3 (ref 0.1–0.9)
MONOCYTES NFR BLD AUTO: 5.1 % (ref 5–12)
NEUTROPHILS NFR BLD AUTO: 7.75 10*3/MM3 (ref 1.7–7)
NEUTROPHILS NFR BLD AUTO: 81 % (ref 42.7–76)
NRBC BLD AUTO-RTO: 0 /100 WBC (ref 0–0.2)
PLATELET # BLD AUTO: 237 10*3/MM3 (ref 140–450)
PMV BLD AUTO: 9.6 FL (ref 6–12)
POTASSIUM SERPL-SCNC: 4.1 MMOL/L (ref 3.5–5.2)
PROT SERPL-MCNC: 6.8 G/DL (ref 6–8.5)
PROTHROMBIN TIME: 13.7 SECONDS (ref 11.7–14.2)
RBC # BLD AUTO: 5.41 10*6/MM3 (ref 4.14–5.8)
SODIUM SERPL-SCNC: 134 MMOL/L (ref 136–145)
TROPONIN T SERPL HS-MCNC: 33 NG/L
WBC NRBC COR # BLD: 9.58 10*3/MM3 (ref 3.4–10.8)

## 2023-06-01 PROCEDURE — 96374 THER/PROPH/DIAG INJ IV PUSH: CPT

## 2023-06-01 PROCEDURE — 99285 EMERGENCY DEPT VISIT HI MDM: CPT

## 2023-06-01 PROCEDURE — 85730 THROMBOPLASTIN TIME PARTIAL: CPT | Performed by: NURSE PRACTITIONER

## 2023-06-01 PROCEDURE — 93005 ELECTROCARDIOGRAM TRACING: CPT | Performed by: EMERGENCY MEDICINE

## 2023-06-01 PROCEDURE — 93010 ELECTROCARDIOGRAM REPORT: CPT | Performed by: INTERNAL MEDICINE

## 2023-06-01 PROCEDURE — 85025 COMPLETE CBC W/AUTO DIFF WBC: CPT | Performed by: NURSE PRACTITIONER

## 2023-06-01 PROCEDURE — 84484 ASSAY OF TROPONIN QUANT: CPT | Performed by: NURSE PRACTITIONER

## 2023-06-01 PROCEDURE — G0378 HOSPITAL OBSERVATION PER HR: HCPCS

## 2023-06-01 PROCEDURE — 80053 COMPREHEN METABOLIC PANEL: CPT | Performed by: NURSE PRACTITIONER

## 2023-06-01 PROCEDURE — 70450 CT HEAD/BRAIN W/O DYE: CPT

## 2023-06-01 PROCEDURE — 71045 X-RAY EXAM CHEST 1 VIEW: CPT

## 2023-06-01 PROCEDURE — 85610 PROTHROMBIN TIME: CPT | Performed by: NURSE PRACTITIONER

## 2023-06-01 RX ORDER — SODIUM CHLORIDE 0.9 % (FLUSH) 0.9 %
10 SYRINGE (ML) INJECTION AS NEEDED
Status: DISCONTINUED | OUTPATIENT
Start: 2023-06-01 | End: 2023-06-02 | Stop reason: HOSPADM

## 2023-06-02 ENCOUNTER — APPOINTMENT (OUTPATIENT)
Dept: MRI IMAGING | Facility: HOSPITAL | Age: 53
End: 2023-06-02
Payer: MEDICARE

## 2023-06-02 ENCOUNTER — APPOINTMENT (OUTPATIENT)
Dept: CARDIOLOGY | Facility: HOSPITAL | Age: 53
End: 2023-06-02
Payer: MEDICARE

## 2023-06-02 ENCOUNTER — READMISSION MANAGEMENT (OUTPATIENT)
Dept: CALL CENTER | Facility: HOSPITAL | Age: 53
End: 2023-06-02

## 2023-06-02 VITALS
HEIGHT: 69 IN | HEART RATE: 81 BPM | SYSTOLIC BLOOD PRESSURE: 125 MMHG | BODY MASS INDEX: 36.88 KG/M2 | DIASTOLIC BLOOD PRESSURE: 73 MMHG | OXYGEN SATURATION: 98 % | TEMPERATURE: 97.7 F | WEIGHT: 249 LBS | RESPIRATION RATE: 16 BRPM

## 2023-06-02 PROBLEM — R42 DIZZINESS: Status: ACTIVE | Noted: 2023-06-02

## 2023-06-02 LAB
ANION GAP SERPL CALCULATED.3IONS-SCNC: 9 MMOL/L (ref 5–15)
AORTIC DIMENSIONLESS INDEX: 0.6 (DI)
BH CV ECHO MEAS - ACS: 2.7 CM
BH CV ECHO MEAS - AO MAX PG: 11.1 MMHG
BH CV ECHO MEAS - AO MEAN PG: 7.5 MMHG
BH CV ECHO MEAS - AO ROOT DIAM: 3 CM
BH CV ECHO MEAS - AO V2 MAX: 167 CM/SEC
BH CV ECHO MEAS - AO V2 VTI: 34.8 CM
BH CV ECHO MEAS - AVA(I,D): 2.02 CM2
BH CV ECHO MEAS - EDV(CUBED): 23.1 ML
BH CV ECHO MEAS - EDV(MOD-SP2): 54 ML
BH CV ECHO MEAS - EDV(MOD-SP4): 71 ML
BH CV ECHO MEAS - EF(MOD-BP): 62.6 %
BH CV ECHO MEAS - EF(MOD-SP2): 63 %
BH CV ECHO MEAS - EF(MOD-SP4): 62 %
BH CV ECHO MEAS - ESV(CUBED): 34.5 ML
BH CV ECHO MEAS - ESV(MOD-SP2): 20 ML
BH CV ECHO MEAS - ESV(MOD-SP4): 27 ML
BH CV ECHO MEAS - FS: -14.2 %
BH CV ECHO MEAS - IVS/LVPW: 1.06 CM
BH CV ECHO MEAS - IVSD: 1 CM
BH CV ECHO MEAS - LA DIMENSION: 3.9 CM
BH CV ECHO MEAS - LAT PEAK E' VEL: 7 CM/SEC
BH CV ECHO MEAS - LV DIASTOLIC VOL/BSA (35-75): 31.3 CM2
BH CV ECHO MEAS - LV MASS(C)D: 73.1 GRAMS
BH CV ECHO MEAS - LV MAX PG: 3.9 MMHG
BH CV ECHO MEAS - LV MEAN PG: 2.36 MMHG
BH CV ECHO MEAS - LV SYSTOLIC VOL/BSA (12-30): 11.9 CM2
BH CV ECHO MEAS - LV V1 MAX: 98.5 CM/SEC
BH CV ECHO MEAS - LV V1 VTI: 22 CM
BH CV ECHO MEAS - LVIDD: 2.8 CM
BH CV ECHO MEAS - LVIDS: 3.3 CM
BH CV ECHO MEAS - LVOT AREA: 3.2 CM2
BH CV ECHO MEAS - LVOT DIAM: 2.02 CM
BH CV ECHO MEAS - LVPWD: 0.95 CM
BH CV ECHO MEAS - MED PEAK E' VEL: 9.2 CM/SEC
BH CV ECHO MEAS - MV A DUR: 0.17 SEC
BH CV ECHO MEAS - MV A MAX VEL: 88.7 CM/SEC
BH CV ECHO MEAS - MV DEC SLOPE: 438.8 CM/SEC2
BH CV ECHO MEAS - MV DEC TIME: 247 MSEC
BH CV ECHO MEAS - MV E MAX VEL: 87.8 CM/SEC
BH CV ECHO MEAS - MV E/A: 0.99
BH CV ECHO MEAS - MV MAX PG: 5.2 MMHG
BH CV ECHO MEAS - MV MEAN PG: 2.7 MMHG
BH CV ECHO MEAS - MV P1/2T: 80.6 MSEC
BH CV ECHO MEAS - MV V2 VTI: 31.6 CM
BH CV ECHO MEAS - MVA(P1/2T): 2.7 CM2
BH CV ECHO MEAS - MVA(VTI): 2.23 CM2
BH CV ECHO MEAS - PA ACC TIME: 0.13 SEC
BH CV ECHO MEAS - PA PR(ACCEL): 19.6 MMHG
BH CV ECHO MEAS - PA V2 MAX: 146.6 CM/SEC
BH CV ECHO MEAS - QP/QS: 0.94
BH CV ECHO MEAS - RAP SYSTOLE: 3 MMHG
BH CV ECHO MEAS - RV MAX PG: 4.7 MMHG
BH CV ECHO MEAS - RV V1 MAX: 108 CM/SEC
BH CV ECHO MEAS - RV V1 VTI: 21.8 CM
BH CV ECHO MEAS - RVOT DIAM: 1.97 CM
BH CV ECHO MEAS - RVSP: 22 MMHG
BH CV ECHO MEAS - SI(MOD-SP2): 15 ML/M2
BH CV ECHO MEAS - SI(MOD-SP4): 19.4 ML/M2
BH CV ECHO MEAS - SV(LVOT): 70.3 ML
BH CV ECHO MEAS - SV(MOD-SP2): 34 ML
BH CV ECHO MEAS - SV(MOD-SP4): 44 ML
BH CV ECHO MEAS - SV(RVOT): 66.3 ML
BH CV ECHO MEAS - TAPSE (>1.6): 3 CM
BH CV ECHO MEAS - TR MAX PG: 18.9 MMHG
BH CV ECHO MEAS - TR MAX VEL: 217.2 CM/SEC
BH CV ECHO MEASUREMENTS AVERAGE E/E' RATIO: 10.84
BH CV ECHO SHUNT ASSESSMENT PERFORMED (HIDDEN SCRIPTING): 1
BH CV XLRA - RV BASE: 3.3 CM
BH CV XLRA - RV LENGTH: 8.5 CM
BH CV XLRA - RV MID: 2.47 CM
BH CV XLRA - TDI S': 13.7 CM/SEC
BUN SERPL-MCNC: 10 MG/DL (ref 6–20)
BUN/CREAT SERPL: 11.9 (ref 7–25)
CALCIUM SPEC-SCNC: 8.6 MG/DL (ref 8.6–10.5)
CHLORIDE SERPL-SCNC: 102 MMOL/L (ref 98–107)
CO2 SERPL-SCNC: 26 MMOL/L (ref 22–29)
CREAT SERPL-MCNC: 0.84 MG/DL (ref 0.76–1.27)
EGFRCR SERPLBLD CKD-EPI 2021: 106.2 ML/MIN/1.73
GEN 5 2HR TROPONIN T REFLEX: 41 NG/L
GLUCOSE SERPL-MCNC: 101 MG/DL (ref 65–99)
LEFT ATRIUM VOLUME INDEX: 24.7 ML/M2
MAXIMAL PREDICTED HEART RATE: 170 BPM
POTASSIUM SERPL-SCNC: 4.1 MMOL/L (ref 3.5–5.2)
QT INTERVAL: 441 MS
QT INTERVAL: 468 MS
SINUS: 2.6 CM
SODIUM SERPL-SCNC: 137 MMOL/L (ref 136–145)
STRESS TARGET HR: 145 BPM
TROPONIN T DELTA: 8 NG/L
TROPONIN T SERPL HS-MCNC: 40 NG/L

## 2023-06-02 PROCEDURE — G0378 HOSPITAL OBSERVATION PER HR: HCPCS

## 2023-06-02 PROCEDURE — 93306 TTE W/DOPPLER COMPLETE: CPT

## 2023-06-02 PROCEDURE — 84484 ASSAY OF TROPONIN QUANT: CPT | Performed by: NURSE PRACTITIONER

## 2023-06-02 PROCEDURE — 36415 COLL VENOUS BLD VENIPUNCTURE: CPT

## 2023-06-02 PROCEDURE — 80048 BASIC METABOLIC PNL TOTAL CA: CPT | Performed by: NURSE PRACTITIONER

## 2023-06-02 PROCEDURE — 25010000002 LORAZEPAM PER 2 MG: Performed by: EMERGENCY MEDICINE

## 2023-06-02 PROCEDURE — 97162 PT EVAL MOD COMPLEX 30 MIN: CPT

## 2023-06-02 PROCEDURE — 93306 TTE W/DOPPLER COMPLETE: CPT | Performed by: INTERNAL MEDICINE

## 2023-06-02 PROCEDURE — 99285 EMERGENCY DEPT VISIT HI MDM: CPT

## 2023-06-02 PROCEDURE — 99214 OFFICE O/P EST MOD 30 MIN: CPT | Performed by: INTERNAL MEDICINE

## 2023-06-02 PROCEDURE — 70551 MRI BRAIN STEM W/O DYE: CPT

## 2023-06-02 PROCEDURE — 93010 ELECTROCARDIOGRAM REPORT: CPT | Performed by: INTERNAL MEDICINE

## 2023-06-02 PROCEDURE — 99214 OFFICE O/P EST MOD 30 MIN: CPT | Performed by: PSYCHIATRY & NEUROLOGY

## 2023-06-02 PROCEDURE — 93005 ELECTROCARDIOGRAM TRACING: CPT | Performed by: NURSE PRACTITIONER

## 2023-06-02 RX ORDER — MECLIZINE HYDROCHLORIDE 25 MG/1
25 TABLET ORAL 3 TIMES DAILY PRN
Status: DISCONTINUED | OUTPATIENT
Start: 2023-06-02 | End: 2023-06-02 | Stop reason: HOSPADM

## 2023-06-02 RX ORDER — BISACODYL 5 MG/1
5 TABLET, DELAYED RELEASE ORAL DAILY PRN
Status: DISCONTINUED | OUTPATIENT
Start: 2023-06-02 | End: 2023-06-02 | Stop reason: HOSPADM

## 2023-06-02 RX ORDER — SPIRONOLACTONE 25 MG/1
25 TABLET ORAL DAILY
Status: DISCONTINUED | OUTPATIENT
Start: 2023-06-02 | End: 2023-06-02 | Stop reason: HOSPADM

## 2023-06-02 RX ORDER — SODIUM CHLORIDE 0.9 % (FLUSH) 0.9 %
10 SYRINGE (ML) INJECTION AS NEEDED
Status: DISCONTINUED | OUTPATIENT
Start: 2023-06-02 | End: 2023-06-02 | Stop reason: HOSPADM

## 2023-06-02 RX ORDER — ACETAMINOPHEN 325 MG/1
650 TABLET ORAL EVERY 4 HOURS PRN
Status: DISCONTINUED | OUTPATIENT
Start: 2023-06-02 | End: 2023-06-02 | Stop reason: HOSPADM

## 2023-06-02 RX ORDER — POLYETHYLENE GLYCOL 3350 17 G/17G
17 POWDER, FOR SOLUTION ORAL DAILY PRN
Status: DISCONTINUED | OUTPATIENT
Start: 2023-06-02 | End: 2023-06-02 | Stop reason: HOSPADM

## 2023-06-02 RX ORDER — MELATONIN
5000 DAILY
Status: DISCONTINUED | OUTPATIENT
Start: 2023-06-02 | End: 2023-06-02 | Stop reason: HOSPADM

## 2023-06-02 RX ORDER — SODIUM CHLORIDE 0.9 % (FLUSH) 0.9 %
10 SYRINGE (ML) INJECTION EVERY 12 HOURS SCHEDULED
Status: DISCONTINUED | OUTPATIENT
Start: 2023-06-02 | End: 2023-06-02 | Stop reason: HOSPADM

## 2023-06-02 RX ORDER — BISACODYL 10 MG
10 SUPPOSITORY, RECTAL RECTAL DAILY PRN
Status: DISCONTINUED | OUTPATIENT
Start: 2023-06-02 | End: 2023-06-02 | Stop reason: HOSPADM

## 2023-06-02 RX ORDER — AMOXICILLIN 250 MG
2 CAPSULE ORAL 2 TIMES DAILY
Status: DISCONTINUED | OUTPATIENT
Start: 2023-06-02 | End: 2023-06-02 | Stop reason: HOSPADM

## 2023-06-02 RX ORDER — PANTOPRAZOLE SODIUM 40 MG/1
40 TABLET, DELAYED RELEASE ORAL
Status: DISCONTINUED | OUTPATIENT
Start: 2023-06-02 | End: 2023-06-02 | Stop reason: HOSPADM

## 2023-06-02 RX ORDER — LISINOPRIL 20 MG/1
20 TABLET ORAL DAILY
Status: DISCONTINUED | OUTPATIENT
Start: 2023-06-02 | End: 2023-06-02 | Stop reason: HOSPADM

## 2023-06-02 RX ORDER — LORAZEPAM 2 MG/ML
1 INJECTION INTRAMUSCULAR ONCE
Status: COMPLETED | OUTPATIENT
Start: 2023-06-02 | End: 2023-06-02

## 2023-06-02 RX ORDER — AMLODIPINE BESYLATE 10 MG/1
10 TABLET ORAL DAILY
Status: DISCONTINUED | OUTPATIENT
Start: 2023-06-02 | End: 2023-06-02 | Stop reason: HOSPADM

## 2023-06-02 RX ORDER — OXCARBAZEPINE 300 MG/1
600 TABLET, FILM COATED ORAL 2 TIMES DAILY
Status: DISCONTINUED | OUTPATIENT
Start: 2023-06-02 | End: 2023-06-02 | Stop reason: HOSPADM

## 2023-06-02 RX ORDER — PRAVASTATIN SODIUM 40 MG
40 TABLET ORAL DAILY
Status: DISCONTINUED | OUTPATIENT
Start: 2023-06-02 | End: 2023-06-02 | Stop reason: HOSPADM

## 2023-06-02 RX ORDER — SODIUM CHLORIDE 9 MG/ML
40 INJECTION, SOLUTION INTRAVENOUS AS NEEDED
Status: DISCONTINUED | OUTPATIENT
Start: 2023-06-02 | End: 2023-06-02 | Stop reason: HOSPADM

## 2023-06-02 RX ADMIN — Medication 10 ML: at 08:43

## 2023-06-02 RX ADMIN — Medication 10 ML: at 04:00

## 2023-06-02 RX ADMIN — LORAZEPAM 1 MG: 2 INJECTION INTRAMUSCULAR; INTRAVENOUS at 03:59

## 2023-06-02 RX ADMIN — Medication 5000 UNITS: at 08:42

## 2023-06-02 RX ADMIN — PANTOPRAZOLE SODIUM 40 MG: 40 TABLET, DELAYED RELEASE ORAL at 05:23

## 2023-06-02 RX ADMIN — OXCARBAZEPINE 600 MG: 300 TABLET, FILM COATED ORAL at 08:43

## 2023-06-02 RX ADMIN — PRAVASTATIN SODIUM 40 MG: 40 TABLET ORAL at 08:42

## 2023-06-02 RX ADMIN — SPIRONOLACTONE 25 MG: 25 TABLET ORAL at 08:42

## 2023-06-02 RX ADMIN — LISINOPRIL 20 MG: 20 TABLET ORAL at 08:42

## 2023-06-02 NOTE — CASE MANAGEMENT/SOCIAL WORK
Discharge Planning Assessment  [facility]     Patient Name: Gaurav Mora  MRN: [id number]  Today's Date: 6/2/2023    Admit Date: 6/1/2023    Plan: Plans to return home at meet/Levi Silver RN   Discharge Needs Assessment     Row Name 06/02/23 1043       Discharge Needs Assessment    Equipment Currently Used at Home cpap    Row Name 06/02/23 1040       Living Environment    People in Home parent(s)    Name(s) of People in Home father Dayo and stepmoadria Piper    Current Living Arrangements home    Potentially Unsafe Housing Conditions none    Primary Care Provided by self    Provides Primary Care For no one    Family Caregiver if Needed parent(s)    Family Caregiver Names father Dayo and naty Saldaña    Quality of Family Relationships supportive    Able to Return to Prior Arrangements yes       Resource/Environmental Concerns    Resource/Environmental Concerns none       Transition Planning    Patient/Family Anticipates Transition to home with family    Patient/Family Anticipated Services at Transition none    Transportation Anticipated family or friend will provide       Discharge Needs Assessment    Equipment Currently Used at Home bp cuff;grab bar;bath bench    Concerns to be Addressed no discharge needs identified    Anticipated Changes Related to Illness none    Equipment Needed After Discharge none    Provided Post Acute Provider List? N/A    Provided Post Acute Provider Quality & Resource List? N/A               Discharge Plan     Row Name 06/02/23 1043       Plan    Plan Plans to return home at meet/Levi Silver RN    Patient/Family in Agreement with Plan yes    Provided Post Acute Provider List? N/A    Plan Comments Spoke w/ patient at bedside w/ his permission. Introduced self and explained role. All info on facesheet, including PCP as BRODERICK Nixon, verified. Lives in two story home w/ father Dayo and stepmother Piper, and is able to navigate steps and around home w/o difficulty. Independent w/ ADLs. Uses  B/P cuff, bath bench, grab bars and CPAP at home. Denies need for any DME or community resources at d/c. Uses [facility] in [facility] and [facility] and is able to obtain and pay for meds. To return home at d/c w/ family's assist as needed; family will transport; agreeable w/ plan. CM will continue to follow-DHIRAJ Silver RN              Continued Care and Services - Admitted Since 6/1/2023    Coordination has not been started for this encounter.          Demographic Summary     Row Name 06/02/23 1039       General Information    Admission Type observation    Arrived From emergency department    Required Notices Provided Observation Status Notice    Referral Source admission list    Reason for Consult discharge planning    Preferred Language English       Contact Information    Permission Granted to Share Info With                Functional Status     Row Name 06/02/23 1039       Functional Status    Usual Activity Tolerance fair    Current Activity Tolerance fair       Functional Status, IADL    Medications independent    IADL Comments parents do most meal prep, housekeeping, laundry and shopping       Mental Status    General Appearance WDL WDL       Mental Status Summary    Recent Changes in Mental Status/Cognitive Functioning no changes               Psychosocial     Row Name 06/02/23 1040       Behavior WDL    Behavior WDL WDL       Emotion Mood WDL    Emotion/Mood/Affect WDL WDL       Speech WDL    Speech WDL WDL       Perceptual State WDL    Perceptual State WDL WDL       Thought Process WDL    Thought Process WDL WDL       Intellectual Performance WDL    Intellectual Performance WDL WDL               Abuse/Neglect    No documentation.                Legal    No documentation.                Substance Abuse    No documentation.                Patient Forms    No documentation.                   Onelia Silver RN

## 2023-06-02 NOTE — PLAN OF CARE
Goal Outcome Evaluation:      Pt d/c via private vehicle. IV removed. Belongings returned. Follow up instructions given. No further questions/complaints at this time.

## 2023-06-02 NOTE — CONSULTS
NEUROLOGY CONSULT     DOS: 2023  NAME: Gaurav Mora   : 1970  PCP: Rebecca Nixon PA  CC: Stroke  Referring MD: Tobias Anna MD      Neurological Problem and Interval History:  52 y.o. 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  Patient had sudden onset of dizziness with gait imbalance.  He noted that he was slightly more hypertensive than typical though he admits he has not been tracking his blood pressures and unsure what they really have been running lately.  Patient noted the symptoms with turning or moving his head when he went to bend forward or sit upright.  This sounds to be more like benign paroxysmal positional vertigo than a dozen orthostatic hypotension.  He had no other neurologic signs or symptoms with it.  Neuroimaging is unremarkable.  Cardiology is seeing for minor increase in troponin.      Past Medical/Surgical Hx:  Past Medical History:   Diagnosis Date   • Elevated CK    • Environmental allergies    • Headache, tension-type    • Hemorrhagic cerebrovascular accident (CVA)    • Hemorrhagic stroke 2017    Residual peripheral vision loss, right-sided sensation loss   • History of bladder infections 2017   • Hyperlipidemia    • Hypertension    • Lung nodule    • New onset seizure 2022   • SHANE on CPAP     WEARS CPAP   • PONV (postoperative nausea and vomiting)    • Seizure 2022   • Stroke 2017     Past Surgical History:   Procedure Laterality Date   • APPENDECTOMY N/A    • COLONOSCOPY N/A 2022    Procedure: COLONOSCOPY into  cecum with cold bx polypectomy;  Surgeon: Jeronimo Paul Jr., MD;  Location: [facility];  Service: General;  Laterality: N/A;  pre:  screen  post: polyp    • MUSCLE BIOPSY Left 08/19/2020    Procedure: quadriceps muscle biopsy;  Surgeon: Paulina Nina MD;  Location: [facility] OR;  Service: General;  Laterality: Left;         Medications On Admission  Medications Prior to Admission   Medication Sig Dispense Refill Last Dose   • acetaminophen (TYLENOL) 325 MG tablet Take 2 tablets by mouth Every 4 (Four) Hours As Needed for Mild Pain.      • amLODIPine (NORVASC) 10 MG tablet TAKE 1 TABLET DAILY 90 tablet 1 6/1/2023 at 2000   • aspirin 81 MG EC tablet Take 1 tablet by mouth Daily.   6/1/2023 at 1500   • cholecalciferol (VITAMIN D3) 25 MCG (1000 UT) tablet Take 1 tablet by mouth Daily.   6/1/2023 at 0800   • ipratropium (ATROVENT) 0.06 % nasal spray    6/1/2023 at 0800   • labetalol (NORMODYNE) 100 MG tablet TAKE 1 TABLET THREE TIMES A  tablet 5 6/1/2023 at 1500   • levocetirizine (XYZAL) 5 MG tablet Take 1 tablet by mouth Every Evening.   6/1/2023 at 2000   • lisinopril (PRINIVIL,ZESTRIL) 20 MG tablet TAKE 1 TABLET DAILY 90 tablet 1 6/1/2023 at 0800   • omeprazole (priLOSEC) 20 MG capsule Take 1 capsule by mouth Daily.   6/1/2023 at 0800   • OXcarbazepine (TRILEPTAL) 600 MG tablet TAKE 1 TABLET TWICE A  tablet 3 6/1/2023 at 0800   • pravastatin (PRAVACHOL) 40 MG tablet 1 tablet Daily. 90 tablet 3 6/1/2023 at 2000   • spironolactone (ALDACTONE) 25 MG tablet TAKE 1 TABLET DAILY 90 tablet 1 6/1/2023 at 0800   • vitamin D3 125 MCG (5000 UT) capsule capsule Take 1 capsule by mouth Daily.   5/31/2023 at 0800       Allergies:  No Known Allergies    Social Hx:  Social History     Socioeconomic History   • Marital status: Single   • Number of children: 0   Tobacco Use   • Smoking status: Never     Passive exposure: Never   • Smokeless tobacco: Never   • Tobacco comments:     minimal caffeine   Vaping Use   • Vaping Use: Never used   Substance and Sexual Activity   • Alcohol use: Not Currently     Alcohol/week: 1.0 standard drink     Types:  "1 Cans of beer per week     Comment: This is rare.   • Drug use: Never   • Sexual activity: Never       Family Hx:  Family History   Problem Relation Age of Onset   • Breast cancer Mother    • Atrial fibrillation Father    • Hypertension Father    • Skin cancer Father    • Colon cancer Paternal Grandmother    • Heart disease Maternal Grandfather    • Parkinsonism Maternal Grandfather    • Heart disease Maternal Aunt    • Heart disease Maternal Uncle    • Malig Hyperthermia Neg Hx        Review of Imaging (Interpretation of images not reports):  MRI unremarkable  Head CT unremarkable      Laboratory Results:   Lab Results   Component Value Date    GLUCOSE 101 (H) 06/02/2023    CALCIUM 8.6 06/02/2023     06/02/2023    K 4.1 06/02/2023    CO2 26.0 06/02/2023     06/02/2023    BUN 10 06/02/2023    CREATININE 0.84 06/02/2023    EGFRIFAFRI 102 12/21/2021    EGFRIFNONA 88 12/21/2021    BCR 11.9 06/02/2023    ANIONGAP 9.0 06/02/2023     Lab Results   Component Value Date    WBC 9.58 06/01/2023    HGB 14.6 06/01/2023    HCT 44.5 06/01/2023    MCV 82.3 06/01/2023     06/01/2023     Lab Results   Component Value Date    CHOL 176 09/13/2022    CHOL 169 07/22/2022     Lab Results   Component Value Date    HDL 45 12/12/2022    HDL 43 09/13/2022    HDL 40 07/22/2022     Lab Results   Component Value Date     (H) 12/12/2022     (H) 09/13/2022     (H) 07/22/2022     Lab Results   Component Value Date    TRIG 51 12/12/2022    TRIG 74 09/13/2022    TRIG 49 07/22/2022     No results found for: HGBA1C  Lab Results   Component Value Date    INR 1.04 06/01/2023    PROTIME 13.7 06/01/2023         Physical Examination:   /79   Pulse 81   Temp 97.7 °F (36.5 °C) (Oral)   Resp 16   Ht 175.3 cm (69\")   Wt 113 kg (249 lb)   SpO2 98%   BMI 36.77 kg/m²   NIHSS:      0-->Alert: keenly responsive  0-->Answers both questions correctly  0-->Performs both tasks correctly  0=normal  1=Partial " hemianopia  1=Minor paralysis (flattened nasolabial fold, asymmetric on smiling)  0-->No drift: limb holds 90 (or 45) degrees for full 10 secs  1-->Drift: limb holds 90 (or 45) degrees, but drifts down before full 10 seconds: does not hit bed or other support  0-->No drift: limb holds 90 (or 45) degrees for full 10 secs  1-->Drift: limb holds 90 (or 45) degrees, but drifts down before full 10 seconds: does not hit bed or other support  0=Absent  1=Mild to moderate sensory loss; patient feels pinprick is less sharp or is dull on the affected side; there is a loss of superficial pain with pinprick but patient is aware He is being touched  1-->Mild-to-moderate aphasia: some obvious loss of fluency or facility of comprehension, without significant limitation on ideas expressed or form of expression. Reduction of speech and/or comprehension, however, makes conversation.  1=Mild to moderate, patient slurs at least some words and at worst, can be understood with some difficulty  0=No abnormality    Total score:7 (all baseline deficits)    Diagnoses / Discussion:  52 y.o. 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  This sounds to be more like benign paroxysmal positional vertigo than a dozen orthostatic hypotension.  He had no other neurologic signs or symptoms with it.  Neuroimaging is unremarkable.  Cardiology is seeing for minor increase in troponin.    Plan:  Head CT unremarkable  MRI unremarkable  TTE unremarkable  Orthostatic vital signs  Physical therapy for Epley maneuver     I have discussed the above with the patient and family.  Time spent with patient: 60min    [facility]  Reviewed: previous chart, nursing note and vitals  Reviewed  previous: labs, CT scan and MRI  Interpretation: labs, CT scan and MRI  Total time providing critical care: 30-74 minutes. This excludes time spent performing separately reportable procedures and services.  Consults: neurology         Nithya Mesa MD  Neurology

## 2023-06-02 NOTE — ED NOTES
"Nursing report ED to floor  Gaurav Mora  52 y.o.  male    HPI :   Chief Complaint   Patient presents with    Dizziness    Hypertension       Admitting doctor:   Tobias Anna MD    Admitting diagnosis:   The primary encounter diagnosis was Balance problem. Diagnoses of Dizziness and History of stroke were also pertinent to this visit.    Code status:   Current Code Status       Date Active Code Status Order ID Comments User Context       6/2/2023 0008 CPR (Attempt to Resuscitate) [id number]  Brendan Gonzalez APRN ED        Question Answer    Code Status (Patient has no pulse and is not breathing) CPR (Attempt to Resuscitate)    Medical Interventions (Patient has pulse or is breathing) Full Support    Level Of Support Discussed With Patient                    Allergies:   Patient has no known allergies.    Isolation:   No active isolations    Intake and Output  No intake or output data in the 24 hours ending 06/02/23 0020    Weight:       06/01/23 2117   Weight: 113 kg (250 lb)       Most recent vitals:   Vitals:    06/01/23 2023 06/01/23 2117 06/01/23 2216 06/01/23 2301   BP: 156/88  132/88 149/91   Pulse: 83  73 73   Resp: 18      Temp: 97.9 °F (36.6 °C)      SpO2: 96%  96%    Weight:  113 kg (250 lb)     Height:  175.3 cm (69\")         Active LDAs/IV Access:   Lines, Drains & Airways       Active LDAs       Name Placement date Placement time Site Days    Peripheral IV 06/01/23 2123 Left Antecubital 06/01/23 2123  Antecubital  less than 1                    Labs (abnormal labs have a star):   Labs Reviewed   COMPREHENSIVE METABOLIC PANEL - Abnormal; Notable for the following components:       Result Value    Glucose 197 (*)     Sodium 134 (*)     All other components within normal limits    Narrative:     GFR Normal >60  Chronic Kidney Disease <60  Kidney Failure <15     TROPONIN - Abnormal; Notable for the following components:    HS Troponin T 33 (*)     All other components within normal limits    " Narrative:     High Sensitive Troponin T Reference Range:  <10.0 ng/L- Negative Female for AMI  <15.0 ng/L- Negative Male for AMI  >=10 - Abnormal Female indicating possible myocardial injury.  >=15 - Abnormal Male indicating possible myocardial injury.   Clinicians would have to utilize clinical acumen, EKG, Troponin, and serial changes to determine if it is an Acute Myocardial Infarction or myocardial injury due to an underlying chronic condition.        CBC WITH AUTO DIFFERENTIAL - Abnormal; Notable for the following components:    Neutrophil % 81.0 (*)     Lymphocyte % 12.3 (*)     Neutrophils, Absolute 7.75 (*)     All other components within normal limits   PROTIME-INR - Normal   APTT - Normal   HIGH SENSITIVITIY TROPONIN T 2HR   TROPONIN   BASIC METABOLIC PANEL   CBC AND DIFFERENTIAL    Narrative:     The following orders were created for panel order CBC & Differential.  Procedure                               Abnormality         Status                     ---------                               -----------         ------                     CBC Auto Differential[[id number]]        Abnormal            Final result                 Please view results for these tests on the individual orders.       EKG:   ECG 12 Lead Syncope   Preliminary Result   HEART RATE= 73  bpm   RR Interval= 822  ms   SD Interval= 217  ms   P Horizontal Axis= 26  deg   P Front Axis= 15  deg   QRSD Interval= 153  ms   QT Interval= 441  ms   QRS Axis= -6  deg   T Wave Axis= 86  deg   - ABNORMAL ECG -   Sinus rhythm   Prolonged SD interval   Left bundle branch block   Electronically Signed By:    Date and Time of Study: 2023-06-01 22:31:42      SCANNED - TELEMETRY     Final Result          Meds given in ED:   Medications   sodium chloride 0.9 % flush 10 mL (has no administration in time range)   sodium chloride 0.9 % flush 10 mL (has no administration in time range)   sodium chloride 0.9 % flush 10 mL (has no administration in time range)    sodium chloride 0.9 % infusion 40 mL (has no administration in time range)   sennosides-docusate (PERICOLACE) 8.6-50 MG per tablet 2 tablet (has no administration in time range)     And   polyethylene glycol (MIRALAX) packet 17 g (has no administration in time range)     And   bisacodyl (DULCOLAX) EC tablet 5 mg (has no administration in time range)     And   bisacodyl (DULCOLAX) suppository 10 mg (has no administration in time range)   LORazepam (ATIVAN) injection 1 mg (has no administration in time range)       Imaging results:  XR Chest 1 View    Result Date: 6/1/2023  No acute findings.  This report was finalized on 6/1/2023 11:12 PM by Dr. Toya Weaver M.D.       Ambulatory status:   - adlib    Social issues:   Social History     Socioeconomic History    Marital status: Single    Number of children: 0   Tobacco Use    Smoking status: Never     Passive exposure: Never    Smokeless tobacco: Never    Tobacco comments:     minimal caffeine   Vaping Use    Vaping Use: Never used   Substance and Sexual Activity    Alcohol use: Not Currently     Alcohol/week: 1.0 standard drink     Types: 1 Cans of beer per week     Comment: This is rare.    Drug use: Never    Sexual activity: Never       NIH Stroke Scale:         Reema Salgado RN  06/02/23 00:20 EDT

## 2023-06-02 NOTE — THERAPY EVALUATION
Patient Name: Gaurav Mora  : 1970    MRN: [id number]                              Today's Date: 2023       Admit Date: 2023    Visit Dx:     ICD-10-CM ICD-9-CM   1. Balance problem  R26.89 781.99   2. Dizziness  R42 780.4   3. History of stroke  Z86.73 V12.54     Patient Active Problem List   Diagnosis   • Essential hypertension   • Alteration of sensations, post-stroke   • Expressive aphasia   • Obstructive sleep apnea   • Hyperlipidemia LDL goal <70   • Slow transit constipation   • Hemorrhagic cerebrovascular accident (CVA)   • Elevated CK   • Acute encephalopathy   • ICH (intracerebral hemorrhage)   • IVH (intraventricular hemorrhage)   • Acute kidney injury   • Aspiration pneumonia   • E. coli UTI (urinary tract infection)   • Morbid obesity with BMI of 45.0-49.9, adult   • Oropharyngeal dysphagia   • Sleep-related breathing disorder   • Urinary retention   • Aphasia   • Right homonymous hemianopsia   • Colon cancer screening   • New onset seizure   • Dizziness     Past Medical History:   Diagnosis Date   • Elevated CK    • Environmental allergies    • Headache, tension-type    • Hemorrhagic cerebrovascular accident (CVA)    • Hemorrhagic stroke 2017    Residual peripheral vision loss, right-sided sensation loss   • History of bladder infections 2017   • Hyperlipidemia    • Hypertension    • Lung nodule    • New onset seizure 2022   • SHANE on CPAP     WEARS CPAP   • PONV (postoperative nausea and vomiting)    • Seizure 2022   • Stroke 2017     Past Surgical History:   Procedure Laterality Date   • APPENDECTOMY N/A    • COLONOSCOPY N/A 2022    Procedure: COLONOSCOPY into  cecum with cold bx polypectomy;  Surgeon: Jeronimo Paul Jr., MD;  Location: [facility] ENDOSCOPY;  Service: General;  Laterality: N/A;  pre: screen  post: polyp    • MUSCLE BIOPSY Left 2020    Procedure: quadriceps muscle biopsy;  Surgeon: Paulina Nina MD;  Location: [facility] MAIN OR;   Service: General;  Laterality: Left;      General Information     [facility] Name 06/02/23 1559          Physical Therapy Time and Intention    Document Type evaluation  -     Mode of Treatment individual therapy;physical therapy  -     Row Name 06/02/23 1559          General Information    Patient Profile Reviewed yes  -ST     Prior Level of Function independent:  -ST     Barriers to Rehab none identified  -[facility] Name 06/02/23 1559          Living Environment    People in Home parent(s)  -[facility] Name 06/02/23 1559          Cognition    Orientation Status (Cognition) oriented x 4  -[facility] Name 06/02/23 1559          Safety Issues, Functional Mobility    Comment, Safety Issues/Impairments (Mobility) shoes, gait belt donned  -           User Key  (r) = Recorded By, (t) = Taken By, (c) = Cosigned By    Initials Name Provider Type    Nilda Le PT Physical Therapist               Mobility     Row Name 06/02/23 1600          Bed Mobility    Bed Mobility bed mobility (all) activities  -ST     All Activities, [place] (Bed Mobility) independent  -     Row Name 06/02/23 1600          Sit-Stand Transfer    Sit-Stand [place] (Transfers) independent  -     Row Name 06/02/23 1600          Gait/Stairs (Locomotion)    [place] Level (Gait) modified independence  -ST     Distance in Feet (Gait) 60'  -ST     Deviations/Abnormal Patterns (Gait) right sided deviations;kenzie decreased;gait speed decreased;stride length decreased  -ST     Right Sided Gait Deviations foot drop/toe drag;heel strike decreased;hip hiking  -     Comment, (Gait/Stairs) no LOB and no overt unsteadiness noted  -           User Key  (r) = Recorded By, (t) = Taken By, (c) = Cosigned By    Initials Name Provider Type    Nilda Le PT Physical Therapist               Obj/Interventions     Row Name 06/02/23 1600          Range of Motion Comprehensive    Comment, General Range of Motion R side impaired  "from previous CVA  -ST     Row Name 06/02/23 1600          Strength Comprehensive (MMT)    Comment, General Manual Muscle Testing (MMT) Assessment R side impaired from previous CVA  -ST     Row Name 06/02/23 1600          Balance    Comment, Balance performed hallpike orlin - negative both sides with no symptoms.  -ST           User Key  (r) = Recorded By, (t) = Taken By, (c) = Cosigned By    Initials Name Provider Type    ST Nilda Bray, PT Physical Therapist               Goals/Plan    No documentation.                Clinical Impression     Row Name 06/02/23 1601          Pain    Pretreatment Pain Rating 0/10 - no pain  -ST     Posttreatment Pain Rating 0/10 - no pain  -ST     Row Name 06/02/23 1601          Plan of Care Review    Plan of Care Reviewed With patient;father  -ST     Row Name 06/02/23 1601          Therapy Assessment/Plan (PT)    Patient/Family Therapy Goals Statement (PT) Pt is 53 y/o M admitted to ED obs on 6/2/23 for increased dizziness. imaging ruled out any acute neurologic issues. Pt with hx of CVA resulting in R sided numbness and weakness. PT consulted for vestibular needs. Performed hallpike orlin and found to be negative bilat with no sxs reproduced. Did not perform Epley as not indicated. Pt is indep with bed mobility and transfers - mod I/SBA for ambulation with reports of feeling \"off\" but not dizzy. Pt safe to return home at this time, but did educate pt on effects of CVA on vestibular system and importance of OP PT for vestibular follow up. In regards to mobilty pt is at baseline, no acute PT indicated  -ST     Criteria for Skilled Interventions Met (PT) no;no problems identified which require skilled intervention  -ST     Therapy Frequency (PT) evaluation only  -ST           User Key  (r) = Recorded By, (t) = Taken By, (c) = Cosigned By    Initials Name Provider Type    ST Nilda Bray, PT Physical Therapist               Outcome Measures     Row Name 06/02/23 1605 " 06/02/23 0900       How much help from another person do you currently need...    Turning from your back to your side while in flat bed without using bedrails? 4  -ST 4  -RM    Moving from lying on back to sitting on the side of a flat bed without bedrails? 4  -ST 4  -RM    Moving to and from a bed to a chair (including a wheelchair)? 4  -ST 4  -RM    Standing up from a chair using your arms (e.g., wheelchair, bedside chair)? 4  -ST 4  -RM    Climbing 3-5 steps with a railing? 4  -ST 4  -RM    To walk in hospital room? 4  -ST 4  -RM    AM-PAC 6 Clicks Score (PT) 24  -ST 24  -RM    Highest level of mobility 8 --> Walked 250 feet or more  -ST 8 --> Walked 250 feet or more  -RM    Row Name 06/02/23 1605          Functional Assessment    Outcome Measure Options AM-PAC 6 Clicks Basic Mobility (PT)  -ST           User Key  (r) = Recorded By, (t) = Taken By, (c) = Cosigned By    Initials Name Provider Type    RM Yarelis Cisse, RN Registered Nurse    Nilda Le, PT Physical Therapist                               PT Recommendation and Plan     Plan of Care Reviewed With: patient, father     Time Calculation:    PT Charges     Row Name 06/02/23 1605             Time Calculation    Start Time 1528  -ST      Stop Time 1540  -ST      Time Calculation (min) 12 min  -ST      PT Received On 06/02/23  -ST         Time Calculation- PT    Total Timed Code Minutes- PT 0 minute(s)  -ST            User Key  (r) = Recorded By, (t) = Taken By, (c) = Cosigned By    Initials Name Provider Type    Nilda Le PT Physical Therapist              Therapy Charges for Today     Code Description Service Date Service Provider Modifiers Qty    [id number] HC PT EVAL MOD COMPLEXITY 1 6/2/2023 Nilda Bray PT GP 1          PT G-Codes  Outcome Measure Options: AM-PAC 6 Clicks Basic Mobility (PT)  AM-PAC 6 Clicks Score (PT): 24  PT Discharge Summary  Anticipated Discharge Disposition (PT): home, home with outpatient  therapy services (VESTIBULAR)    Nilda Bray, PT  6/2/2023

## 2023-06-02 NOTE — ED PROVIDER NOTES
EMERGENCY DEPARTMENT ENCOUNTER    Room Number:  03/03  Date seen:  6/2/2023  Time seen: 21:36 EDT  PCP: Rebecca Nixon PA  Historian: pt, father and step mom    HPI:  Chief complaint:dizziness, balance problem  A complete HPI/ROS/PMH/PSH/SH/FH are unobtainable due to: n/a  Context:Gaurav Mora is a 52 y.o. male with history of hypertension, expressive aphasia, obstructive sleep apnea, hemorrhagic stroke who presents to the ED with c/o acute onset of dizziness and balance problem that started today at 3:00 pm.  It is not made better/worse by anything.  He also had elevated BP reading.  Went to Urgent care and was sent here for further evaluation.  He arrives with his father and step mom.  They state he seems to be at neurological baseline.  He is not having any different than usual  Visual changes, speech changes or weakness.  Due to his h/o stroke in 2017 he does have some right sided sensory deficits, visual changes and speech issues (for instances when he says the work truck, it comes out as 'cruck'.  He is not anticoagulated.       Social determinants of health which may impact assessment: n/a    Review of prior external notes (non-ED): I reviewed recent office visit note with Dr. Coates, [facility].    Review of prior external test results outside of this encounter: Not applicable    ALLERGIES  Patient has no known allergies.    PAST MEDICAL HISTORY  Active Ambulatory Problems     Diagnosis Date Noted   • Essential hypertension 08/13/2018   • Alteration of sensations, post-stroke 08/13/2018   • Expressive aphasia 08/13/2018   • Obstructive sleep apnea 08/13/2018   • Hyperlipidemia LDL goal <70 01/07/2019   • Slow transit constipation 01/07/2019   • Hemorrhagic cerebrovascular accident (CVA) 11/24/2017   • Elevated CK 08/12/2020   • Acute encephalopathy 11/27/2017   • ICH (intracerebral hemorrhage) 11/24/2017   • IVH (intraventricular hemorrhage) 11/24/2017   • Acute kidney injury  11/27/2017   • Aspiration pneumonia 11/27/2017   • E. coli UTI (urinary tract infection) 12/20/2017   • Morbid obesity with BMI of 45.0-49.9, adult 12/06/2017   • Oropharyngeal dysphagia 12/06/2017   • Sleep-related breathing disorder 12/06/2017   • Urinary retention 12/06/2017   • Aphasia 06/12/2018   • Right homonymous hemianopsia 04/06/2018   • Colon cancer screening 01/18/2022   • New onset seizure 04/28/2022     Resolved Ambulatory Problems     Diagnosis Date Noted   • No Resolved Ambulatory Problems     Past Medical History:   Diagnosis Date   • Environmental allergies    • Headache, tension-type    • Hemorrhagic stroke 11/24/2017   • History of bladder infections 12/2017   • Hyperlipidemia    • Hypertension    • Lung nodule    • SHANE on CPAP    • PONV (postoperative nausea and vomiting)    • Seizure 04/28/2022   • Stroke 2017       PAST SURGICAL HISTORY  Past Surgical History:   Procedure Laterality Date   • APPENDECTOMY N/A 1987   • COLONOSCOPY N/A 02/17/2022    Procedure: COLONOSCOPY into  cecum with cold bx polypectomy;  Surgeon: Jeronimo Paul Jr., MD;  Location: [facility] ENDOSCOPY;  Service: General;  Laterality: N/A;  pre: screen  post: polyp    • MUSCLE BIOPSY Left 08/19/2020    Procedure: quadriceps muscle biopsy;  Surgeon: Paulina Nina MD;  Location: [facility] MAIN OR;  Service: General;  Laterality: Left;       FAMILY HISTORY  Family History   Problem Relation Age of Onset   • Breast cancer Mother    • Atrial fibrillation Father    • Hypertension Father    • Skin cancer Father    • Colon cancer Paternal Grandmother    • Heart disease Maternal Grandfather    • Parkinsonism Maternal Grandfather    • Heart disease Maternal Aunt    • Heart disease Maternal Uncle    • Malig Hyperthermia Neg Hx        SOCIAL HISTORY  Social History     Socioeconomic History   • Marital status: Single   • Number of children: 0   Tobacco Use   • Smoking status: Never     Passive exposure: Never   • Smokeless tobacco: Never    • Tobacco comments:     minimal caffeine   Vaping Use   • Vaping Use: Never used   Substance and Sexual Activity   • Alcohol use: Not Currently     Alcohol/week: 1.0 standard drink     Types: 1 Cans of beer per week     Comment: This is rare.   • Drug use: Never   • Sexual activity: Never       REVIEW OF SYSTEMS  Review of Systems    All systems reviewed and negative except for those discussed in HPI.     PHYSICAL EXAM    I have reviewed the triage vital signs and nursing notes.  Vitals:    06/01/23 2301   BP: 149/91   Pulse: 73   Resp:    Temp:    SpO2:      Physical Exam    GENERAL: not distressed, pleasant and cooperative  HENT: nares patent   EYES: no scleral icterus, PERRL  NECK: no ROM limitations  CV: regular rhythm, regular rate, murmur  RESPIRATORY: normal effort, clear to auscultate bilateral  ABDOMEN: soft  : deferred  MUSCULOSKELETAL: no deformity  NEURO: alert, moves all extremities, follows commands    Pt has PTA peripheral vision loss, he has right sided diminished sensation but normal strength BUE and BLE.  He is not ataxic on today's exam.  Cerebellar intact.  No drifting or loss sensation.   SKIN: warm, dry        LAB RESULTS  Recent Results (from the past 24 hour(s))   Comprehensive Metabolic Panel    Collection Time: 06/01/23  9:56 PM    Specimen: Blood   Result Value Ref Range    Glucose 197 (H) 65 - 99 mg/dL    BUN 10 6 - 20 mg/dL    Creatinine 0.92 0.76 - 1.27 mg/dL    Sodium 134 (L) 136 - 145 mmol/L    Potassium 4.1 3.5 - 5.2 mmol/L    Chloride 102 98 - 107 mmol/L    CO2 24.6 22.0 - 29.0 mmol/L    Calcium 9.3 8.6 - 10.5 mg/dL    Total Protein 6.8 6.0 - 8.5 g/dL    Albumin 4.3 3.5 - 5.2 g/dL    ALT (SGPT) 25 1 - 41 U/L    AST (SGOT) 22 1 - 40 U/L    Alkaline Phosphatase 66 39 - 117 U/L    Total Bilirubin 0.2 0.0 - 1.2 mg/dL    Globulin 2.5 gm/dL    A/G Ratio 1.7 g/dL    BUN/Creatinine Ratio 10.9 7.0 - 25.0    Anion Gap 7.4 5.0 - 15.0 mmol/L    eGFR 101.3 >60.0 mL/min/1.73   Protime-INR     Collection Time: 06/01/23  9:56 PM    Specimen: Blood   Result Value Ref Range    Protime 13.7 11.7 - 14.2 Seconds    INR 1.04 0.90 - 1.10   aPTT    Collection Time: 06/01/23  9:56 PM    Specimen: Blood   Result Value Ref Range    PTT 31.1 22.7 - 35.4 seconds   High Sensitivity Troponin T    Collection Time: 06/01/23  9:56 PM    Specimen: Blood   Result Value Ref Range    HS Troponin T 33 (H) <15 ng/L   CBC Auto Differential    Collection Time: 06/01/23  9:56 PM    Specimen: Blood   Result Value Ref Range    WBC 9.58 3.40 - 10.80 10*3/mm3    RBC 5.41 4.14 - 5.80 10*6/mm3    Hemoglobin 14.6 13.0 - 17.7 g/dL    Hematocrit 44.5 37.5 - 51.0 %    MCV 82.3 79.0 - 97.0 fL    MCH 27.0 26.6 - 33.0 pg    MCHC 32.8 31.5 - 35.7 g/dL    RDW 12.6 12.3 - 15.4 %    RDW-SD 37.6 37.0 - 54.0 fl    MPV 9.6 6.0 - 12.0 fL    Platelets 237 140 - 450 10*3/mm3    Neutrophil % 81.0 (H) 42.7 - 76.0 %    Lymphocyte % 12.3 (L) 19.6 - 45.3 %    Monocyte % 5.1 5.0 - 12.0 %    Eosinophil % 0.8 0.3 - 6.2 %    Basophil % 0.4 0.0 - 1.5 %    Immature Grans % 0.4 0.0 - 0.5 %    Neutrophils, Absolute 7.75 (H) 1.70 - 7.00 10*3/mm3    Lymphocytes, Absolute 1.18 0.70 - 3.10 10*3/mm3    Monocytes, Absolute 0.49 0.10 - 0.90 10*3/mm3    Eosinophils, Absolute 0.08 0.00 - 0.40 10*3/mm3    Basophils, Absolute 0.04 0.00 - 0.20 10*3/mm3    Immature Grans, Absolute 0.04 0.00 - 0.05 10*3/mm3    nRBC 0.0 0.0 - 0.2 /100 WBC   ECG 12 Lead Syncope    Collection Time: 06/01/23 10:31 PM   Result Value Ref Range    QT Interval 441 ms       Ordered the above labs and independently interpreted results.  My findings will be discussed in the ED course or medical decision making section below    RADIOLOGY RESULTS  XR Chest 1 View    Result Date: 6/1/2023  SINGLE VIEW OF THE CHEST  HISTORY: Dizziness and hypertension  COMPARISON: 04/28/2022  FINDINGS: Heart size is within normal limits. No pneumothorax, pleural effusion, or acute infiltrate is seen.      No acute findings.  This  report was finalized on 6/1/2023 11:12 PM by Dr. Toya Weaver M.D.         Ordered the above noted radiological studies.  Independently interpreted by me .  My findings will be discussed in the medical decision section below.     PROGRESS, DATA ANALYSIS, CONSULTS AND MEDICAL DECISION MAKING    Please note that this section constitutes my independent interpretation of clinical data including lab results, radiology, EKG's.  This constitutes my independent professional opinion regarding differential diagnosis and management of this patient.  It may include any factors such as history from outside sources, review of external records, social determinants of health, management of medications, response to those treatments, and discussions with other providers.      ED Course as of 06/02/23 0004   u Jun 01, 2023 2148 I discussed pt's presenting symptoms with Dr. Luciano, Stroke neurologist.  She would like CT head without contrast and then admission for MRI.   [EW]   2234 EKG independently viewed and contemporaneously interpreted by ED physician. Time: 10:31 AM.  Rate 73.  Interpretation: Normal sinus rhythm, normal axis, first-degree AV block, left bundle branch block, appropriate discordant ST changes, Sgarbossa criteria negative. [JG]   Fri Jun 02, 2023   0001 I discussed admission with JUAN DANIEL Lazo in Observation unit.  He agrees to admit the patient.  I have viewed and independently interpreted the CT scan and do not appreciate any acute ICH.  [EW]      ED Course User Index  [EW] Alysa Santos APRN  [JG] Chon Galaviz MD       Orders placed during this visit:  Orders Placed This Encounter   Procedures   • CT Head Without Contrast   • XR Chest 1 View   • Comprehensive Metabolic Panel   • Protime-INR   • aPTT   • High Sensitivity Troponin T   • CBC Auto Differential   • High Sensitivity Troponin T 2Hr   • Pulse Oximetry, Continuous   • ECG 12 Lead Syncope   • SCANNED - TELEMETRY     • Insert Peripheral IV    • Initiate ED Observation Status   • CBC & Differential              MDM patient has a history of stroke and is not anticoagulated.  He presents with acute onset of balance problems today and dizziness.  Denies any acute visual changes or different than usual baseline symptoms.  CT head unremarkable we will plan to admit to observation unit for further work-up with MRI.  He had no complaints of chest pain, tightness or pressure.     Reviewed pt's history and workup with Dr. Galaviz.  After a bedside evaluation, Dr. Galaviz agrees with the plan of care.    Based on the patient's lab findings and presenting symptoms, the doctor and I feel it is appropriate to admit the patient for further management, evaluation, and treatment.  I have discussed this with the admitting team.  I have also discussed this with the patient/family.  They are in agreement with admission.        DIAGNOSIS  Final diagnoses:   Balance problem   Dizziness   History of stroke       Admission      Latest Documented Vital Signs:  As of 00:04 EDT  BP- 149/91 HR- 73 Temp- 97.9 °F (36.6 °C) O2 sat- 96%    Appropriate PPE utilized throughout this patient encounter to include mask, if indicated, per current protocol. Hand hygiene was performed before donning PPE and after removal when leaving the room.    Please note that portions of this were completed with a voice recognition program.     Note Disclaimer: At [facility], we believe that sharing information builds trust and better relationships. You are receiving this note because you are receiving care at [facility] or recently visited. It is possible you will see health information before a provider has talked with you about it. This kind of information can be easy to misunderstand. To help you fully understand what it means for your health, we urge you to discuss this note with your provider.                 Alysa Santos, APRN  06/02/23 0006

## 2023-06-02 NOTE — OUTREACH NOTE
Prep Survey    Flowsheet Row Responses   [facility] patient discharged from? [place]   Is LACE score < 7 ? Yes   Eligibility [facility]   Date of Admission 06/02/23   Date of Discharge 06/02/23   Discharge Disposition Home or Self Care   Discharge diagnosis Dizziness   Does the patient have one of the following disease processes/diagnoses(primary or secondary)? Other   Does the patient have Home health ordered? No   Is there a DME ordered? No   Prep survey completed? Yes          Ngozi KENNEY - Registered Nurse

## 2023-06-02 NOTE — ED NOTES
Patient arrived to ED via EMS for dizziness and HTN. Pt's highest 154/108 and went to  and they called EMS for transport to hospital.   EMS performed NIH and it was a 0. Pt has R sided residual from previous stroke.   Pt has hx of stroke in 2017 and hx of seizures. Last seizure was 04/28/2022.

## 2023-06-02 NOTE — DISCHARGE INSTRUCTIONS
Follow-up with your primary care provider.  Return to the emergency department with worsening symptoms, uncontrolled pain, inability to tolerate oral liquids, fever greater than 101°F not controlled by Tylenol or as needed with emergent concerns.

## 2023-06-02 NOTE — CONSULTS
Date of Consultation: 23    Referral Provider: Tobias Anna MD     Reason for Consultation: Elevated troponin, dizziness.    Encounter Provider: Jeremiah Bradford MD    Group of Service: [facility]     Patient Name: Gaurav Mora    :1970    Chief complaint: Dizziness.  History of Present Illness: Gaurav Mora is a 52 year old with a past medical history significant for hypertension, expressive aphasia, obstructive sleep apnea, hemorrhagic stroke () who originally presented to urgent care with complaints of dizziness, balance issues, and elevated blood pressure. His blood pressure at urgent care was 154/108 and he was transported to [facility] ED via EMS. He has right sided sensory deficits, visual changes, and speech changes at baseline.     His initial HS Troponin was 33 with a repeat that increased to 41, then 40. CT of head showed no acute findings.  He has had no chest pain or shortness of breath out of the ordinary.  The patient and his father were more concerned about the blood pressure because of his history of hemorrhagic stroke secondary to hypertension in the past.  He also has a chronically elevated CK level in the past, which has been worked up extensively (including muscle biopsy).  He also experienced a seizure in 2022 which has not recurred.      ECHO 17  Transthoracic real-time echocardiogram, complete, with 2-D and    m-mode recording, pulsed and continuous wave spectral doppler, and    color flow velocity mapping performed, with the following findings:    The visual ejection fraction is 60%.    Average global longitudinal strain (GLS) - 7.6 %.    Indeterminate LV diastolic function.    Left atrium is borderline enlarged.    IVC appears mildly dilated in size with <50% respiratory variation.    Aortic root mildly dilated in size, measuring 3.8 cm.    Proximal ascending aorta mildly dilated in size, measuring 3.6 cm.       Past Medical  History:   Diagnosis Date   • Elevated CK    • Environmental allergies    • Headache, tension-type    • Hemorrhagic cerebrovascular accident (CVA)    • Hemorrhagic stroke 11/24/2017    Residual peripheral vision loss, right-sided sensation loss   • History of bladder infections 12/2017   • Hyperlipidemia    • Hypertension    • Lung nodule    • New onset seizure 04/28/2022   • SHANE on CPAP     WEARS CPAP   • PONV (postoperative nausea and vomiting)    • Seizure 04/28/2022   • Stroke 2017         Past Surgical History:   Procedure Laterality Date   • APPENDECTOMY N/A 1987   • COLONOSCOPY N/A 02/17/2022    Procedure: COLONOSCOPY into  cecum with cold bx polypectomy;  Surgeon: Jeronimo Paul Jr., MD;  Location: [facility] ENDOSCOPY;  Service: General;  Laterality: N/A;  pre: screen  post: polyp    • MUSCLE BIOPSY Left 08/19/2020    Procedure: quadriceps muscle biopsy;  Surgeon: Paulina Nina MD;  Location: [facility] MAIN OR;  Service: General;  Laterality: Left;         No Known Allergies      No current facility-administered medications on file prior to encounter.     Current Outpatient Medications on File Prior to Encounter   Medication Sig Dispense Refill   • acetaminophen (TYLENOL) 325 MG tablet Take 2 tablets by mouth Every 4 (Four) Hours As Needed for Mild Pain.     • amLODIPine (NORVASC) 10 MG tablet TAKE 1 TABLET DAILY 90 tablet 1   • aspirin 81 MG EC tablet Take 1 tablet by mouth Daily.     • cholecalciferol (VITAMIN D3) 25 MCG (1000 UT) tablet Take 1 tablet by mouth Daily.     • ipratropium (ATROVENT) 0.06 % nasal spray      • labetalol (NORMODYNE) 100 MG tablet TAKE 1 TABLET THREE TIMES A  tablet 5   • levocetirizine (XYZAL) 5 MG tablet Take 1 tablet by mouth Every Evening.     • lisinopril (PRINIVIL,ZESTRIL) 20 MG tablet TAKE 1 TABLET DAILY 90 tablet 1   • omeprazole (priLOSEC) 20 MG capsule Take 1 capsule by mouth Daily.     • OXcarbazepine (TRILEPTAL) 600 MG tablet TAKE 1 TABLET TWICE A  tablet 3  "  • pravastatin (PRAVACHOL) 40 MG tablet 1 tablet Daily. 90 tablet 3   • spironolactone (ALDACTONE) 25 MG tablet TAKE 1 TABLET DAILY 90 tablet 1   • vitamin D3 125 MCG (5000 UT) capsule capsule Take 1 capsule by mouth Daily.           Social History     Socioeconomic History   • Marital status: Single   • Number of children: 0   Tobacco Use   • Smoking status: Never     Passive exposure: Never   • Smokeless tobacco: Never   • Tobacco comments:     minimal caffeine   Vaping Use   • Vaping Use: Never used   Substance and Sexual Activity   • Alcohol use: Not Currently     Alcohol/week: 1.0 standard drink     Types: 1 Cans of beer per week     Comment: This is rare.   • Drug use: Never   • Sexual activity: Never         Family History   Problem Relation Age of Onset   • Breast cancer Mother    • Atrial fibrillation Father    • Hypertension Father    • Skin cancer Father    • Colon cancer Paternal Grandmother    • Heart disease Maternal Grandfather    • Parkinsonism Maternal Grandfather    • Heart disease Maternal Aunt    • Heart disease Maternal Uncle    • Malig Hyperthermia Neg Hx        REVIEW OF SYSTEMS:   Pertinent positives are noted in the HPI above.  Otherwise, all other systems were reviewed, and are negative.     Objective:     Vitals:    06/02/23 1259 06/02/23 1417 06/02/23 1420 06/02/23 1424   BP: 120/79 108/88 106/77 125/73   BP Location:  Right arm  Right arm   Patient Position:  Lying Standing Standing   Pulse: 81 82 83 81   Resp:       Temp:       TempSrc:       SpO2:       Weight: 113 kg (249 lb)      Height: 175.3 cm (69\")        Body mass index is 36.77 kg/m².  Flowsheet Rows    Flowsheet Row First Filed Value   Admission Height 175.3 cm (69\") Documented at 06/01/2023 2117   Admission Weight 113 kg (250 lb) Documented at 06/01/2023 2117           General:    No acute distress, alert and oriented x4, pleasant                   Head:    Normocephalic, atraumatic.   Eyes:          Conjunctivae and sclerae " normal, no icterus.   Throat:   No oral lesions, no thrush, oral mucosa moist.    Neck:   Supple, trachea midline.   Lungs:     Clear to auscultation bilaterally     Heart:    Regular rhythm and normal rate.  II/VI SM LUSB.   Abdomen:     Soft, non-tender, non-distended, positive bowel sounds.    Extremities:   No clubbing, cyanosis, or edema.     Pulses:   Pulses palpable and equal bilaterally.    Skin:   No bleeding or rash.   Neuro:   Non-focal.  Moves all extremities well.    Psychiatric:   Normal mood and affect.       Lab Review:                Results from last 7 days   Lab Units 06/02/23  0029   SODIUM mmol/L 137   POTASSIUM mmol/L 4.1   CHLORIDE mmol/L 102   CO2 mmol/L 26.0   BUN mg/dL 10   CREATININE mg/dL 0.84   GLUCOSE mg/dL 101*   CALCIUM mg/dL 8.6     Results from last 7 days   Lab Units 06/02/23  0400 06/02/23  0029 06/01/23  2156   HSTROP T ng/L 40* 41* 33*     Results from last 7 days   Lab Units 06/01/23  2156   WBC 10*3/mm3 9.58   HEMOGLOBIN g/dL 14.6   HEMATOCRIT % 44.5   PLATELETS 10*3/mm3 237     Results from last 7 days   Lab Units 06/01/23  2156   INR  1.04   APTT seconds 31.1                   EKG (reviewed by me personally):            Assessment:   1.  Dizziness, likely vertigo  2.  Nonspecific high-sensitivity troponin elevation  3.  Hypertension  4.  Left bundle branch block, chronic  5.  Chronically elevated CPK level (status post extensive work-up, including muscle biopsy)  6.  History of hemorrhagic CVA secondary to hypertensive crisis in 2017  7.  Seizure disorder, initially in April 2022  8.  SHANE, on CPAP    Plan:       I really did not feel that the high-sensitivity troponin level was significant.  This is not from a type I or type II NSTEMI.  He is asymptomatic without angina.  He has a chronic left bundle branch block.  He had a slight murmur on exam, and I decided to get an echocardiogram because of this and the chronic left bundle branch block.  He did not have any wall motion  abnormalities, and his ejection fraction was normal.  His aortic valve was not ideally visualized, although there was some calcification, which is likely where the murmur originates (there was no aortic stenosis).    Similarly, I do not feel that his dizziness is from a cardiac source.  This sounds like true dizziness and is likely from vertigo.  His blood pressure has been elevated at times at home more than baseline, although here, it has come back down to normal for the most part.  I decided not to alter his regimen for now.  His father is an excellent caretaker, and watches his blood pressure very closely given his history of hemorrhagic stroke.  I kept him on the labetalol, lisinopril, amlodipine, and spironolactone at the current doses.      He will remain on pravastatin and aspirin as well.  He is not on a higher intensity statin because of his chronically elevated CK level which has been over 1000 in the past.  Again, he has had muscle biopsies and multiple other tests which have failed to show any significant pathology behind the elevated CK level.    I feel he is stable for discharge at this time.  He will follow-up with Dr. Coates as an outpatient in our group.    Thank you very much for this consult.    Keanu Bradford MD

## 2023-06-02 NOTE — H&P
.   [facility]   HISTORY AND PHYSICAL    Patient Name: Gaurav Mora  : 1970  MRN: [id number]  Primary Care Physician:  Rebecca Nixon PA  Date of admission: 2023    Subjective   Subjective     Chief Complaint: Dizziness    HPI:    Gaurav Mora is a 52 y.o. male with past medical history including but not limited to hypertension, hyperlipidemia, expressive aphasia secondary to hemorrhagic CVA, and seizure presents to [facility] with sudden onset of dizziness that started around 3 PM.  Patient describes his dizziness as off-balance feeling.  Denies spinning like sensation.  Symptoms worsened with head movement and with walking, improved with rest.  Patient went to urgent care and was sent to the ED for further evaluation.  Patient reports hemorrhagic stroke in 2017 which left him with right-sided numbness and expressive aphasia.  Patient denies  worsening expressive aphasia or right-sided numbness.  Denies facial droop, unilateral weakness or headache.  Denies chest pain, palpitation, dyspnea.  Denies abdominal pain, nausea, vomiting, or diarrhea.  Denies cough, fever, or lower extremity    Laboratory evaluation in the ED include high-sensitivity troponin 33, creatinine 0.92, glucose 197, WBC 9.5, hemoglobin 14.6 and platelets 237.  Chest x-ray negative for acute infiltration  CT head without negative for acute intracranial findings    Review of Systems   All systems were reviewed and negative except for: The mentioned above in HPI    Personal History     Past Medical History:   Diagnosis Date   • Elevated CK    • Environmental allergies    • Headache, tension-type    • Hemorrhagic cerebrovascular accident (CVA)    • Hemorrhagic stroke 2017    Residual peripheral vision loss, right-sided sensation loss   • History of bladder infections 2017   • Hyperlipidemia    • Hypertension    • Lung nodule    • New onset seizure 2022   • SHANE on CPAP     WEARS CPAP   • PONV  (postoperative nausea and vomiting)    • Seizure 04/28/2022   • Stroke 2017       Past Surgical History:   Procedure Laterality Date   • APPENDECTOMY N/A 1987   • COLONOSCOPY N/A 02/17/2022    Procedure: COLONOSCOPY into  cecum with cold bx polypectomy;  Surgeon: Jeronimo Paul Jr., MD;  Location: [facility] ENDOSCOPY;  Service: General;  Laterality: N/A;  pre: screen  post: polyp    • MUSCLE BIOPSY Left 08/19/2020    Procedure: quadriceps muscle biopsy;  Surgeon: Paulina Nina MD;  Location: [facility] MAIN OR;  Service: General;  Laterality: Left;       Family History: family history includes Atrial fibrillation in his father; Breast cancer in his mother; Colon cancer in his paternal grandmother; Heart disease in his maternal aunt, maternal grandfather, and maternal uncle; Hypertension in his father; Parkinsonism in his maternal grandfather; Skin cancer in his father. Otherwise pertinent FHx was reviewed and not pertinent to current issue.    Social History:  reports that he has never smoked. He has never been exposed to tobacco smoke. He has never used smokeless tobacco. He reports that he does not currently use alcohol after a past usage of about 1.0 standard drink per week. He reports that he does not use drugs.    Home Medications:  OXcarbazepine, acetaminophen, amLODIPine, aspirin, cholecalciferol, ipratropium, labetalol, levocetirizine, lisinopril, omeprazole, pravastatin, spironolactone, and vitamin D3    Allergies:  No Known Allergies    Objective   Objective     Vitals:   Temp:  [97.9 °F (36.6 °C)-98.9 °F (37.2 °C)] 97.9 °F (36.6 °C)  Heart Rate:  [67-89] 67  Resp:  [16-18] 18  BP: (121-156)/() 121/78  Physical Exam    Constitutional: Awake, alert   Eyes: PERRLA, sclerae anicteric, no conjunctival injection   HENT: NCAT, mucous membranes moist   Neck: Supple, no thyromegaly, no lymphadenopathy, trachea midline   Respiratory: Clear to auscultation bilaterally, nonlabored respirations    Cardiovascular:  RRR, no murmurs, rubs, or gallops, palpable pedal pulses bilaterally   Gastrointestinal: Positive bowel sounds, soft, nontender, nondistended   Musculoskeletal: No bilateral ankle edema, no clubbing or cyanosis to extremities   Psychiatric: Appropriate affect, cooperative   Neurologic: Oriented x 3, strength symmetric in all extremities, Cranial Nerves grossly intact to confrontation, speech clear   Skin: No rashes     Result Review    Result Review:  I have personally reviewed the results from the time of this admission to 6/2/2023 00:46 EDT and agree with these findings:  []  Laboratory list / accordion  []  Microbiology  []  Radiology  []  EKG/Telemetry   []  Cardiology/Vascular   []  Pathology  []  Old records  []  Other:    Assessment & Plan   Assessment / Plan     Brief Patient Summary:  Gaurav Mora is a 52 y.o. male who was seen and evaluated the ED for dizziness.  Patient is being admitted to observation for further evaluation and neurology consult.    Active Hospital Problems:  Active Hospital Problems    Diagnosis    • **Dizziness      Plan:   Dizziness  -CT head without negative for acute intracranial finding  -MRI brain without pending  -Meclizine as needed  -Neurology consult    Elevated troponin  -Initial troponin 33, repeat 41 with a delta of 8  -EKG shows sinus rhythm, prolonged GA interval and LBBB  -Cardiology consult  -Cardiac monitoring    Hypertension  -Continue amlodipine, lisinopril and labetalol  -Vital signs per nursing    Hyperlipidemia  -Continue pravastatin    Seizure  -Continue oxcarbazepine    DVT prophylaxis:  Mechanical DVT prophylaxis orders are present.    CODE STATUS:    Level Of Support Discussed With: Patient  Code Status (Patient has no pulse and is not breathing): CPR (Attempt to Resuscitate)  Medical Interventions (Patient has pulse or is breathing): Full Support    Admission Status:  I believe this patient meets observation status.    Total of 75 minutes have been spent on  this H&P including face-to-face encounter and reviewing labs and imaging    Electronically signed by JUAN DANIEL Nevarez, 06/02/23, 12:46 AM EDT.

## 2023-06-02 NOTE — PLAN OF CARE
"Goal Outcome Evaluation:  Plan of Care Reviewed With: patient, father    Pt is 53 y/o M admitted to ED obs on 6/2/23 for increased dizziness. imaging ruled out any acute neurologic issues. Pt with hx of CVA resulting in R sided numbness and weakness. PT consulted for vestibular needs. Performed hallpike orlin and found to be negative bilat with no sxs reproduced. Did not perform Epley as not indicated. Pt is indep with bed mobility and transfers - mod I/SBA for ambulation with reports of feeling \"off\" but not dizzy. Pt safe to return home at this time, but did educate pt on effects of CVA on vestibular system and importance of OP PT for vestibular follow up. In regards to mobilty pt is at baseline, no acute PT indicated                     "

## 2023-06-02 NOTE — PLAN OF CARE
Goal Outcome Evaluation:  Plan of Care Reviewed With: patient        Progress: no change  Outcome Evaluation: A/O, no complaints of pain, SBA, right sided weakness from previous stroke, MRI and CT head completed, NIH 0, NPO, cardiology and neurology consult pending, VSS, will continue to monitor.

## 2023-06-02 NOTE — ED PROVIDER NOTES
MD ATTESTATION NOTE  I wore full protective equipment throughout this patient encounter including an N95 face mask, googles, gown and gloves. Hand hygiene was performed before donning protective equipment and after removal when leaving the room.    The MAN and I have discussed this patient's history, physical exam, and treatment plan. I have reviewed the documentation and personally had a face to face interaction with the patient. I affirm the MAN documentation and agree with their diagnostics, findings, treatment, plan, and disposition.    I provided a substantive portion of the care of this patient.  I personally performed the physical exam, in its entirety.  The attached note describes my personal findings.    PCP: Rebecca Nixon PA  Patient Care Team:  Rebecca Nixon PA as PCP - General (Family Medicine)     Gaurav Mora is a 52 y.o. male who presents to the ED c/o dizziness.  Patient reports symptoms would be worse when standing or ambulating but did not resolve with being seated or laying down, symptoms consistent continued for 2 hours, patient reports symptoms now resolved although last time he ambulated he did say that he felt slightly off balance.  Patient reports that he was at baseline health prior to this occurrence.  No headache, no double vision, no ringing in his ears or hearing loss.  No facial droop, no weakness or numbness.  Patient reports history of hemorrhagic stroke in the past, not on anticoagulation.    On exam:  General: NAD.  Head: NCAT.  ENT: nares patent, no scleral icterus  Neck: Supple, trachea midline.  Cardiac: regular rate and rhythm.  Lungs: normal effort.  Abdomen: Soft, NTTP.   Extremities: Moves all extremities well, no peripheral edema  Neuro: Alert and oriented x3, face symmetric, no facial droop, eyebrows raise equally bilaterally, tongue midline, no dysarthria, no aphasia, extraocular motion intact, no nystagmus, visual acuity grossly normal, cranial nerves II through  XII intact, moves all extremities well, 5 out of 5 strength in all extremities, sensation intact light touch all extremities, no ataxia, no tremor.  Psych: calm, cooperative  Skin: Warm, dry.    NIHSS:  0-->Alert: keenly responsive  0-->Answers both questions correctly  0-->Performs both tasks correctly  0=normal  0=No visual loss  0=Normal symmetric movement  0-->No drift: limb holds 90 (or 45) degrees for full 10 secs  0-->No drift: limb holds 90 (or 45) degrees for full 10 secs  0-->No drift: limb holds 90 (or 45) degrees for full 10 secs  0-->No drift: limb holds 90 (or 45) degrees for full 10 secs  0=Absent  0=Normal; no sensory loss  0=No aphasia, normal  0=Normal  0=No abnormality  Total score: 0      Medical Decision Making:  After the initial H&P, I discussed pertinent information from history and physical exam with patient/family.  Discussed differential diagnosis.  Discussed plan for ED evaluation/work-up/treatment.  All questions answered.  Patient/family is agreeable with plan.    ED Course as of 06/03/23 2248   Thu Jun 01, 2023 2148 I discussed pt's presenting symptoms with Dr. Luciano, Stroke neurologist.  She would like CT head without contrast and then admission for MRI.   [EW]   2234 EKG independently viewed and contemporaneously interpreted by ED physician. Time: 10:31 AM.  Rate 73.  Interpretation: Normal sinus rhythm, normal axis, first-degree AV block, left bundle branch block, appropriate discordant ST changes, Sgarbossa criteria negative. [JG]   Fri Jun 02, 2023   0001 I discussed admission with JUAN DANIEL Lazo in Observation unit.  He agrees to admit the patient.  I have viewed and independently interpreted the CT scan and do not appreciate any acute ICH.  [EW]      ED Course User Index  [EW] Alysa Santos APRN  [JG] Chon Galaviz MD       Diagnosis  Final diagnoses:   Balance problem   Dizziness   History of stroke        Chon Galaviz MD  06/03/23 2248

## 2023-06-02 NOTE — SIGNIFICANT NOTE
06/02/23 1100   OTHER   Discipline physical therapist   Therapy Assessment/Plan (PT)   Criteria for Skilled Interventions Met (PT)   (nsg continues to document pt up adlib indep; CCP assessed and pt reported no DC needs.  AMPAC score is 24.  Pt would be appropriate for Outpatient followup if dizziness persists but as patient is functional, does not present with any acute needs.)

## 2023-06-02 NOTE — SIGNIFICANT NOTE
06/02/23 0650   OTHER   Discipline physical therapist   Therapy Assessment/Plan (PT)   Criteria for Skilled Interventions Met (PT) no problems identified which require skilled intervention  (pt at sba; nih 0; baseline mobiilty; no indication for acute skilled PT.)

## 2023-06-02 NOTE — PROGRESS NOTES
ED OBSERVATION PROGRESS/DISCHARGE SUMMARY    Date of Admission: 6/1/2023   LOS: 0 days   PCP: Rebecca Nixon PA    Subjective  Patient reports feeling improved this afternoon, eager to go home.    Hospital Outcome: 52-year-old male admitted to the observation unit for further evaluation of dizziness.  Patient has CT head without contrast which was negative acute followed by MRI brain without contrast which shows encephalomalacia involving the left cerebral hemisphere, with compensatory dilatation of left lateral ventricle, stable compared to prior exam, otherwise no acute intracranial abnormality.  Patient was also found to have elevated troponin 33, 41, 40.  EKG shows sinus rhythm, prolonged SC interval, left bundle branch block, no acute ST-T changes.  Patient was seen by neurology, Dr. Mesa.  She recommends physical therapy evaluation for Epley maneuver.  Patient was seen by vestibular physical therapy, Epley maneuver was not indicated today.  He will be referred to physical therapy as outpatient.    Patient was seen by cardiology for elevated troponin.  He had echocardiogram which was unremarkable.  Patient is safe for discharge home and can follow-up with cardiology as outpatient.    ROS:  General: no fevers, chills  Respiratory: no cough, dyspnea  Cardiovascular: no chest pain, palpitations  Abdomen: No abdominal pain, nausea, vomiting, or diarrhea  Neurologic: No focal weakness    Objective   Physical Exam:  I have reviewed the vital signs.  Temp:  [97.9 °F (36.6 °C)-98.9 °F (37.2 °C)] 98.2 °F (36.8 °C)  Heart Rate:  [66-89] 66  Resp:  [16-18] 16  BP: (110-156)/() 116/83  General Appearance:    Alert, cooperative, no distress  Head:    Normocephalic, atraumatic  Eyes:    Sclerae anicteric  Neck:   Supple, no mass  Lungs: Clear to auscultation bilaterally, respirations unlabored  Heart: Regular rate and rhythm, S1 and S2 normal, no murmur, rub or gallop  Abdomen:  Soft, non-tender, bowel sounds  active, nondistended  Extremities: No clubbing, cyanosis, or edema to lower extremities  Pulses:  2+ and symmetric in distal lower extremities  Skin: No rashes   Neurologic: Oriented x3, Normal strength to extremities    Results Review:    I have reviewed the labs, radiology results and diagnostic studies.    Results from last 7 days   Lab Units 06/01/23  2156   WBC 10*3/mm3 9.58   HEMOGLOBIN g/dL 14.6   HEMATOCRIT % 44.5   PLATELETS 10*3/mm3 237     Results from last 7 days   Lab Units 06/02/23  0029 06/01/23  2156   SODIUM mmol/L 137 134*   POTASSIUM mmol/L 4.1 4.1   CHLORIDE mmol/L 102 102   CO2 mmol/L 26.0 24.6   BUN mg/dL 10 10   CREATININE mg/dL 0.84 0.92   CALCIUM mg/dL 8.6 9.3   BILIRUBIN mg/dL  --  0.2   ALK PHOS U/L  --  66   ALT (SGPT) U/L  --  25   AST (SGOT) U/L  --  22   GLUCOSE mg/dL 101* 197*     Imaging Results (Last 24 Hours)     Procedure Component Value Units Date/Time    MRI Brain Without Contrast [[id number]] Collected: 06/02/23 0514     Updated: 06/02/23 0525    Narrative:      BRAIN MRI WITH AND WITHOUT CONTRAST     HISTORY: dizziness; R26.89-Other abnormalities of gait and mobility;  R42-Dizziness and giddiness; Z86.73-Personal history of transient  ischemic attack (TIA), and cerebral infarction without residual deficits     COMPARISON: 04/28/2022.     FINDINGS:  Multiplanar images of the head were obtained without  gadolinium. No areas of restricted diffusion are seen to suggest acute  infarct. There is encephalomalacia which is noted within the left  cerebral hemisphere. Appearance is stable when compared to prior exam.  There is some periventricular and deep white matter microangiopathic  change, more significant on the left. There is an old right cerebellar  infarct. Gyriform decreased signal intensity within the left cerebral  hemisphere on susceptibility weighted imaging is favored to be secondary  to chronic hemosiderin deposition from an old infarct. There are also 3  tiny areas of  susceptibility artifact noted within the kiki, as well as  within the bilateral cerebellar hemispheres. These may represent areas  of chronic hemosiderin deposition. There appearance is stable when  compared to prior exam. There is mucosal thickening within the paranasal  sinuses, as well as bilateral maxillary sinus mucous retention cysts.  There is a left mastoid effusion.       Impression:      1. No acute intracranial abnormality.  There is encephalomalacia  involving the left cerebral hemisphere, with compensatory dilatation of  the left lateral ventricle. This is stable when compared to prior exam.     This report was finalized on 6/2/2023 5:22 AM by Dr. Toya Weaver M.D.       CT Head Without Contrast [[id number]] Collected: 06/02/23 0020     Updated: 06/02/23 0027    Narrative:      CT OF THE HEAD WITHOUT CONTRAST     HISTORY: Dizziness and balance     COMPARISON: 04/28/2022     TECHNIQUE: Axial CT imaging was obtained through the brain. No IV  contrast was administered.     FINDINGS:  No acute intracranial hemorrhage is seen. Patient has mild atrophy,  somewhat advanced for the age of 52. There is no midline shift or mass  effect. There is periventricular and deep white matter microangiopathic  change. There is some mucosal thickening within the ethmoid sinuses.  There is partial opacification of left mastoid air cells. This is more  apparent than on the prior study.       Impression:      No acute intracranial findings.     Radiation dose reduction techniques were utilized, including automated  exposure control and exposure modulation based on body size.     This report was finalized on 6/2/2023 12:23 AM by Dr. Toya Weaver M.D.       XR Chest 1 View [[id number]] Collected: 06/01/23 2312     Updated: 06/01/23 2315    Narrative:      SINGLE VIEW OF THE CHEST     HISTORY: Dizziness and hypertension     COMPARISON: 04/28/2022     FINDINGS:  Heart size is within normal limits. No pneumothorax,  pleural effusion,  or acute infiltrate is seen.       Impression:      No acute findings.     This report was finalized on 6/1/2023 11:12 PM by Dr. Toya Weaver M.D.             I have reviewed the medications.  ---------------------------------------------------------------------------------------------  Assessment & Plan   Assessment/Problem List    Dizziness      Plan:    Dizziness  -CT head without negative for acute intracranial finding  -MRI brain without negative acute   -Meclizine as needed  -Neurology consult, Dr. Mesa  -Follow up outpatient with vestibular physical therapy      Elevated troponin  -Initial troponin 33, repeat 41 with a delta of 8  -EKG shows sinus rhythm, prolonged OK interval and LBBB  -Cardiology consult, Dr. Bradford  -Echocardiogram unremarkable, safe for discharge home     Hypertension  -Continue amlodipine, lisinopril and labetalol  -Vital signs per nursing     Hyperlipidemia  -Continue pravastatin     Seizure  -Continue oxcarbazepine    Disposition: Home    Follow-up after Discharge: PCP, cardiology, neurology    36 minutes has been spent by [facility] providers in the care of this patient while under observation status     This note will serve as discharge summary    PENNY Rm 06/02/23 07:40 EDT    I have worn appropriate PPE during this patient encounter and sanitized my hands with entering and exiting patient room.

## 2023-06-03 ENCOUNTER — TRANSITIONAL CARE MANAGEMENT TELEPHONE ENCOUNTER (OUTPATIENT)
Dept: CALL CENTER | Facility: HOSPITAL | Age: 53
End: 2023-06-03

## 2023-06-03 NOTE — OUTREACH NOTE
Call Center TCM Note      Flowsheet Row Responses   [facility] patient discharged from? [place]   Does the patient have one of the following disease processes/diagnoses(primary or secondary)? Other   TCM attempt successful? Yes   Call start time 1140   Call end time 1143   Discharge diagnosis Dizziness   Meds reviewed with patient/caregiver? Yes   Is the patient having any side effects they believe may be caused by any medication additions or changes? No   Does the patient have all medications ordered at discharge? N/A   Is the patient taking all medications as directed (includes completed medication regime)? Yes   Comments Pt has an already scheduled appt on 6/12/23 @10am   Does the patient have an appointment with their PCP within 7 days of discharge? No   Nursing Interventions Confirmed date/time of appointment   Has home health visited the patient within 72 hours of discharge? N/A   Psychosocial issues? No   Did the patient receive a copy of their discharge instructions? Yes   Nursing interventions Reviewed instructions with patient   What is the patient's perception of their health status since discharge? Improving   Is the patient/caregiver able to teach back the hierarchy of who to call/visit for symptoms/problems? PCP, Specialist, Home health nurse, Urgent Care, ED, 911 Yes   TCM call completed? Yes   Call end time 1143   Would this patient benefit from a Referral to Amb Social Work? No   Is the patient interested in additional calls from an ambulatory ?  NOTE:  applies to high risk patients requiring additional follow-up. No            Rebecca Arora RN    6/3/2023, 11:44 EDT

## 2023-06-09 ENCOUNTER — OFFICE VISIT (OUTPATIENT)
Dept: CARDIOLOGY | Facility: CLINIC | Age: 53
End: 2023-06-09
Payer: MEDICARE

## 2023-06-09 VITALS
BODY MASS INDEX: 37.47 KG/M2 | HEART RATE: 63 BPM | DIASTOLIC BLOOD PRESSURE: 70 MMHG | WEIGHT: 253 LBS | SYSTOLIC BLOOD PRESSURE: 110 MMHG | HEIGHT: 69 IN

## 2023-06-09 DIAGNOSIS — R01.1 SYSTOLIC MURMUR: ICD-10-CM

## 2023-06-09 DIAGNOSIS — I70.0 AORTIC CALCIFICATION: ICD-10-CM

## 2023-06-09 DIAGNOSIS — I10 ESSENTIAL HYPERTENSION: Primary | ICD-10-CM

## 2023-06-09 PROCEDURE — 3078F DIAST BP <80 MM HG: CPT | Performed by: NURSE PRACTITIONER

## 2023-06-09 PROCEDURE — 3074F SYST BP LT 130 MM HG: CPT | Performed by: NURSE PRACTITIONER

## 2023-06-09 PROCEDURE — 99214 OFFICE O/P EST MOD 30 MIN: CPT | Performed by: NURSE PRACTITIONER

## 2023-06-09 PROCEDURE — 1160F RVW MEDS BY RX/DR IN RCRD: CPT | Performed by: NURSE PRACTITIONER

## 2023-06-09 PROCEDURE — 1159F MED LIST DOCD IN RCRD: CPT | Performed by: NURSE PRACTITIONER

## 2023-06-09 NOTE — PROGRESS NOTES
Date of Office Visit: 2023  Encounter Provider: JUAN DANIEL Dow  Place of Service: [facility] CARDIOLOGY  Patient Name: Gaurav Mora  :1970    Chief Complaint   Patient presents with   • Hypertension     1 year f/u   :     HPI: Gaurav Mora is a 52 y.o. male.  He is a patient with hypertension, left bundle branch block, mild ascending aortic aneurysm, and obstructive sleep apnea.  In , he suffered an extensive hemorrhagic CVA due to hypertension.  Additionally, he has a chronically elevated CK (over 1000 in the past) which has been worked up extensively.  No significant pathology has been identified.   He was last seen in the office by Dr. Coates in January of this year at which time he was doing well.  No changes were made to his regimen, and he was advised to follow-up in 1 year.   On , he presented to the ED with dizziness and hypertension.  His initial high-sensitivity troponin was 33 with a repeat of 41.  Work-up was unrevealing.  He was seen in consultation by Dr. Bradford.  Dr. Bradford did not feel the troponin level was significant.  He did have a slight murmur on exam for which an echocardiogram was obtained.  This demonstrated normal LV systolic function and a moderately calcified aortic valve.  His blood pressure normalized without further intervention.  No changes were made to his regimen.  He is here today for follow-up.   He has been doing well.  He denies any chest pain, shortness of breath, palpitations, edema, dizziness, or syncope.  His blood pressures have been stable at home.    Past Medical History:   Diagnosis Date   • Elevated CK    • Environmental allergies    • Headache, tension-type    • Hemorrhagic cerebrovascular accident (CVA)    • Hemorrhagic stroke 2017    Residual peripheral vision loss, right-sided sensation loss   • History of bladder infections 2017   • Hyperlipidemia    • Hypertension    • Lung nodule    •  New onset seizure 04/28/2022   • SHANE on CPAP     WEARS CPAP   • PONV (postoperative nausea and vomiting)    • Seizure 04/28/2022   • Stroke 2017       Past Surgical History:   Procedure Laterality Date   • APPENDECTOMY N/A 1987   • COLONOSCOPY N/A 02/17/2022    Procedure: COLONOSCOPY into  cecum with cold bx polypectomy;  Surgeon: Jeronimo Paul Jr., MD;  Location: [facility] ENDOSCOPY;  Service: General;  Laterality: N/A;  pre: screen  post: polyp    • MUSCLE BIOPSY Left 08/19/2020    Procedure: quadriceps muscle biopsy;  Surgeon: Paulina Nina MD;  Location: [facility] MAIN OR;  Service: General;  Laterality: Left;       Social History     Socioeconomic History   • Marital status: Single   • Number of children: 0   Tobacco Use   • Smoking status: Never     Passive exposure: Never   • Smokeless tobacco: Never   • Tobacco comments:     minimal caffeine   Vaping Use   • Vaping Use: Never used   Substance and Sexual Activity   • Alcohol use: Not Currently     Alcohol/week: 1.0 standard drink     Types: 1 Cans of beer per week     Comment: This is rare.   • Drug use: Never   • Sexual activity: Never       Family History   Problem Relation Age of Onset   • Breast cancer Mother    • Atrial fibrillation Father    • Hypertension Father    • Skin cancer Father    • Colon cancer Paternal Grandmother    • Heart disease Maternal Grandfather    • Parkinsonism Maternal Grandfather    • Heart disease Maternal Aunt    • Heart disease Maternal Uncle    • Malig Hyperthermia Neg Hx        Review of Systems   Constitutional: Negative.   Cardiovascular: Negative.  Negative for chest pain, dyspnea on exertion, leg swelling, orthopnea, paroxysmal nocturnal dyspnea and syncope.   Respiratory: Negative.    Hematologic/Lymphatic: Negative for bleeding problem.   Musculoskeletal: Negative for falls.   Gastrointestinal: Negative for melena.   Neurological: Negative for dizziness and light-headedness.       No Known Allergies      Current  "Outpatient Medications:   •  acetaminophen (TYLENOL) 325 MG tablet, Take 2 tablets by mouth Every 4 (Four) Hours As Needed for Mild Pain., Disp: , Rfl:   •  amLODIPine (NORVASC) 10 MG tablet, TAKE 1 TABLET DAILY, Disp: 90 tablet, Rfl: 1  •  aspirin 81 MG EC tablet, Take 1 tablet by mouth Daily., Disp: , Rfl:   •  cholecalciferol (VITAMIN D3) 25 MCG (1000 UT) tablet, Take 1 tablet by mouth Daily., Disp: , Rfl:   •  ipratropium (ATROVENT) 0.06 % nasal spray, , Disp: , Rfl:   •  labetalol (NORMODYNE) 100 MG tablet, TAKE 1 TABLET THREE TIMES A DAY, Disp: 180 tablet, Rfl: 5  •  levocetirizine (XYZAL) 5 MG tablet, Take 1 tablet by mouth Every Evening., Disp: , Rfl:   •  lisinopril (PRINIVIL,ZESTRIL) 20 MG tablet, TAKE 1 TABLET DAILY, Disp: 90 tablet, Rfl: 1  •  omeprazole (priLOSEC) 20 MG capsule, Take 1 capsule by mouth Daily., Disp: , Rfl:   •  OXcarbazepine (TRILEPTAL) 600 MG tablet, TAKE 1 TABLET TWICE A DAY, Disp: 180 tablet, Rfl: 3  •  pravastatin (PRAVACHOL) 40 MG tablet, 1 tablet Daily., Disp: 90 tablet, Rfl: 3  •  spironolactone (ALDACTONE) 25 MG tablet, TAKE 1 TABLET DAILY, Disp: 90 tablet, Rfl: 1  •  vitamin D3 125 MCG (5000 UT) capsule capsule, Take 1 capsule by mouth Daily., Disp: , Rfl:       Objective:     Vitals:    06/09/23 1323   BP: 110/70   BP Location: Left arm   Patient Position: Sitting   Pulse: 63   Weight: 115 kg (253 lb)   Height: 175.3 cm (69\")     Body mass index is 37.36 kg/m².    PHYSICAL EXAM:    Neck:      Vascular: No JVD.   Pulmonary:      Effort: Pulmonary effort is normal.      Breath sounds: Normal breath sounds.   Cardiovascular:      Normal rate. Regular rhythm.      Murmurs: There is no murmur.      No gallop. No click. No rub.   Pulses:     Intact distal pulses.         Procedures      Assessment:       Diagnosis Plan   1. Essential hypertension        2. Aortic calcification        3. Systolic murmur          No orders of the defined types were placed in this encounter.       "   Plan:       I think he is doing well.  His blood pressure looks great.  I would continue his current regimen including amlodipine, labetalol, lisinopril, and spironolactone.  His primary care doctor was concerned about his CTA of the chest from May.  His aorta was stable.  It noted  coronary calcification.  However, he denies any anginal symptoms and is already on appropriate medical therapy with aspirin and pravastatin.  He cannot take high intensity statins due to his history of a chronically elevated CK.  I would not recommend any further testing.  He can follow-up with Dr. Coates as scheduled in January.      As always, it has been a pleasure to participate in your patient's care.      Sincerely,         JUAN DANIEL Cat

## 2023-06-13 ENCOUNTER — TELEPHONE (OUTPATIENT)
Dept: FAMILY MEDICINE CLINIC | Facility: CLINIC | Age: 53
End: 2023-06-13

## 2023-06-13 NOTE — TELEPHONE ENCOUNTER
Caller: ALIE BRENNAN    Relationship: Other    Best call back number: [phone]    What is the best time to reach you: BEFORE 4:30 EST TODAY    Do you know the name of the person who called: ALIE BRENNAN    What was the call regarding: CREATINE KINASE TOTAL 713     Is it okay if the provider responds through MyChart: NO

## 2023-07-25 ENCOUNTER — OFFICE VISIT (OUTPATIENT)
Dept: SLEEP MEDICINE | Facility: HOSPITAL | Age: 53
End: 2023-07-25
Payer: MEDICARE

## 2023-07-25 VITALS
SYSTOLIC BLOOD PRESSURE: 126 MMHG | HEART RATE: 67 BPM | HEIGHT: 69 IN | BODY MASS INDEX: 37.18 KG/M2 | DIASTOLIC BLOOD PRESSURE: 84 MMHG | OXYGEN SATURATION: 96 % | WEIGHT: 251 LBS

## 2023-07-25 DIAGNOSIS — E66.9 OBESITY (BMI 30-39.9): ICD-10-CM

## 2023-07-25 DIAGNOSIS — G47.33 OBSTRUCTIVE SLEEP APNEA: Primary | ICD-10-CM

## 2023-07-25 PROCEDURE — G0463 HOSPITAL OUTPT CLINIC VISIT: HCPCS

## 2023-07-25 NOTE — PROGRESS NOTES
[facility]  [address]  [place] [id number] [phone]    PCP: Rebecca Nixon PA    Reason for visit:  Sleep disorders: SHANE    Gaurav is a 53 y.o.male who was seen in the Sleep Disorders Center today. Annual fu. His machine is making noises and is over 5 yrs old. He sleeps from 11pm to 9am. He uses a ffm. Fits well, replaces regularly. He wakes up rested and no EDS.  [place] Sleepiness Scale is 6. Caffeine 0 per day. Alcohol 0 per week.    Gaurav  reports that he has never smoked. He has never been exposed to tobacco smoke. He has never used smokeless tobacco.    Pertinent Positive Review of Systems of nasal congestion  Rest of Review of Systems was negative as recorded in Sleep Questionnaire.    Patient  has a past medical history of Elevated CK, Environmental allergies, Headache, tension-type, Heart murmur (3/15/23), Hemorrhagic cerebrovascular accident (CVA), Hemorrhagic stroke (11/24/2017), History of bladder infections (12/2017), Hyperlipidemia, Hypertension, Lung nodule, New onset seizure (04/28/2022), SHANE on CPAP, PONV (postoperative nausea and vomiting), Seizure (04/28/2022), and Stroke (2017).     Current Medications:    Current Outpatient Medications:     acetaminophen (TYLENOL) 325 MG tablet, Take 2 tablets by mouth Every 4 (Four) Hours As Needed for Mild Pain., Disp: , Rfl:     amLODIPine (NORVASC) 10 MG tablet, TAKE 1 TABLET DAILY, Disp: 90 tablet, Rfl: 1    aspirin 81 MG EC tablet, Take 1 tablet by mouth Daily., Disp: , Rfl:     cholecalciferol (VITAMIN D3) 25 MCG (1000 UT) tablet, Take 1 tablet by mouth Daily., Disp: , Rfl:     ipratropium (ATROVENT) 0.06 % nasal spray, , Disp: , Rfl:     labetalol (NORMODYNE) 100 MG tablet, TAKE 1 TABLET THREE TIMES A DAY, Disp: 180 tablet, Rfl: 5    levocetirizine (XYZAL) 5 MG tablet, Take 1 tablet by mouth Every Evening., Disp: , Rfl:     lisinopril (PRINIVIL,ZESTRIL) 20 MG tablet, TAKE 1 TABLET DAILY, Disp: 90  "tablet, Rfl: 1    omeprazole (priLOSEC) 20 MG capsule, Take 1 capsule by mouth Daily., Disp: , Rfl:     OXcarbazepine (TRILEPTAL) 600 MG tablet, TAKE 1 TABLET TWICE A DAY, Disp: 180 tablet, Rfl: 3    pravastatin (PRAVACHOL) 40 MG tablet, 1 tablet Daily., Disp: 90 tablet, Rfl: 3    spironolactone (ALDACTONE) 25 MG tablet, TAKE 1 TABLET DAILY, Disp: 90 tablet, Rfl: 1    vitamin D3 125 MCG (5000 UT) capsule capsule, Take 1 capsule by mouth Daily., Disp: , Rfl:    also entered in Sleep Questionnaire         Vital Signs: /84   Pulse 67   Ht 175.3 cm (69.02\")   Wt 114 kg (251 lb)   SpO2 96%   BMI 37.05 kg/m²     Body mass index is 37.05 kg/m².       Tongue: Large       Dentition: good       Pharynx: Posterior pharyngeal pillars are narrow   Mallampatti: IV (only hard palate visible)        General: Alert. Cooperative. Well developed. No acute distress.             Head:  Normocephalic. Symmetrical. Atraumatic.              Nose: No septal deviation. No drainage.          Throat: No oral lesions. No thrush. Moist mucous membranes.    Chest Wall:  Normal shape. Symmetric expansion with respiration. No tenderness.             Neck:  Trachea midline.           Lungs:  Clear to auscultation bilaterally. No wheezes. No rhonchi. No rales. Respirations regular, even and unlabored.            Heart:  Regular rhythm and normal rate. Normal S1 and S2. No murmur.     Abdomen:  Soft, non-tender and non-distended. Normal bowel sounds. No masses.  Extremities:  Moves all extremities well. No edema.    Psychiatric: Normal mood and affect.    Diagnostic data available to date is as below and was reviewed on current visit:  Outside sleep study at [facility] [place] [facility], 3/19/2018: AHI index 14.3 titrated up to 11 cm water pressure    No results found for: IRON, TIBC, FERRITIN    Most current available usage data reviewed on 07/25/2023:        DME Company: [employer]    Prescription to DME for replacement " supplies as below:    full face mask      Description Replacement    Nasal PILLOWS      A 7034 Nasal Pillows  every 3 mth    A 7033 Repl Nasal Pillows  2 per mth    Nasal MASK/CUSHION      A 7034 Nasal Mask/Cushion  every 3 mth    A 7032 Repl Nasal Mask/Cushion  2 per mth    Full Face MASK     x A 7030 Full Face Mask  every 3 mth   x A 7031 Repl Face Mask  1 per mth      A 4604 Heated Tubing  every 3 mth    A 7037 Standard Tubing  every 3 mth   x A 7035 Headgear  every 3 mth   x A 7046 Repl Humidifier Chamber  every 6 yrs   x A 7038 Disposable Filters  2 per mth   x A 7039 Non-disposable Filter  every 6 mth   x A 7036 Chin Strap  every 6 mth     Orders Placed This Encounter   Procedures    SCANNED - PULMONARY RESULTS          Impression:  1. Obstructive sleep apnea    2. Obesity (BMI 30-39.9)        Plan:  Gaurav is fully compliant.  His machine is making noises and is probably beyond useful life.  I will order a new machine auto 8-12.  He will need a compliance check in 3 months.    I reiterated the importance of effective treatment of obstructive sleep apnea with PAP machine.  Cardiovascular health risks of untreated sleep apnea were again reviewed.  Patient was asked to remain cautious if there is persistent hypersomnolence. The benefit of weight loss in reducing severity of obstructive sleep apnea was discussed.  Patient would benefit from adhering to a strict diet to achieve ideal BMI.     Change of PAP supplies regularly is important for effective use.  Change of cushion on the mask or plugs on nasal pillows along with disposable filters once every month and change of mask frame, tubing, headgear and Velcro straps every 6 months at the minimum was reiterated.    This patient is compliant with PAP machine and benefits from its use.  Apnea hypopneas index is corrected/improved.  Daytime hypersomnolence has resolved.     Patient will follow up in this clinic in 3 months  aprn    Thank you for allowing me to  participate in your patient's care.    Electronically signed by Freedom Garcia MD, 07/25/23, 10:53 AM EDT.    Part of this note may be an electronic transcription/translation of spoken language to printed text using the Dragon Dictation System.

## 2023-09-05 DIAGNOSIS — I10 ESSENTIAL HYPERTENSION: ICD-10-CM

## 2023-09-06 RX ORDER — SPIRONOLACTONE 25 MG/1
TABLET ORAL
Qty: 90 TABLET | Refills: 0 | Status: SHIPPED | OUTPATIENT
Start: 2023-09-06

## 2023-09-06 RX ORDER — LISINOPRIL 20 MG/1
TABLET ORAL
Qty: 90 TABLET | Refills: 0 | Status: SHIPPED | OUTPATIENT
Start: 2023-09-06

## 2023-09-06 RX ORDER — AMLODIPINE BESYLATE 10 MG/1
TABLET ORAL
Qty: 90 TABLET | Refills: 0 | Status: SHIPPED | OUTPATIENT
Start: 2023-09-06

## 2023-09-18 ENCOUNTER — OFFICE VISIT (OUTPATIENT)
Dept: FAMILY MEDICINE CLINIC | Facility: CLINIC | Age: 53
End: 2023-09-18
Payer: MEDICARE

## 2023-09-18 VITALS
HEIGHT: 69 IN | OXYGEN SATURATION: 98 % | WEIGHT: 251 LBS | RESPIRATION RATE: 16 BRPM | HEART RATE: 79 BPM | DIASTOLIC BLOOD PRESSURE: 80 MMHG | SYSTOLIC BLOOD PRESSURE: 122 MMHG | TEMPERATURE: 98.9 F | BODY MASS INDEX: 37.18 KG/M2

## 2023-09-18 DIAGNOSIS — I10 ESSENTIAL HYPERTENSION: ICD-10-CM

## 2023-09-18 DIAGNOSIS — R73.03 PREDIABETES: ICD-10-CM

## 2023-09-18 DIAGNOSIS — R47.01 EXPRESSIVE APHASIA: ICD-10-CM

## 2023-09-18 DIAGNOSIS — R42 DIZZINESS: ICD-10-CM

## 2023-09-18 DIAGNOSIS — G47.33 OBSTRUCTIVE SLEEP APNEA: ICD-10-CM

## 2023-09-18 DIAGNOSIS — R01.1 NEWLY RECOGNIZED HEART MURMUR: ICD-10-CM

## 2023-09-18 DIAGNOSIS — I77.810 ASCENDING AORTA DILATATION: ICD-10-CM

## 2023-09-18 DIAGNOSIS — R79.89 ELEVATED LIVER FUNCTION TESTS: ICD-10-CM

## 2023-09-18 DIAGNOSIS — I25.10 CORONARY ARTERY CALCIFICATION SEEN ON CT SCAN: ICD-10-CM

## 2023-09-18 DIAGNOSIS — E78.5 HYPERLIPIDEMIA LDL GOAL <70: ICD-10-CM

## 2023-09-18 DIAGNOSIS — E55.9 VITAMIN D DEFICIENCY: ICD-10-CM

## 2023-09-18 DIAGNOSIS — R56.9 NEW ONSET SEIZURE: ICD-10-CM

## 2023-09-18 DIAGNOSIS — I61.9 HEMORRHAGIC STROKE: ICD-10-CM

## 2023-09-18 DIAGNOSIS — Z00.00 MEDICARE ANNUAL WELLNESS VISIT, SUBSEQUENT: Primary | ICD-10-CM

## 2023-09-18 DIAGNOSIS — I77.810 DILATED AORTIC ROOT: ICD-10-CM

## 2023-09-18 DIAGNOSIS — R26.81 GAIT INSTABILITY: ICD-10-CM

## 2023-09-18 DIAGNOSIS — R74.8 ELEVATED CK: ICD-10-CM

## 2023-09-18 NOTE — PROGRESS NOTES
The ABCs of the Annual Wellness Visit  Subsequent Medicare Wellness Visit    Subjective      Gaurav Mora is a 53 y.o. male who presents for a Subsequent Medicare Wellness Visit.    Last DEXA- has not had  Last colonoscopy- Dr Paul- last appt 2/2022- recheck in 5 years.   Last Tdap- 3/2020.   Prevnar 20- 3/2023  Pneumovax- has not had  Hepatitis A- has not had  Last flu shot- 12/2022  Shingrix- had varicella as a child. Shingrix 9/2022, 11/2022  Covid 19- 3/2021, 4/2021, 11/2021, 6/2022      The following portions of the patient's history were reviewed and   updated as appropriate: allergies, current medications, past family history, past medical history, past social history, past surgical history, and problem list.    Compared to one year ago, the patient feels his physical   health is the same.    Compared to one year ago, the patient feels his mental   health is the same.    Recent Hospitalizations:  This patient has had a [facility] admission record on file within the last 365 days.    Current Medical Providers:  Patient Care Team:  Rebecca Nixon PA as PCP - General (Family Medicine)    Outpatient Medications Prior to Visit   Medication Sig Dispense Refill    acetaminophen (TYLENOL) 325 MG tablet Take 2 tablets by mouth Every 4 (Four) Hours As Needed for Mild Pain.      amLODIPine (NORVASC) 10 MG tablet TAKE 1 TABLET DAILY 90 tablet 0    aspirin 81 MG EC tablet Take 1 tablet by mouth Daily.      ipratropium (ATROVENT) 0.06 % nasal spray       labetalol (NORMODYNE) 100 MG tablet TAKE 1 TABLET THREE TIMES A  tablet 5    levocetirizine (XYZAL) 5 MG tablet Take 1 tablet by mouth Every Evening.      lisinopril (PRINIVIL,ZESTRIL) 20 MG tablet TAKE 1 TABLET DAILY 90 tablet 0    omeprazole (priLOSEC) 20 MG capsule Take 1 capsule by mouth Daily.      OXcarbazepine (TRILEPTAL) 600 MG tablet TAKE 1 TABLET TWICE A  tablet 3    pravastatin (PRAVACHOL) 40 MG tablet 1 tablet Daily. 90 tablet 3     spironolactone (ALDACTONE) 25 MG tablet TAKE 1 TABLET DAILY 90 tablet 0    vitamin D3 125 MCG (5000 UT) capsule capsule Take 1 capsule by mouth Daily.      cholecalciferol (VITAMIN D3) 25 MCG (1000 UT) tablet Take 1 tablet by mouth Daily.       No facility-administered medications prior to visit.       No opioid medication identified on active medication list. I have reviewed chart for other potential  high risk medication/s and harmful drug interactions in the elderly.        Aspirin is on active medication list. Aspirin use is indicated based on review of current medical condition/s. Pros and cons of this therapy have been discussed today. Benefits of this medication outweigh potential harm.  Patient has been encouraged to continue taking this medication.  .      Patient Active Problem List   Diagnosis    Essential hypertension    Alteration of sensations, post-stroke    Expressive aphasia    Obstructive sleep apnea    Hyperlipidemia LDL goal <70    Slow transit constipation    Hemorrhagic cerebrovascular accident (CVA)    Elevated CK    Acute encephalopathy    ICH (intracerebral hemorrhage)    IVH (intraventricular hemorrhage)    Acute kidney injury    Aspiration pneumonia    E. coli UTI (urinary tract infection)    Morbid obesity with BMI of 45.0-49.9, adult    Oropharyngeal dysphagia    Sleep-related breathing disorder    Urinary retention    Aphasia    Right homonymous hemianopsia    Colon cancer screening    New onset seizure    Dizziness    Aortic calcification    Systolic murmur    Prediabetes    Vitamin D deficiency    Elevated liver function tests     Advance Care Planning   Advance Care Planning     Advance Directive is on file.  ACP discussion was held with the patient during this visit. Patient has an advance directive in EMR which is still valid.      Objective    Vitals:    09/18/23 1024   BP: 122/80   Pulse: 79   Resp: 16   Temp: 98.9 °F (37.2 °C)   SpO2: 98%   Weight: 114 kg (251 lb)   Height:  "175.3 cm (69.02\")     Estimated body mass index is 37.05 kg/m² as calculated from the following:    Height as of this encounter: 175.3 cm (69.02\").    Weight as of this encounter: 114 kg (251 lb).    Class 2 Severe Obesity (BMI >=35 and <=39.9). Obesity-related health conditions include the following: hypertension and dyslipidemias. Obesity is unchanged. BMI is is above average; BMI management plan is completed. We discussed portion control and increasing exercise.      Does the patient have evidence of cognitive impairment?   No    Lab Results   Component Value Date    CHLPL 165 2023    TRIG 60 2023    HDL 46 2023     (H) 2023    VLDL 12 2023    HGBA1C 5.70 (H) 2023          HEALTH RISK ASSESSMENT    Smoking Status:  Social History     Tobacco Use   Smoking Status Never    Passive exposure: Never   Smokeless Tobacco Never   Tobacco Comments    minimal caffeine     Alcohol Consumption:  Social History     Substance and Sexual Activity   Alcohol Use Not Currently    Alcohol/week: 1.0 standard drink    Types: 1 Cans of beer per week    Comment: This is rare.     Fall Risk Screen:    STEADI Fall Risk Assessment has not been completed.    Depression Screenin/12/2023     9:56 AM   PHQ-2/PHQ-9 Depression Screening   Little Interest or Pleasure in Doing Things 0-->not at all   Feeling Down, Depressed or Hopeless 0-->not at all   PHQ-9: Brief Depression Severity Measure Score 0       Health Habits and Functional and Cognitive Screening:       No data to display                Age-appropriate Screening Schedule:  Refer to the list below for future screening recommendations based on patient's age, sex and/or medical conditions. Orders for these recommended tests are listed in the plan section. The patient has been provided with a written plan.    Health Maintenance   Topic Date Due    ANNUAL WELLNESS VISIT  Never done    INFLUENZA VACCINE  2023    COVID-19 Vaccine (5 - " 2023-24 season) 09/01/2023    LIPID PANEL  09/18/2024    BMI FOLLOWUP  09/18/2024    TDAP/TD VACCINES (2 - Td or Tdap) 03/15/2030    COLORECTAL CANCER SCREENING  02/17/2032    HEPATITIS C SCREENING  Completed    ZOSTER VACCINE  Completed    Pneumococcal Vaccine 0-64  Aged Out                  CMS Preventative Services Quick Reference  Risk Factors Identified During Encounter:    Immunizations Discussed/Encouraged: Influenza and COVID19  Polypharmacy: Medication List reviewed    The above risks/problems have been discussed with the patient.  Pertinent information has been shared with the patient in the After Visit Summary.    Diagnoses and all orders for this visit:    1. Medicare annual wellness visit, subsequent (Primary)    2. Essential hypertension  -     CBC & Differential  -     Comprehensive Metabolic Panel    3. Hyperlipidemia LDL goal <70  -     CBC & Differential  -     Comprehensive Metabolic Panel  -     CK  -     Lipid Panel With LDL / HDL Ratio    4. Elevated CK  -     CBC & Differential  -     CK    5. Prediabetes  -     CBC & Differential  -     Comprehensive Metabolic Panel  -     Hemoglobin A1c  -     TSH  -     T4, free  -     T3, Free    6. Vitamin D deficiency  -     CBC & Differential  -     Comprehensive Metabolic Panel  -     Vitamin D,25-Hydroxy    7. Elevated liver function tests  -     CBC & Differential  -     Comprehensive Metabolic Panel    8. Obstructive sleep apnea    9. Hemorrhagic stroke    10. Expressive aphasia    11. New onset seizure    12. Gait instability    13. Newly recognized heart murmur    14. Dilated aortic root    15. Ascending aorta dilatation    16. Coronary artery calcification seen on CT scan    17. Dizziness        Follow Up:   Next Medicare Wellness visit to be scheduled in 1 year.      An After Visit Summary and PPPS were made available to the patient.

## 2023-09-18 NOTE — PROGRESS NOTES
Subjective   Gaurav Mora is a 53 y.o. male  who presents today in follow up of hospitalization for dizziness and hypertension as well as routine follow-up of hypertension, hyperlipidemia, vitamin D deficiency, elevated CK, elevated LFT, SHANE, seizure disorder, history of intracranial hemorrhage.     Heart Murmur  Associated symptoms include weakness. Pertinent negatives include no arthralgias, fever or myalgias.   Hypertension    Dizziness, orthostatic hypotension-  Symptoms started with elevated /82 and had lay down - 123/72. Then he got up to go to the bathroom, he was dizzy and went to urgent care. It was about 4 hours into episode when he went to urgent care. He was sweating and clammy and having dizziness. With elevated BP, he was transported to ER by ambulance.   He was then seen at the emergency department 6/1/2023 with blood pressure 154/108 and complained of lightheadedness, dizziness, elevated blood pressure.  Advised to go to the ER.  She went to the emergency department where they noted sudden onset dizziness that started about 3 PM-dizziness characterized as off-balance feeling.  He denied spinning dizziness.  Symptoms were worse with head movement and walking, improved with rest.  ER labs-high-sensitivity troponin 33, delta troponin 9, creatinine 0.92, glucose 197, WBC 9.5, hemoglobin 14.6, platelets 237.  Chest x-ray negative for acute infiltration.  CT head without acute intracranial abnormality.  Symptoms were thought to be from peripheral source but cardiology and neurology were consulted.  He was admitted to observation.  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  Head CT unremarkable, MRI unremarkable, TTE unremarkable, orthostatic vital signs ordered and PT for Epley maneuver per Dr. Nithya Mesa.   Cardiology consultation-initial troponin was 33 with repeat increased to 41 then 40.  He denied chest pain and SOA.  They were  "concerned about hypertension due to history of stroke secondary to hypertension.  Dizziness was thought to be likely vertigo.  Nonspecific at bedtime troponin elevation.  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  At bedtime troponin was not considered to be significant-not thought to be type I or type II NSTEMI.  Asymptomatic without angina.  Light murmur on exam-echo without wall motion abnormality and normal EF.  Aortic valve was not ideally visualized although there was some calcification-thought to be the origination of the murmur-no aortic stenosis.  Did not feel dizziness was from cardiac source.  With blood pressure returning to normal in the spittle, blood pressure regimen was not changed.  Continue labetalol, lisinopril, amlodipine, and spironolactone.  Continue pravastatin and aspirin.  He was seen by cardiology in follow-up 6/9/2023.  They did not make changes to blood pressure or cholesterol regimen.  Aorta was noted to be stable.  Coronary calcification noted on CT and elevated troponin without anginal symptoms and already on appropriate medical therapy-no change to regimen.  Advised follow-up with Dr. Coates \"as scheduled\" in January 2024.  No appointment is scheduled.  Blood pressure has been ok at home since discharge. He continues to have some dizziness- he has no spinning dizziness and still having light-headedness intermittent. If sitting on couch, he may have while sitting. He also has sometimes when he changes positions. He started with positional dizziness about 1 year ago.   Overall, feeling a little better but still feeling \"off\".   Heart murmur, very small thoracic aortic aneurysm-  3/2023-he denied CP, SOA, syncope, palpitations. [facility] advised he follow up here for us to order Echo. They dad not contacted his cardiologist for this because " they were advised to follow up here.   He did have previous aortic root and ascending aortic dilitation.   I advised a follow-up with cardiology for consideration of echo and that I would order CTA chest and follow-up.  CTA chest with thoracic aorta 3.5 cm at sinus of Valsalva, 3.9 cm pulmonary artery, 2.4 cm aortic arch and 2.2 cm at the left atrium.  Coronary artery calcifications noted.  Right-sided gynecomastia.  I sent to his cardiologist-they advised they were following him very slight aneurysm.  Hypertension- patient has been stable on Norvasc 10 mg once daily, labetalol 100 mg twice daily, lisinopril 20 mg once daily, and spironolactone 25 mg once daily. BP at home- 130s/70s-80s.   Hyperlipidemia- not taking pravastatin and tolerating ok.   He had to stop statin due to significantly elevated CK as well as elevated LFT.    Had not been rechecked since 11/2019.   Talked to neurology- cardiology tried to send in Repatha and Praluent- not approved. Neurology is still recommending and will talk with cardiology to see if they can work together to get approved.   Prediabetes-   Vitamin D deficiency- taking 5000 IU daily.   Previously- vitamin D 50,000 IU once weekly.  Elevated CK- following with neurology and rheumatology.   Elevated LFT- normal since 2019    SHANE- continues CPAP and follow up with sleep medicine.   History of hemorrhagic CVA 11/2017- he has been stable without new deficits or any new concerns. Following with neurology.   He previously had itching in groin area and sometimes redness and flaking x months intermittent.  Seizure- no seizures while on medication.   Patient was hospitalized 4/28/2022-4/29/2022 for witnessed tonic clonic seizure with post-ictal state. Neurology was consulted and he was recommended to Keppra with MRI brain and EEG. Neurology thought seizure was secondary to temporal lobe hemorrhagic CVA.   CT head with temporal horm left > right.   MRI brain- 4/28/2022 with extensive  atrophy and hemosiderin deposition left temporal lobe from previous hemorrhage. Hyperintensity left cerebral hemisphere- thought to be from bleed.  EEG- abnormal- intermittent left temporal slowing and reversals concerning for left temporal spike and polyspike complexes. Mild diffuse slowing from mild encephalopathy. Focal cortical irritability- epileptogenic potential- suggestive but not diagnostic of seizure d/o. 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       Patient's Specialists:  Cardiology- Dr. Bradford- last appt 6/2022 for hypertension, hyperlipidemia, dilated ascending aorta, and history of hemorrhagic CVA, and elevated CK. They tried to get Praluent and Repatha approved without success. Repeat scan for pulmonary nodule 3/2021- Aorta 3.8. Advised he would check again in the future. Started Pravastatin 20 mg daily and advised recheck LFT, CK, and lipids in 1 month. Follow up in 6 months.   General surgery- Dr Paulina Nina- last appt 8/2020 for muscle biopsy with elevated CK. Abnormal. Advised return to rheumatology  Nephrology- PENNY Ambrocio- last appt 6/2018 for htn, his hemorrhagic CVA, SHANE, right homonymous hemianopsia.    Neurology- Dr Garner-  Last appt 10/2022 for seizures- partial with secondary generalization and complex partial seizures, sequelae of left putaminal hemorrhage, stable without new symptoms, hypertension, hyperlipidemia, elevated CK and ALT. Keppra 1000 mg BID with side effects. Continue oxcarbazepine 600 mg BID. He previously advised patient may consider Repatha if he cannot tolerate statin or Zetia but limited effect, continue ASA 81 mg once daily and antihypertensives. 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 No symptoms. No further workup needed for CK. If he developed proximal weakness, pursue an EMG. Follow up in 6 months.   Ophthalmology- Dr Armando-  last appt 4/2018 following hemorrhagic CVA with chronic dissection left vertebral artery with complete right homonymous hemianopsia.   PM&R- Connie Haase, APRN- last appt 6/2018 with hemorrhagic CVA, homonymous hemiopsia, and aphasia.   Sleep medicine- Dr Garcia- last appt 6/2022 for SHANE. Compliant with CPAP. Stable. Follow up in 1 year.   Thoracic surgery- Destiney Monteiro APRN- last appt 3/2021 for pulmonary nodule. Last CT chest 3/2021 for pulmonary nodule- stable. Advised benign granulomatous disease. No need for follow up. Advised constant throat clearing- thought could be related to Lisinopril and to discuss with PCP. Follow up PRN.    Pulmonology- Dr Garcia- last appt 11/22/2019 for SHANE and pulmonary nodule. Stable and follow up with thoracic surgery as directed. Follow up with pulmonology in 1 year.   Rheumatology- Dr Laura Garner- last appt 9/2022 for elevated CK- muscle biopsy -type 1 fiber hypertrophy. Negative Aldolase- no signs or abnormalities for myositis, MRI negative for myositis. Advised following with neurology for monitoring annually due to likely neuromuscular etiology. Asymptomatic. Follow up PRN.   Urology- Dr. Marie- last appt 10/2019 for microhematuria- CT abd/pelvis and cystoscopy was negative. Follow up in 1 year.    The following portions of the patient's history were reviewed and updated as appropriate: allergies, current medications, past family history, past medical history, past social history, past surgical history and problem list.    Review of Systems   Constitutional:  Negative for fever.   HENT: Negative.     Eyes:  Positive for visual disturbance.   Respiratory: Negative.     Cardiovascular: Negative.    Gastrointestinal: Negative.    Genitourinary: Negative.    Musculoskeletal:  Negative for arthralgias and myalgias.   Skin: Negative.    Neurological:  Positive for seizures (none since medication), speech difficulty and weakness.   Psychiatric/Behavioral: Negative.        Objective      Vitals:    09/18/23 1024   BP: 122/80   Pulse: 79   Resp: 16   Temp: 98.9 °F (37.2 °C)   SpO2: 98%     Body mass index is 37.05 kg/m².    Physical Exam   Constitutional: He is oriented to person, place, and time. He appears well-developed. No distress.   HENT:   Head: Normocephalic and atraumatic.   Right Ear: External ear normal.   Left Ear: External ear normal.   Eyes: Conjunctivae are normal.   Neck: Carotid bruit is not present. No tracheal deviation present. No thyroid mass and no thyromegaly present.   Cardiovascular: Normal rate, regular rhythm and normal pulses.   Murmur heard.  Pulmonary/Chest: Effort normal and breath sounds normal.   Abdominal: Normal appearance.   Neurological: He is alert and oriented to person, place, and time. Gait normal.   Skin: Skin is warm and dry.   Psychiatric: His behavior is normal. Mood, affect and thought content normal.   Nursing note and vitals reviewed.      Assessment & Plan   Diagnoses and all orders for this visit:    1. Medicare annual wellness visit, subsequent (Primary)    2. Essential hypertension  -     CBC & Differential  -     Comprehensive Metabolic Panel    3. Hyperlipidemia LDL goal <70  -     CBC & Differential  -     Comprehensive Metabolic Panel  -     CK  -     Lipid Panel With LDL / HDL Ratio    4. Elevated CK  -     CBC & Differential  -     CK    5. Prediabetes  -     CBC & Differential  -     Comprehensive Metabolic Panel  -     Hemoglobin A1c  -     TSH  -     T4, free  -     T3, Free    6. Vitamin D deficiency  -     CBC & Differential  -     Comprehensive Metabolic Panel  -     Vitamin D,25-Hydroxy    7. Elevated liver function tests  -     CBC & Differential  -     Comprehensive Metabolic Panel    8. Obstructive sleep apnea    9. Hemorrhagic stroke    10. Expressive aphasia    11. New onset seizure    12. Gait instability    13. Newly recognized heart murmur    14. Dilated aortic root    15. Ascending aorta dilatation    16.  Coronary artery calcification seen on CT scan    17. Dizziness      Assessment and Plan  AWV completed today.  He is up-to-date on colonoscopy.  He should ensure COVID-19 booster and annual flu shot.  With seizure treatment, we could consider DEXA in the future.    Patient will have fasting labs. Call if no results in 1 week. Stability of conditions, plan, follow up, and further recommendations pending labs.  If stable, follow-up in 6 months, fasting.    Aortic root and ascending aorta dilation, heart murmur- Patient to continue follow-up with cardiology as directed by them.   Hypertension, orthostatic hypotension- Blood pressure today is stable today. Continue Lisinopril 20 mg once daily, amlodipine 10 mg once daily, Labetalol 100 mg 3 times daily, and spironolactone 25 mg once daily. Monitor BP and call if any elevated BP > 135/85 or any recurrence of dizziness or hypotension.   Hyperlipidemia- Patient had to stop his statin due to significantly elevated CK and LFT.  Cardiology and neurology have seen patient and agreed that he may not be a candidate for statin at this point. Cardiology has tried to get approval for Praluent and Repatha without success. He never experienced any muscle pain or weakness or felt any changes with stopping medication. He has been seen by rheumatology and discharged to PRN follow up. To follow annually with neurology for CK. Continue Pravastatin 20 mg once daily per cardiology. Await labs for further recommendations.    Vitamin D deficiency- Continue vitamin D 5000 IU daily. Dosing recommendations pending labs.   Elevated CK- From review of rheumatology records, neurology may be best to continue to monitor, follow up, and determine any further workup or treatment in the future.   Elevated liver function tests- Liver function testing was briefly elevated and returned to normal 2019. I will continue to monitor.   SHANE- Continue CPAP and follow up with sleep medicine as directed by them.    History of hemorrhagic CVA- He had a significant hemorrhagic CVA due to uncontrolled BP with residual deficits in 11/2017. His baseline prior to CVA is unknown to me, as I did not know him prior to CVA. He is doing well without CP, SOA, Palp, neuro symptoms, or other cardiovascular or cerebrovascular complaints. However, he has stiffness and some decreased physical ability. We will try physical therapy.   Seizure disorder- Patient has developed seizures that have been attributed to likely scar tissue or damage from previous hemorrhagic CVA. Continue medication and follow up per neurology.  Tinea cruris- He had itching in groin area and sometimes redness and flaking x months intermittent. He was given Lotrimin cream and powder for likely tinea cruris. To call or return if worsening, no improvement, new or changing symptoms.      Patient continues to see specialists as noted above.  I have reviewed available records, including consult notes, labs, and imaging/testing.  Patient to continue follow-up with specialists as directed by them.     I spent 45 minutes caring for Gaurav Mora on this date of service, including 15 minutes for AWV and 30 minutes for follow-up and problem focused visit. This time includes time spent by me in the following activities as necessary: preparing for the visit, reviewing tests, specialists records and previous visits, obtaining and/or reviewing a separately obtained history, performing a medically appropriate exam and/or evaluation, counseling and educating the patient, family, caregiver, referring and/or communicating with other healthcare professionals, documenting information in the medical record, independently interpreting results and communicating that information with the patient, family, caregiver, and developing a medically appropriate treatment plan with consideration of other conditions, medications, and treatments.

## 2023-09-19 LAB
25(OH)D3+25(OH)D2 SERPL-MCNC: 38.7 NG/ML (ref 30–100)
ALBUMIN SERPL-MCNC: 4.8 G/DL (ref 3.5–5.2)
ALBUMIN/GLOB SERPL: 2.1 G/DL
ALP SERPL-CCNC: 73 U/L (ref 39–117)
ALT SERPL-CCNC: 21 U/L (ref 1–41)
AST SERPL-CCNC: 25 U/L (ref 1–40)
BASOPHILS # BLD AUTO: 0.03 10*3/MM3 (ref 0–0.2)
BASOPHILS NFR BLD AUTO: 0.6 % (ref 0–1.5)
BILIRUB SERPL-MCNC: 0.5 MG/DL (ref 0–1.2)
BUN SERPL-MCNC: 10 MG/DL (ref 6–20)
BUN/CREAT SERPL: 12.2 (ref 7–25)
CALCIUM SERPL-MCNC: 9.4 MG/DL (ref 8.6–10.5)
CHLORIDE SERPL-SCNC: 99 MMOL/L (ref 98–107)
CHOLEST SERPL-MCNC: 165 MG/DL (ref 0–200)
CK SERPL-CCNC: 936 U/L (ref 20–200)
CO2 SERPL-SCNC: 24.1 MMOL/L (ref 22–29)
CREAT SERPL-MCNC: 0.82 MG/DL (ref 0.76–1.27)
EGFRCR SERPLBLD CKD-EPI 2021: 106.4 ML/MIN/1.73
EOSINOPHIL # BLD AUTO: 0.07 10*3/MM3 (ref 0–0.4)
EOSINOPHIL NFR BLD AUTO: 1.4 % (ref 0.3–6.2)
ERYTHROCYTE [DISTWIDTH] IN BLOOD BY AUTOMATED COUNT: 12.5 % (ref 12.3–15.4)
GLOBULIN SER CALC-MCNC: 2.3 GM/DL
GLUCOSE SERPL-MCNC: 103 MG/DL (ref 65–99)
HBA1C MFR BLD: 5.7 % (ref 4.8–5.6)
HCT VFR BLD AUTO: 42.9 % (ref 37.5–51)
HDLC SERPL-MCNC: 46 MG/DL (ref 40–60)
HGB BLD-MCNC: 14.6 G/DL (ref 13–17.7)
IMM GRANULOCYTES # BLD AUTO: 0.01 10*3/MM3 (ref 0–0.05)
IMM GRANULOCYTES NFR BLD AUTO: 0.2 % (ref 0–0.5)
LDLC SERPL CALC-MCNC: 107 MG/DL (ref 0–100)
LDLC/HDLC SERPL: 2.33 {RATIO}
LYMPHOCYTES # BLD AUTO: 1.64 10*3/MM3 (ref 0.7–3.1)
LYMPHOCYTES NFR BLD AUTO: 31.8 % (ref 19.6–45.3)
MCH RBC QN AUTO: 27.7 PG (ref 26.6–33)
MCHC RBC AUTO-ENTMCNC: 34 G/DL (ref 31.5–35.7)
MCV RBC AUTO: 81.3 FL (ref 79–97)
MONOCYTES # BLD AUTO: 0.48 10*3/MM3 (ref 0.1–0.9)
MONOCYTES NFR BLD AUTO: 9.3 % (ref 5–12)
NEUTROPHILS # BLD AUTO: 2.93 10*3/MM3 (ref 1.7–7)
NEUTROPHILS NFR BLD AUTO: 56.7 % (ref 42.7–76)
NRBC BLD AUTO-RTO: 0 /100 WBC (ref 0–0.2)
PLATELET # BLD AUTO: 243 10*3/MM3 (ref 140–450)
POTASSIUM SERPL-SCNC: 4.4 MMOL/L (ref 3.5–5.2)
PROT SERPL-MCNC: 7.1 G/DL (ref 6–8.5)
RBC # BLD AUTO: 5.28 10*6/MM3 (ref 4.14–5.8)
SODIUM SERPL-SCNC: 135 MMOL/L (ref 136–145)
T3FREE SERPL-MCNC: 2.7 PG/ML (ref 2–4.4)
T4 FREE SERPL-MCNC: 0.97 NG/DL (ref 0.93–1.7)
TRIGL SERPL-MCNC: 60 MG/DL (ref 0–150)
TSH SERPL DL<=0.005 MIU/L-ACNC: 1.1 UIU/ML (ref 0.27–4.2)
VLDLC SERPL CALC-MCNC: 12 MG/DL (ref 5–40)
WBC # BLD AUTO: 5.16 10*3/MM3 (ref 3.4–10.8)

## 2023-11-03 ENCOUNTER — OFFICE VISIT (OUTPATIENT)
Dept: SLEEP MEDICINE | Facility: HOSPITAL | Age: 53
End: 2023-11-03
Payer: MEDICARE

## 2023-11-03 VITALS
SYSTOLIC BLOOD PRESSURE: 123 MMHG | HEIGHT: 69 IN | DIASTOLIC BLOOD PRESSURE: 77 MMHG | HEART RATE: 77 BPM | BODY MASS INDEX: 37.18 KG/M2 | OXYGEN SATURATION: 97 % | WEIGHT: 251 LBS

## 2023-11-03 DIAGNOSIS — G47.33 OBSTRUCTIVE SLEEP APNEA: Primary | ICD-10-CM

## 2023-11-03 DIAGNOSIS — Z86.73 HISTORY OF STROKE: ICD-10-CM

## 2023-11-03 DIAGNOSIS — E66.9 OBESITY (BMI 30-39.9): ICD-10-CM

## 2023-11-03 PROCEDURE — G0463 HOSPITAL OUTPT CLINIC VISIT: HCPCS

## 2023-11-03 NOTE — PROGRESS NOTES
[facility]  Telephone: [phone] / Fax: [phone] [place]  Telephone: [phone] / Fax: [phone] Tasha Foster    PCP: Rebecca Nixon PA    Reason for visit: SHANE f/u    Gaurav Mora is a 53 y.o.male  was last seen at [facility] sleep lab in July 2023. He got a new machine shortly after last visit and is able to use it nightly. He is here today to review response to new machine. Machine is set at 8-12cm H2O. He has a full face mask. It fits well. He is unaware of significant leaks or dry mouth. His bedtime is 11pm-8am. ESS is 3.   CPAP controls the snoring/apneas. Sleep quality is better and he benefits from its use.    SH- disabled. He had a stroke in 2017 with sensation loss on the R side.    DME [employer].    ROS+nasal congestion.    Current Medications:    Current Outpatient Medications:     acetaminophen (TYLENOL) 325 MG tablet, Take 2 tablets by mouth Every 4 (Four) Hours As Needed for Mild Pain., Disp: , Rfl:     amLODIPine (NORVASC) 10 MG tablet, TAKE 1 TABLET DAILY, Disp: 90 tablet, Rfl: 0    aspirin 81 MG EC tablet, Take 1 tablet by mouth Daily., Disp: , Rfl:     ipratropium (ATROVENT) 0.06 % nasal spray, , Disp: , Rfl:     labetalol (NORMODYNE) 100 MG tablet, TAKE 1 TABLET THREE TIMES A DAY, Disp: 180 tablet, Rfl: 5    levocetirizine (XYZAL) 5 MG tablet, Take 1 tablet by mouth Every Evening., Disp: , Rfl:     lisinopril (PRINIVIL,ZESTRIL) 20 MG tablet, TAKE 1 TABLET DAILY, Disp: 90 tablet, Rfl: 0    omeprazole (priLOSEC) 20 MG capsule, Take 1 capsule by mouth Daily., Disp: , Rfl:     OXcarbazepine (TRILEPTAL) 600 MG tablet, TAKE 1 TABLET TWICE A DAY, Disp: 180 tablet, Rfl: 3    pravastatin (PRAVACHOL) 40 MG tablet, 1 tablet Daily., Disp: 90 tablet, Rfl: 3    spironolactone (ALDACTONE) 25 MG tablet, TAKE 1 TABLET DAILY, Disp: 90 tablet, Rfl: 0    vitamin D3 125 MCG (5000 UT) capsule capsule, Take 1 capsule by mouth Daily., Disp: , Rfl:    also entered in Sleep  "Questionnaire    Patient  has a past medical history of Elevated CK, Environmental allergies, Headache, tension-type, Heart murmur (3/15/23), Hemorrhagic cerebrovascular accident (CVA), Hemorrhagic stroke (11/24/2017), History of bladder infections (12/2017), Hyperlipidemia, Hypertension, Lung nodule, New onset seizure (04/28/2022), SHANE on CPAP, PONV (postoperative nausea and vomiting), Seizure (04/28/2022), and Stroke (2017).    I have reviewed the Past Medical History, Past Surgical History, Social History and Family History.    Vital Signs /77   Pulse 77   Ht 175.3 cm (69.02\")   Wt 114 kg (251 lb)   SpO2 97%   BMI 37.04 kg/m²  Body mass index is 37.04 kg/m².    General Alert and oriented. No acute distress noted   Pharynx/Throat Class IV  Mallampati airway, large tongue, no evidence of redundant lateral pharyngeal tissue. No oral lesions. No thrush. Moist mucous membranes.   Head Normocephalic. Symmetrical. Atraumatic.    Nose No septal deviation. No drainage   Chest Wall Normal shape. Symmetric expansion with respiration. No tenderness.   Neck Trachea midline, no thyromegaly or adenopathy    Lungs Clear to auscultation bilaterally. No wheezes. No rhonchi. No rales. Respirations regular, even and unlabored.   Heart Regular rhythm and normal rate. Normal S1 and S2. No murmur   Abdomen Soft, non-tender and non-distended. Normal bowel sounds. No masses.   Extremities Moves all extremities well. No edema   Psychiatric Normal mood and affect.     Testing:  Download 8/4/23-11/1/23 100% use with average nightly use of 9 hours and 16 minutes on auto CPAP 8-12cm H2O, avg pr is 10cm H2O, AHI 0.9/hr, leak is 12.7 L.min.    Study-July2023- Diagnostic data available to date is as below and was reviewed on current visit:  Outside sleep study at [facility] sleep [place] in [facility], 3/19/2018: AHI index 14.3 titrated up to 11 cm water pressure    Impression:  1. Obstructive sleep apnea    2. Obesity (BMI " 30-39.9)    3. History of stroke          Plan:  Gaurav is doing well on the new CPAP machine. He finds the pressures comfortable. I asked him to continue auto CPAP 8-12cm H2O.  I reviewed download report and original sleep study report with the patient. I advised pt to contact us if PAP pressures feel excessive or insufficient. Weight loss will be strongly beneficial to reduce the severity of sleep-disordered breathing.       Follow up with Dr. Garcia in one year    Thank you for allowing me to participate in your patient's care.      JUAN DANIEL Yeboah  [facility]  Phone: [phone]      Part of this note may be an electronic transcription/translation of spoken language to printed text using the Dragon Dictation System.

## 2023-11-29 DIAGNOSIS — I10 ESSENTIAL HYPERTENSION: ICD-10-CM

## 2023-11-29 RX ORDER — LISINOPRIL 20 MG/1
TABLET ORAL
Qty: 90 TABLET | Refills: 1 | Status: SHIPPED | OUTPATIENT
Start: 2023-11-29

## 2023-11-29 RX ORDER — SPIRONOLACTONE 25 MG/1
TABLET ORAL
Qty: 90 TABLET | Refills: 1 | Status: SHIPPED | OUTPATIENT
Start: 2023-11-29

## 2023-12-19 DIAGNOSIS — I10 ESSENTIAL HYPERTENSION: ICD-10-CM

## 2023-12-20 RX ORDER — AMLODIPINE BESYLATE 10 MG/1
TABLET ORAL
Qty: 90 TABLET | Refills: 1 | Status: SHIPPED | OUTPATIENT
Start: 2023-12-20

## 2024-01-22 ENCOUNTER — OFFICE VISIT (OUTPATIENT)
Dept: NEUROLOGY | Facility: CLINIC | Age: 54
End: 2024-01-22
Payer: MEDICARE

## 2024-01-22 VITALS
HEART RATE: 59 BPM | SYSTOLIC BLOOD PRESSURE: 138 MMHG | DIASTOLIC BLOOD PRESSURE: 104 MMHG | WEIGHT: 251 LBS | HEIGHT: 69 IN | BODY MASS INDEX: 37.18 KG/M2 | OXYGEN SATURATION: 97 %

## 2024-01-22 DIAGNOSIS — I69.398 SEIZURE DISORDER AS SEQUELA OF CEREBROVASCULAR ACCIDENT: ICD-10-CM

## 2024-01-22 DIAGNOSIS — R74.8 ELEVATED CK: ICD-10-CM

## 2024-01-22 DIAGNOSIS — I69.30 SEQUELAE, POST-STROKE: Primary | ICD-10-CM

## 2024-01-22 DIAGNOSIS — G40.909 SEIZURE DISORDER AS SEQUELA OF CEREBROVASCULAR ACCIDENT: ICD-10-CM

## 2024-01-22 PROCEDURE — 1160F RVW MEDS BY RX/DR IN RCRD: CPT | Performed by: PSYCHIATRY & NEUROLOGY

## 2024-01-22 PROCEDURE — 1159F MED LIST DOCD IN RCRD: CPT | Performed by: PSYCHIATRY & NEUROLOGY

## 2024-01-22 PROCEDURE — 99214 OFFICE O/P EST MOD 30 MIN: CPT | Performed by: PSYCHIATRY & NEUROLOGY

## 2024-01-22 PROCEDURE — 3080F DIAST BP >= 90 MM HG: CPT | Performed by: PSYCHIATRY & NEUROLOGY

## 2024-01-22 PROCEDURE — 3075F SYST BP GE 130 - 139MM HG: CPT | Performed by: PSYCHIATRY & NEUROLOGY

## 2024-01-22 NOTE — PROGRESS NOTES
CC: Stroke sequelae; complex partial seizures; myositis    HPI:  Gaurav Mora is a  53 y.o.  right-handed white male who I am seeing in follow-up with history of hemorrhagic stroke and subsequent complex partial seizures as well as elevated CK.  I saw the patient last 4/25/2023 with the following history taken from that note with additions/modifications as indicated:    The patient had a hemorrhagic stroke with right-sided weakness and aphasia.  He has risk factors of hypertension and hyperlipidemia.  He is accompanied by his father as his usual.  He has complex partial seizures and he was treated with Keppra but developed some behavioral changes with agitation and he was switched to oxcarbazepine at 600 mg twice daily and has had no recurrent seizures and behaviorally he has done well.  Most recent labs 9/18/2023 showed sodium 135 with normal AST 25 and ALT 21.  CBC was completely normal.  Total cholesterol 165 with HDL 46 and  on pravastatin 40 mg daily.  Triglycerides were 60.  Efforts to get him on Repatha were unsuccessful.     Patient has an elevated CK which has ranged generally from 500-900's.  It does not seem to make much difference if he is on a statin or not.  He was on rosuvastatin which was stopped for a year and then placed on pravastatin.  He does not have any complaints of weakness or muscle pain or cramping.  He had been seen by Dr. Jessie Garner of rheumatology and had a muscle biopsy which was negative for an inflammatory myopathy however it was still an abnormal study showing type II fiber atrophy with hypertrophic fibers with internalized nuclei.  The enzyme testing demonstrated no abnormalities and there was no excessive accumulation of glycogen.  No ragged red fibers.     A myositis panel was apparently obtained but I could not find the results.  A CT angio of the chest to evaluate his aorta further demonstrated upper limit of normal size of the aorta.    The patient was  hospitalized 6/2023 and seen by Dr. Mesa of neurology and he had an MRI of the brain and duplex carotid study..  He was seen for dizziness of vertiginous type thought to be inner ear.  MRI of the brain showed no acute changes and the duplex carotid study showed less than 50% carotid stenosis and the vertebral flow was antegrade bilaterally.              Past Medical History:   Diagnosis Date    Elevated CK     Environmental allergies     Headache, tension-type     Heart murmur 3/15/23    Hemorrhagic cerebrovascular accident (CVA)     Hemorrhagic stroke 11/24/2017    Residual peripheral vision loss, right-sided sensation loss    History of bladder infections 12/2017    Hyperlipidemia     Hypertension     Lung nodule     New onset seizure 04/28/2022    SHANE on CPAP     WEARS CPAP    PONV (postoperative nausea and vomiting)     Seizure 04/28/2022    Stroke 2017         Past Surgical History:   Procedure Laterality Date    APPENDECTOMY N/A 1987    COLONOSCOPY N/A 02/17/2022    Procedure: COLONOSCOPY into  cecum with cold bx polypectomy;  Surgeon: Jeronimo Paul Jr., MD;  Location: [facility] ENDOSCOPY;  Service: General;  Laterality: N/A;  pre: screen  post: polyp     MUSCLE BIOPSY Left 08/19/2020    Procedure: quadriceps muscle biopsy;  Surgeon: Paulina Nina MD;  Location: [facility] MAIN OR;  Service: General;  Laterality: Left;           Current Outpatient Medications:     acetaminophen (TYLENOL) 325 MG tablet, Take 2 tablets by mouth Every 4 (Four) Hours As Needed for Mild Pain., Disp: , Rfl:     amLODIPine (NORVASC) 10 MG tablet, TAKE 1 TABLET DAILY, Disp: 90 tablet, Rfl: 1    aspirin 81 MG EC tablet, Take 1 tablet by mouth Daily., Disp: , Rfl:     ipratropium (ATROVENT) 0.06 % nasal spray, , Disp: , Rfl:     labetalol (NORMODYNE) 100 MG tablet, TAKE 1 TABLET THREE TIMES A DAY, Disp: 180 tablet, Rfl: 5    levocetirizine (XYZAL) 5 MG tablet, Take 1 tablet by mouth Every Evening., Disp: , Rfl:     lisinopril  "(PRINIVIL,ZESTRIL) 20 MG tablet, TAKE 1 TABLET DAILY, Disp: 90 tablet, Rfl: 1    omeprazole (priLOSEC) 20 MG capsule, Take 1 capsule by mouth Daily., Disp: , Rfl:     OXcarbazepine (TRILEPTAL) 600 MG tablet, TAKE 1 TABLET TWICE A DAY, Disp: 180 tablet, Rfl: 3    pravastatin (PRAVACHOL) 40 MG tablet, 1 tablet Daily., Disp: 90 tablet, Rfl: 3    spironolactone (ALDACTONE) 25 MG tablet, TAKE 1 TABLET DAILY, Disp: 90 tablet, Rfl: 1    vitamin D3 125 MCG (5000 UT) capsule capsule, Take 1 capsule by mouth Daily., Disp: , Rfl:       Family History   Problem Relation Age of Onset    Breast cancer Mother     Atrial fibrillation Father     Hypertension Father     Skin cancer Father     Colon cancer Paternal Grandmother     Heart disease Maternal Grandfather     Parkinsonism Maternal Grandfather     Heart disease Maternal Aunt     Heart disease Maternal Uncle     Malig Hyperthermia Neg Hx          Social History     Socioeconomic History    Marital status: Single    Number of children: 0   Tobacco Use    Smoking status: Never     Passive exposure: Never    Smokeless tobacco: Never    Tobacco comments:     minimal caffeine   Vaping Use    Vaping Use: Never used   Substance and Sexual Activity    Alcohol use: Not Currently     Alcohol/week: 1.0 standard drink of alcohol     Types: 1 Cans of beer per week     Comment: This is rare.    Drug use: Never    Sexual activity: Never         No Known Allergies      Pain Scale: 0/10        ROS:  Review of Systems   Neurological:  Positive for seizures. Negative for dizziness, tremors, syncope, facial asymmetry, speech difficulty, weakness, light-headedness, numbness and headaches.   Psychiatric/Behavioral: Negative.           I have reviewed and agree with the above ROS completed by the medical assistant.      Physical Exam:  Vitals:    01/22/24 1327   BP: (!) 138/104   Pulse: 59   SpO2: 97%   Weight: 114 kg (251 lb)   Height: 175.3 cm (69.02\")     Recheck blood pressure 130/92 in the left " arm    Body mass index is 37.04 kg/m².    Physical Exam  General: Obese white male no acute distress  HEENT: Normocephalic no evidence of trauma  Neck: Supple no thyromegaly.  No cervical bruits  Heart: Regular rate and rhythm no murmurs  Extremities: No pedal edema      Neurological Exam:   Mental Status: Awake, alert, oriented to person, place and time.  Conversant without evidence of an affective disorder, thought disorder, delusions or hallucinations.  Attention span and concentration are normal.  HCF: No aphasia, apraxia or dysarthria.  Recent and remote memory intact.  Knowledge  of recent events intact.  CN: I:   II: Visual fields full without left inattention   III, IV, VI: Eye movements intact without nystagmus or ptosis.  Pupils equal round and reactive to light.   V,VII: Light touch and pinprick intact all 3 divisions of V.  Facial muscles symmetrical.   VIII: Hearing intact to finger rub   IX,X: Soft palate elevates symmetrically   XI: Sternomastoid and trapezius are strong.   XII: Tongue midline without atrophy or fasciculations  Motor: Normal tone and bulk in the upper and lower extremities   Power testing: Mild intrinsic hand weakness on the right and mild right hip flexion and ankle dorsiflexion weakness.  Reflexes: Upper extremities: Trace        Lower extremities: Trace        Toe signs:  Sensory: Light touch:        Pinprick:        Vibration:        Position:    Cerebellar: Finger-to-nose: A little slower on the right           Rapid movement: A little slower on the right           Heel-to-shin:  Gait and Station: Mildly broad-based slightly spastic appearing with the right leg    Results:      Lab Results   Component Value Date    GLUCOSE 103 (H) 09/18/2023    BUN 10 09/18/2023    CREATININE 0.82 09/18/2023    EGFRIFNONA 88 12/21/2021    EGFRIFAFRI 102 12/21/2021    BCR 12.2 09/18/2023    CO2 24.1 09/18/2023    CALCIUM 9.4 09/18/2023    PROTENTOTREF 7.1 09/18/2023    ALBUMIN 4.8 09/18/2023     "LABIL2 2.1 09/18/2023    AST 25 09/18/2023    ALT 21 09/18/2023       Lab Results   Component Value Date    WBC 5.16 09/18/2023    HGB 14.6 09/18/2023    HCT 42.9 09/18/2023    MCV 81.3 09/18/2023     09/18/2023         .No results found for: \"RPR\"      Lab Results   Component Value Date    TSH 1.100 09/18/2023         No results found for: \"[id number]\"      No results found for: \"FOLATE\"      Lab Results   Component Value Date    HGBA1C 5.70 (H) 09/18/2023         Lab Results   Component Value Date    GLUCOSE 103 (H) 09/18/2023    BUN 10 09/18/2023    CREATININE 0.82 09/18/2023    EGFRIFNONA 88 12/21/2021    EGFRIFAFRI 102 12/21/2021    BCR 12.2 09/18/2023    K 4.4 09/18/2023    CO2 24.1 09/18/2023    CALCIUM 9.4 09/18/2023    PROTENTOTREF 7.1 09/18/2023    ALBUMIN 4.8 09/18/2023    LABIL2 2.1 09/18/2023    AST 25 09/18/2023    ALT 21 09/18/2023         Lab Results   Component Value Date    WBC 5.16 09/18/2023    HGB 14.6 09/18/2023    HCT 42.9 09/18/2023    MCV 81.3 09/18/2023     09/18/2023             Assessment:   1.  Stroke sequelae-no new TIA or strokelike symptoms.  He has some residual right-sided weakness which is rather mild  2.  Complex partial seizures-no recurrence on oxcarbazepine 600 mg twice daily  3.  Elevated CPK-no proximal weakness identified on the left side      Plan:  1.  Will attempt to get the myositis panel from Dr. Jessie Garner's office.  2.  Continue risk factor control.  His lipids may be improved by Zetia.  I asked him to discuss this further with his primary care.  We are unable to get him on Repatha  3.  To follow-up with Nilda Ngo NP in about 6 months        Time: 30 minutes              Dictated utilizing Dragon dictation.   "

## 2024-02-02 RX ORDER — LABETALOL 100 MG/1
TABLET, FILM COATED ORAL
Qty: 180 TABLET | Refills: 1 | Status: SHIPPED | OUTPATIENT
Start: 2024-02-02

## 2024-02-19 ENCOUNTER — TELEPHONE (OUTPATIENT)
Dept: NEUROLOGY | Facility: CLINIC | Age: 54
End: 2024-02-19
Payer: MEDICARE

## 2024-03-18 ENCOUNTER — OFFICE VISIT (OUTPATIENT)
Dept: FAMILY MEDICINE CLINIC | Facility: CLINIC | Age: 54
End: 2024-03-18
Payer: MEDICARE

## 2024-03-18 VITALS
HEART RATE: 69 BPM | TEMPERATURE: 97.9 F | OXYGEN SATURATION: 99 % | WEIGHT: 249 LBS | HEIGHT: 69 IN | SYSTOLIC BLOOD PRESSURE: 108 MMHG | RESPIRATION RATE: 16 BRPM | BODY MASS INDEX: 36.88 KG/M2 | DIASTOLIC BLOOD PRESSURE: 68 MMHG

## 2024-03-18 DIAGNOSIS — I10 ESSENTIAL HYPERTENSION: Primary | ICD-10-CM

## 2024-03-18 DIAGNOSIS — I77.810 DILATED AORTIC ROOT: ICD-10-CM

## 2024-03-18 DIAGNOSIS — R74.8 ELEVATED CK: ICD-10-CM

## 2024-03-18 DIAGNOSIS — I77.810 ASCENDING AORTA DILATATION: ICD-10-CM

## 2024-03-18 DIAGNOSIS — R26.81 GAIT INSTABILITY: ICD-10-CM

## 2024-03-18 DIAGNOSIS — R73.03 PREDIABETES: ICD-10-CM

## 2024-03-18 DIAGNOSIS — G47.33 OBSTRUCTIVE SLEEP APNEA: ICD-10-CM

## 2024-03-18 DIAGNOSIS — R01.1 NEWLY RECOGNIZED HEART MURMUR: ICD-10-CM

## 2024-03-18 DIAGNOSIS — R79.89 ELEVATED TROPONIN: ICD-10-CM

## 2024-03-18 DIAGNOSIS — R42 DIZZINESS: ICD-10-CM

## 2024-03-18 DIAGNOSIS — R47.01 EXPRESSIVE APHASIA: ICD-10-CM

## 2024-03-18 DIAGNOSIS — I25.10 CORONARY ARTERY CALCIFICATION SEEN ON CT SCAN: ICD-10-CM

## 2024-03-18 DIAGNOSIS — R56.9 NEW ONSET SEIZURE: ICD-10-CM

## 2024-03-18 DIAGNOSIS — R79.89 ELEVATED LIVER FUNCTION TESTS: ICD-10-CM

## 2024-03-18 DIAGNOSIS — E78.5 HYPERLIPIDEMIA LDL GOAL <70: ICD-10-CM

## 2024-03-18 DIAGNOSIS — I61.9 HEMORRHAGIC STROKE: ICD-10-CM

## 2024-03-18 DIAGNOSIS — E55.9 VITAMIN D DEFICIENCY: ICD-10-CM

## 2024-03-18 PROCEDURE — 3078F DIAST BP <80 MM HG: CPT | Performed by: PHYSICIAN ASSISTANT

## 2024-03-18 PROCEDURE — 1159F MED LIST DOCD IN RCRD: CPT | Performed by: PHYSICIAN ASSISTANT

## 2024-03-18 PROCEDURE — 3074F SYST BP LT 130 MM HG: CPT | Performed by: PHYSICIAN ASSISTANT

## 2024-03-18 PROCEDURE — 99214 OFFICE O/P EST MOD 30 MIN: CPT | Performed by: PHYSICIAN ASSISTANT

## 2024-03-18 PROCEDURE — 1160F RVW MEDS BY RX/DR IN RCRD: CPT | Performed by: PHYSICIAN ASSISTANT

## 2024-03-18 NOTE — PROGRESS NOTES
Subjective   Gaurav Mora is a 53 y.o. male  who presents today in follow up of hospitalization for dizziness and hypertension as well as routine follow-up of hypertension, hyperlipidemia, vitamin D deficiency, elevated CK, elevated LFT, SHANE, seizure disorder, history of intracranial hemorrhage.     Heart Murmur  Associated symptoms include weakness. Pertinent negatives include no arthralgias, fever or myalgias.   Hypertension    Hyperlipidemia  Pertinent negatives include no myalgias.     Dizziness, orthostatic hypotension- has not had recurrence.   Symptoms started with elevated /82 and had lay down - 123/72. Then he got up to go to the bathroom, he was dizzy and went to urgent care. It was about 4 hours into episode when he went to urgent care. He was sweating and clammy and having dizziness. With elevated BP, he was transported to ER by ambulance.   He was then seen at the emergency department 6/1/2023 with blood pressure 154/108 and complained of lightheadedness, dizziness, elevated blood pressure.  Advised to go to the ER.  She went to the emergency department where they noted sudden onset dizziness that started about 3 PM-dizziness characterized as off-balance feeling.  He denied spinning dizziness.  Symptoms were worse with head movement and walking, improved with rest.  ER labs-high-sensitivity troponin 33, delta troponin 9, creatinine 0.92, glucose 197, WBC 9.5, hemoglobin 14.6, platelets 237.  Chest x-ray negative for acute infiltration.  CT head without acute intracranial abnormality.  Symptoms were thought to be from peripheral source but cardiology and neurology were consulted.  He was admitted to observation.  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  Head CT unremarkable, MRI unremarkable, TTE unremarkable, orthostatic vital signs ordered and PT for Epley maneuver per Dr. Nithya Mesa.   Cardiology consultation-initial troponin  "was 33 with repeat increased to 41 then 40.  He denied chest pain and SOA.  They were concerned about hypertension due to history of stroke secondary to hypertension.  Dizziness was thought to be likely vertigo.  Nonspecific at bedtime troponin elevation.  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  At bedtime troponin was not considered to be significant-not thought to be type I or type II NSTEMI.  Asymptomatic without angina.  Light murmur on exam-echo without wall motion abnormality and normal EF.  Aortic valve was not ideally visualized although there was some calcification-thought to be the origination of the murmur-no aortic stenosis.  Did not feel dizziness was from cardiac source.  With blood pressure returning to normal in the spittle, blood pressure regimen was not changed.  Continue labetalol, lisinopril, amlodipine, and spironolactone.  Continue pravastatin and aspirin.  He was seen by cardiology in follow-up 6/9/2023.  They did not make changes to blood pressure or cholesterol regimen.  Aorta was noted to be stable.  Coronary calcification noted on CT and elevated troponin without anginal symptoms and already on appropriate medical therapy-no change to regimen.  Advised follow-up with Dr. Coates \"as scheduled\" in January 2024.  No appointment is scheduled.  Blood pressure has been ok at home since discharge. He continues to have some dizziness- he has no spinning dizziness and still having light-headedness intermittent. If sitting on couch, he may have while sitting. He also has sometimes when he changes positions. He started with positional dizziness about 1 year ago.   Overall, feeling a little better but still feeling \"off\".   Heart murmur, very small thoracic aortic aneurysm-following with cardiology.  3/2023-he denied CP, SOA, syncope, palpitations. [facility]. They advised he follow up here for us to order Echo. They dad not contacted his cardiologist for this because they were advised to follow up here.   He did have previous aortic root and ascending aortic dilitation.   I advised a follow-up with cardiology for consideration of echo and that I would order CTA chest and follow-up.  CTA chest with thoracic aorta 3.5 cm at sinus of Valsalva, 3.9 cm pulmonary artery, 2.4 cm aortic arch and 2.2 cm at the left atrium.  Coronary artery calcifications noted.  Right-sided gynecomastia.  I sent to his cardiologist-they advised they were following him very slight aneurysm.  Hypertension- patient has been stable on Norvasc 10 mg once daily, labetalol 100 mg twice daily, lisinopril 20 mg once daily, and spironolactone 25 mg once daily. BP at home- 110s-120s/70s-80s.   Hyperlipidemia- taking pravastatin and tolerating ok.  Neurology recommended adding Zetia to pravastatin.  [facility] couple weeks ago. That provider- Dr Swain reported bad cholesterol was elevated at 118. She suggested increasing pravastatin to 80 mg 3/4/2024- he already increased. He will go back in 6 weeks to recheck. Will go back 4/3/2024.  He previously had to stop statin due to significantly elevated CK as well as elevated LFT.    Had not been rechecked since 11/2019.   Talked to neurology- cardiology tried to send in Repatha and Praluent- not approved. Neurology is still recommending and will talk with cardiology to see if they can work together to get approved.   Prediabetes-on no medications.  Vitamin D deficiency- taking 5000 IU daily.   Previously- vitamin D 50,000 IU once weekly.  Elevated CK- following with neurology and rheumatology.   Elevated LFT- normal since 2019    SHANE- continues CPAP and follow up with sleep medicine.   History of hemorrhagic CVA 11/2017- he has been stable without new deficits or any new concerns. Following with neurology.   He previously had itching in groin area and  sometimes redness and flaking x months intermittent.  Seizure- no seizures while on medication.   Patient was hospitalized 4/28/2022-4/29/2022 for witnessed tonic clonic seizure with post-ictal state. Neurology was consulted and he was recommended to Keppra with MRI brain and EEG. Neurology thought seizure was secondary to temporal lobe hemorrhagic CVA.   CT head with temporal horm left > right.   MRI brain- 4/28/2022 with extensive atrophy and hemosiderin deposition left temporal lobe from previous hemorrhage. Hyperintensity left cerebral hemisphere- thought to be from bleed.  EEG- abnormal- intermittent left temporal slowing and reversals concerning for left temporal spike and polyspike complexes. Mild diffuse slowing from mild encephalopathy. Focal cortical irritability- epileptogenic potential- suggestive but not diagnostic of seizure d/o. 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       Patient's Specialists:  Cardiology- Dr. Bradford- last appt 6/2023 for hypertension, hyperlipidemia, dilated ascending aorta, aortic calcification, systolic murmur, and history of hemorrhagic CVA, and elevated CK. They tried to get Praluent and Repatha approved without success. Repeat scan for pulmonary nodule 3/2021- Aorta 3.8.  Aorta was stable on CTA chest 5/2023.  Coronary artery calcification without anginal symptoms and already on appropriate medical therapy with aspirin and pravastatin.  No further testing recommended. Follow up 1/2024.   General surgery-   Dr. Jeronimo Paul-last colonoscopy 2/2022 with benign polyp.  Recheck 5 years.  Dr Paulina Nina- last appt 8/2020 for muscle biopsy with elevated CK. Abnormal. Advised return to rheumatology  Nephrology- PENNY Ambrocio- last appt 6/2018 for htn, his hemorrhagic CVA, SHANE, right homonymous hemianopsia.    Neurology- Dr Garner-  Last appt 1/2024 for seizures- partial with secondary generalization and complex partial seizures, sequelae of left putaminal  hemorrhage, stable without new symptoms, hypertension, hyperlipidemia, elevated CK and ALT.will attempt to get myositis panel from Dr. Jessie Garner's office.  Continue risk factor control-lipids may be improved by Zetia-advised to discuss further with PCP.  Keppra 1000 mg BID with side effects. Continue oxcarbazepine 600 mg BID. If he developed proximal weakness, pursue an EMG. Follow up in 6 months.   Ophthalmology- Dr Armando- last appt 4/2018 following hemorrhagic CVA with chronic dissection left vertebral artery with complete right homonymous hemianopsia.   PM&R- Connie Haase, APRSANTOS- last appt 6/2018 with hemorrhagic CVA, homonymous hemiopsia, and aphasia.   Sleep medicine- Dr Garcia- last appt 6/2022 for SHANE. Compliant with CPAP. Stable. Follow up in 1 year.   Thoracic surgery- Dr Palomares, Destiney Campbell, APRN- last appt 3/2021 for pulmonary nodule. Last CT chest 3/2021 for pulmonary nodule- stable. Advised benign granulomatous disease. No need for follow up. Advised constant throat clearing- thought could be related to Lisinopril and to discuss with PCP. Follow up PRN.    Pulmonology- Dr Gacria- last appt 11/22/2019 for SHANE and pulmonary nodule. Stable and follow up with thoracic surgery as directed. Follow up with pulmonology in 1 year.   Rheumatology- Dr Laura Garner- last appt 9/2022 for elevated CK- muscle biopsy -type 1 fiber hypertrophy. Negative Aldolase- no signs or abnormalities for myositis, MRI negative for myositis. Advised following with neurology for monitoring annually due to likely neuromuscular etiology. Asymptomatic. Follow up PRN.   Urology- Dr. Marie- last appt 10/2019 for microhematuria- CT abd/pelvis and cystoscopy was negative. Follow up in 1 year.    The following portions of the patient's history were reviewed and updated as appropriate: allergies, current medications, past family history, past medical history, past social history, past surgical history and problem  list.    Review of Systems   Constitutional:  Negative for fever.   HENT: Negative.     Eyes:  Positive for visual disturbance.   Respiratory: Negative.     Cardiovascular: Negative.    Gastrointestinal: Negative.    Genitourinary: Negative.    Musculoskeletal:  Negative for arthralgias and myalgias.   Skin: Negative.    Neurological:  Positive for seizures (none since medication), speech difficulty and weakness.   Psychiatric/Behavioral: Negative.         Objective      Vitals:    03/18/24 0822   BP: 108/68   Pulse: 69   Resp: 16   Temp: 97.9 °F (36.6 °C)   SpO2: 99%     Body mass index is 36.75 kg/m².    Physical Exam   Constitutional: He is oriented to person, place, and time. He appears well-developed. No distress.   HENT:   Head: Normocephalic and atraumatic.   Right Ear: External ear normal.   Left Ear: External ear normal.   Eyes: Conjunctivae are normal.   Neck: Carotid bruit is not present. No tracheal deviation present. No thyroid mass and no thyromegaly present.   Cardiovascular: Normal rate, regular rhythm and normal pulses.   Murmur heard.  Pulmonary/Chest: Effort normal and breath sounds normal.   Abdominal: Normal appearance.   Neurological: He is alert and oriented to person, place, and time. Gait normal.   Skin: Skin is warm and dry.   Psychiatric: His behavior is normal. Mood, affect and thought content normal.   Nursing note and vitals reviewed.        Assessment & Plan   Diagnoses and all orders for this visit:    1. Essential hypertension (Primary)    2. Hyperlipidemia LDL goal <70    3. Elevated CK    4. Prediabetes    5. Vitamin D deficiency    6. Elevated liver function tests    7. Obstructive sleep apnea    8. Hemorrhagic stroke    9. Expressive aphasia    10. New onset seizure    11. Gait instability    12. Newly recognized heart murmur    13. Dilated aortic root    14. Ascending aorta dilatation    15. Coronary artery calcification seen on CT scan    16. Dizziness    17. Elevated  troponin        Assessment and Plan  [facility] to [facility], including labs for review since he just had labs there.  He will have any additional labs needed once I have reviewed [facility] lab results. Call if they do not hear about lab orders in 1 week. Stability of conditions, plan, follow up, and further recommendations pending additional labs.  If stable, follow-up in 6 months, fasting.    Aortic root and ascending aorta dilation, heart murmur- Patient to continue follow-up with cardiology as directed by them.   Hypertension, orthostatic hypotension- Blood pressure today is stable today. Continue Lisinopril 20 mg once daily, amlodipine 10 mg once daily, Labetalol 100 mg 3 times daily, and spironolactone 25 mg once daily. Monitor BP and call if any elevated BP > 135/85 or any recurrence of dizziness or hypotension.   Hyperlipidemia- Patient had to stop his statin due to significantly elevated CK and LFT. Cardiology has tried to get approval for Praluent and Repatha without success. He never experienced any muscle pain or weakness or felt any changes with stopping medication. He has been seen by rheumatology. To follow as directed by neurology with CK.  Neurology recommended adding Zetia to pravastatin 40 mg daily if elevated LDL.  However, [facility] and they increased Pravastatin from 40 mg to 80 mg daily. Await review of labs from [facility] for further recommendations.    Vitamin D deficiency- Continue vitamin D 5000 IU daily. Dosing recommendations pending labs.   Elevated CK- From review of rheumatology records, neurology may be best to continue to monitor, follow up, and determine any further workup or treatment in the future.   Elevated liver function tests- Liver function testing was briefly elevated and returned to normal 2019. I will continue to monitor.   SHANE- Continue CPAP and follow up with sleep medicine as directed by them.   History of hemorrhagic CVA- He had a significant  hemorrhagic CVA due to uncontrolled BP with residual deficits in 11/2017. His baseline prior to CVA is unknown to me, as I did not know him prior to CVA. He is doing well without CP, SOA, Palp, neuro symptoms, or other cardiovascular or cerebrovascular complaints. However, he has stiffness and some decreased physical ability. We will try physical therapy.   Seizure disorder- Patient has developed seizures that have been attributed to likely scar tissue or damage from previous hemorrhagic CVA. Continue medication and follow up per neurology.  Tinea cruris- He had itching in groin area and sometimes redness and flaking x months intermittent. He was given Lotrimin cream and powder for likely tinea cruris. To call or return if worsening, no improvement, new or changing symptoms.      From [facility] 9/2023.  He is up-to-date on colonoscopy.  He should ensure COVID-19 booster and annual flu shot.  With seizure treatment, we could consider DEXA in the future.    Patient continues to see specialists as noted above.  I have reviewed available records, including consult notes, labs, and imaging/testing.  Patient to continue follow-up with specialists as directed by them.     I spent 30 minutes caring for Gaurav Mora on this date of service. This time includes time spent by me in the following activities as necessary: preparing for the visit, reviewing tests, specialists records and previous visits, obtaining and/or reviewing a separately obtained history, performing a medically appropriate exam and/or evaluation, counseling and educating the patient, family, caregiver, referring and/or communicating with other healthcare professionals, documenting information in the medical record, independently interpreting results and communicating that information with the patient, family, caregiver, and developing a medically appropriate treatment plan with consideration of other conditions, medications, and treatments.            Answers  submitted by the patient for this visit:  Primary Reason for Visit (Submitted on 3/17/2024)  What is the primary reason for your visit?: Other  Other (Submitted on 3/17/2024)  Please describe your symptoms.: None  Have you had these symptoms before?: No  How long have you been having these symptoms?: Greater than 2 weeks

## 2024-04-16 RX ORDER — OXCARBAZEPINE 600 MG/1
TABLET, FILM COATED ORAL
Qty: 180 TABLET | Refills: 3 | Status: SHIPPED | OUTPATIENT
Start: 2024-04-16

## 2024-05-04 RX ORDER — LABETALOL 100 MG/1
100 TABLET, FILM COATED ORAL 3 TIMES DAILY
Qty: 270 TABLET | Refills: 1 | Status: SHIPPED | OUTPATIENT
Start: 2024-05-04

## 2024-05-20 RX ORDER — SPIRONOLACTONE 25 MG/1
TABLET ORAL
Qty: 90 TABLET | Refills: 1 | Status: SHIPPED | OUTPATIENT
Start: 2024-05-20

## 2024-06-08 DIAGNOSIS — I10 ESSENTIAL HYPERTENSION: ICD-10-CM

## 2024-06-12 ENCOUNTER — OFFICE VISIT (OUTPATIENT)
Dept: CARDIOLOGY | Facility: CLINIC | Age: 54
End: 2024-06-12
Payer: MEDICARE

## 2024-06-12 VITALS
WEIGHT: 243.4 LBS | HEART RATE: 66 BPM | DIASTOLIC BLOOD PRESSURE: 88 MMHG | HEIGHT: 69 IN | BODY MASS INDEX: 36.05 KG/M2 | SYSTOLIC BLOOD PRESSURE: 120 MMHG

## 2024-06-12 DIAGNOSIS — I61.9 HEMORRHAGIC CEREBROVASCULAR ACCIDENT (CVA): ICD-10-CM

## 2024-06-12 DIAGNOSIS — I10 ESSENTIAL HYPERTENSION: Primary | ICD-10-CM

## 2024-06-12 PROBLEM — E66.01 MORBID OBESITY WITH BMI OF 45.0-49.9, ADULT: Status: RESOLVED | Noted: 2017-12-06 | Resolved: 2024-06-12

## 2024-06-12 PROCEDURE — 3079F DIAST BP 80-89 MM HG: CPT | Performed by: INTERNAL MEDICINE

## 2024-06-12 PROCEDURE — 3074F SYST BP LT 130 MM HG: CPT | Performed by: INTERNAL MEDICINE

## 2024-06-12 PROCEDURE — 93000 ELECTROCARDIOGRAM COMPLETE: CPT | Performed by: INTERNAL MEDICINE

## 2024-06-12 PROCEDURE — 99214 OFFICE O/P EST MOD 30 MIN: CPT | Performed by: INTERNAL MEDICINE

## 2024-06-12 RX ORDER — LISINOPRIL 20 MG/1
TABLET ORAL
Qty: 90 TABLET | Refills: 1 | Status: SHIPPED | OUTPATIENT
Start: 2024-06-12

## 2024-06-12 NOTE — PROGRESS NOTES
Date of Office Visit: 24  Encounter Provider: Tommy Coates MD  Place of Service: [facility] CARDIOLOGY  Patient Name: Gaurav Mora  :1970  [id number]    Chief Complaint   Patient presents with    Hypertension   :     HPI: Gaurav Mora is a 53 y.o. male who is a former patient of Dr. Keanu Bradford's who has a history of hypertension, obstructive   sleep apnea, and hemorrhagic CVA who presents for follow-up. The patient's father is with him again today and assists significantly in his care.      The patient was visiting in [place], [place], for [facility] in . He experienced right sided neurological symptoms and visual disturbances on 2017. He was   brought to the hospital at that time where he was found to have an extensive hemorrhagic CVA.  His blood pressure upon presentation was 242/130.  He was unaware that he had hypertension prior to this, and it was felt that the hypertension caused the brain hemorrhage.  At that time, he was been placed on amlodipine, labetalol, lisinopril, and spironolactone with good results.  He still does have complete loss of sensation on the right side (although he still has good strength in   the right side), as well as loss of peripheral vision.  He did have an echocardiogram in [place] the time of his stroke on 2017 which showed an ejection fraction of 60%, and a very mildly dilated proximal aorta at 3.6 cm.  He did have a subsequent CT scan of his chest on 2019 which showed mild enlargement of the ascending aorta at 3.8 cm.  He also was found to have a significantly elevated CK level of 1044 on 2019.  He was taken off of Crestor at that time, and the level did decrease;however, it did remain chronically elevated afterwards.  He actually had a muscle biopsy by Dr. Jessie Garner in rheumatology which did not show any evidence   of muscle breakdown. Unfortunately, he had a seizure on 2022.    He was  started on seizure medicines and has not had a recurrence.    He is here for follow-up and has been doing well he is actually never really had a true cardiac issue he does have a chronic left bundle branch block.  He is got a little bit of a mild aortic aneurysm this been very stable no chest pain shortness of breath PND orthopnea edema he is disabled by this his father is with him and man what a fantastic caregiver.    He is here for follow-up today's been doing well no chest pain shortness of breath PND orthopnea edema blood pressures have been good he says he exercises regularly without limitation he is not sure why he is on a baby aspirin    Past Medical History:   Diagnosis Date    Elevated CK     Environmental allergies     Headache, tension-type     Heart murmur 3/15/23    Hemorrhagic cerebrovascular accident (CVA)     Hemorrhagic stroke 11/24/2017    Residual peripheral vision loss, right-sided sensation loss    History of bladder infections 12/2017    Hyperlipidemia     Hypertension     Lung nodule     New onset seizure 04/28/2022    SHANE on CPAP     WEARS CPAP    PONV (postoperative nausea and vomiting)     Seizure 04/28/2022    Stroke 2017       Past Surgical History:   Procedure Laterality Date    APPENDECTOMY N/A 1987    COLONOSCOPY N/A 02/17/2022    Procedure: COLONOSCOPY into  cecum with cold bx polypectomy;  Surgeon: Jeronimo Paul Jr., MD;  Location: [facility] ENDOSCOPY;  Service: General;  Laterality: N/A;  pre: screen  post: polyp     MUSCLE BIOPSY Left 08/19/2020    Procedure: quadriceps muscle biopsy;  Surgeon: Paulina Nina MD;  Location: [facility] MAIN OR;  Service: General;  Laterality: Left;       Social History     Socioeconomic History    Marital status: Single    Number of children: 0   Tobacco Use    Smoking status: Never     Passive exposure: Never    Smokeless tobacco: Never    Tobacco comments:     minimal caffeine   Vaping Use    Vaping status: Never Used   Substance and Sexual Activity     Alcohol use: Not Currently     Alcohol/week: 1.0 standard drink of alcohol     Types: 1 Cans of beer per week     Comment: This is rare.    Drug use: Never    Sexual activity: Never       Family History   Problem Relation Age of Onset    Breast cancer Mother     Atrial fibrillation Father     Hypertension Father     Skin cancer Father     Colon cancer Paternal Grandmother     Heart disease Maternal Grandfather     Parkinsonism Maternal Grandfather     Heart disease Maternal Aunt     Heart disease Maternal Uncle     Malig Hyperthermia Neg Hx        Review of Systems   Constitutional: Negative for decreased appetite, fever, malaise/fatigue and weight loss.   HENT:  Negative for nosebleeds.    Eyes:  Negative for double vision.   Cardiovascular:  Negative for chest pain, claudication, cyanosis, dyspnea on exertion, irregular heartbeat, leg swelling, near-syncope, orthopnea, palpitations, paroxysmal nocturnal dyspnea and syncope.   Respiratory:  Negative for cough, hemoptysis and shortness of breath.    Hematologic/Lymphatic: Negative for bleeding problem.   Skin:  Negative for rash.   Musculoskeletal:  Negative for falls and myalgias.   Gastrointestinal:  Negative for hematochezia, jaundice, melena, nausea and vomiting.   Genitourinary:  Negative for hematuria.   Neurological:  Negative for dizziness and seizures.   Psychiatric/Behavioral:  Negative for altered mental status and memory loss.        No Known Allergies      Current Outpatient Medications:     acetaminophen (TYLENOL) 325 MG tablet, Take 2 tablets by mouth Every 4 (Four) Hours As Needed for Mild Pain., Disp: , Rfl:     amLODIPine (NORVASC) 10 MG tablet, TAKE 1 TABLET DAILY, Disp: 90 tablet, Rfl: 1    aspirin 81 MG EC tablet, Take 1 tablet by mouth Daily., Disp: , Rfl:     ipratropium (ATROVENT) 0.06 % nasal spray, , Disp: , Rfl:     labetalol (NORMODYNE) 100 MG tablet, Take 1 tablet by mouth 3 (Three) Times a Day., Disp: 270 tablet, Rfl: 1     "levocetirizine (XYZAL) 5 MG tablet, Take 1 tablet by mouth Every Evening., Disp: , Rfl:     lisinopril (PRINIVIL,ZESTRIL) 20 MG tablet, TAKE 1 TABLET DAILY, Disp: 90 tablet, Rfl: 1    omeprazole (priLOSEC) 20 MG capsule, Take 1 capsule by mouth Daily., Disp: , Rfl:     OXcarbazepine (TRILEPTAL) 600 MG tablet, TAKE 1 TABLET TWICE A DAY, Disp: 180 tablet, Rfl: 3    pravastatin (PRAVACHOL) 40 MG tablet, 1 tablet Daily. (Patient taking differently: 2 tablets Daily.), Disp: 90 tablet, Rfl: 3    spironolactone (ALDACTONE) 25 MG tablet, TAKE 1 TABLET DAILY, Disp: 90 tablet, Rfl: 1    vitamin D3 125 MCG (5000 UT) capsule capsule, Take 1 capsule by mouth Daily., Disp: , Rfl:       Objective:     Vitals:    06/12/24 1239   BP: 120/88   Pulse: 66   Weight: 110 kg (243 lb 6.4 oz)   Height: 175.3 cm (69\")     Body mass index is 35.94 kg/m².    Constitutional:       Appearance: Well-developed.   Eyes:      General: No scleral icterus.  HENT:      Head: Normocephalic.   Neck:      Thyroid: No thyromegaly.      Vascular: No JVD.      Lymphadenopathy: No cervical adenopathy.   Pulmonary:      Effort: Pulmonary effort is normal.      Breath sounds: Normal breath sounds. No wheezing. No rales.   Cardiovascular:      Normal rate. Regular rhythm.      No gallop.    Edema:     Peripheral edema absent.   Abdominal:      Palpations: Abdomen is soft.      Tenderness: There is no abdominal tenderness.   Musculoskeletal: Normal range of motion. Skin:     General: Skin is warm and dry.      Findings: No rash.   Neurological:      Mental Status: Alert and oriented to person, place, and time.           ECG 12 Lead    Date/Time: 6/12/2024 1:10 PM  Performed by: Tommy Coates MD    Authorized by: Tommy Coates MD  Comparison: compared with previous ECG   Similar to previous ECG  Rhythm: sinus rhythm  Conduction: left bundle branch block    Clinical impression: abnormal EKG           Assessment:       Diagnosis Plan   1. Essential " hypertension        2. Hemorrhagic cerebrovascular accident (CVA)                 Plan:       Well I think I do not see any big changes with him I am not sure why he is on a baby aspirin he had a hemorrhagic stroke I do not think we should stay on it I also think he needs to be really careful with his blood pressure and make sure he is get good control I will have him come see Yarelis in a year and see me in 2    No follow-ups on file.     As always, it has been a pleasure to participate in your patient's care.      Sincerely,       Tommy Coates MD

## 2024-06-30 DIAGNOSIS — I10 ESSENTIAL HYPERTENSION: ICD-10-CM

## 2024-07-01 RX ORDER — AMLODIPINE BESYLATE 10 MG/1
TABLET ORAL
Qty: 90 TABLET | Refills: 0 | Status: SHIPPED | OUTPATIENT
Start: 2024-07-01

## 2024-07-08 ENCOUNTER — LAB (OUTPATIENT)
Dept: LAB | Facility: HOSPITAL | Age: 54
End: 2024-07-08
Payer: MEDICARE

## 2024-07-08 ENCOUNTER — OFFICE VISIT (OUTPATIENT)
Dept: NEUROLOGY | Facility: CLINIC | Age: 54
End: 2024-07-08
Payer: MEDICARE

## 2024-07-08 VITALS
OXYGEN SATURATION: 97 % | HEIGHT: 69 IN | HEART RATE: 80 BPM | DIASTOLIC BLOOD PRESSURE: 82 MMHG | BODY MASS INDEX: 35.87 KG/M2 | SYSTOLIC BLOOD PRESSURE: 120 MMHG | WEIGHT: 242.2 LBS

## 2024-07-08 DIAGNOSIS — Z86.73 HISTORY OF STROKE: ICD-10-CM

## 2024-07-08 DIAGNOSIS — R74.8 ELEVATED CK: ICD-10-CM

## 2024-07-08 DIAGNOSIS — I69.398 SEIZURE DISORDER AS SEQUELA OF CEREBROVASCULAR ACCIDENT: Primary | ICD-10-CM

## 2024-07-08 DIAGNOSIS — G40.909 SEIZURE DISORDER AS SEQUELA OF CEREBROVASCULAR ACCIDENT: Primary | ICD-10-CM

## 2024-07-08 LAB
ALBUMIN SERPL-MCNC: 4.7 G/DL (ref 3.5–5.2)
ALBUMIN/GLOB SERPL: 1.9 G/DL
ALP SERPL-CCNC: 70 U/L (ref 39–117)
ALT SERPL W P-5'-P-CCNC: 25 U/L (ref 1–41)
ANION GAP SERPL CALCULATED.3IONS-SCNC: 10.3 MMOL/L (ref 5–15)
AST SERPL-CCNC: 27 U/L (ref 1–40)
BILIRUB SERPL-MCNC: 0.3 MG/DL (ref 0–1.2)
BUN SERPL-MCNC: 10 MG/DL (ref 6–20)
BUN/CREAT SERPL: 9 (ref 7–25)
CALCIUM SPEC-SCNC: 8.9 MG/DL (ref 8.6–10.5)
CHLORIDE SERPL-SCNC: 98 MMOL/L (ref 98–107)
CK SERPL-CCNC: 778 U/L (ref 20–200)
CO2 SERPL-SCNC: 24.7 MMOL/L (ref 22–29)
CREAT SERPL-MCNC: 1.11 MG/DL (ref 0.76–1.27)
EGFRCR SERPLBLD CKD-EPI 2021: 79.9 ML/MIN/1.73
GLOBULIN UR ELPH-MCNC: 2.5 GM/DL
GLUCOSE SERPL-MCNC: 98 MG/DL (ref 65–99)
POTASSIUM SERPL-SCNC: 4.4 MMOL/L (ref 3.5–5.2)
PROT SERPL-MCNC: 7.2 G/DL (ref 6–8.5)
SODIUM SERPL-SCNC: 133 MMOL/L (ref 136–145)

## 2024-07-08 PROCEDURE — 36415 COLL VENOUS BLD VENIPUNCTURE: CPT

## 2024-07-08 PROCEDURE — 82550 ASSAY OF CK (CPK): CPT

## 2024-07-08 PROCEDURE — 80053 COMPREHEN METABOLIC PANEL: CPT

## 2024-07-08 RX ORDER — EZETIMIBE 10 MG/1
TABLET ORAL
COMMUNITY
Start: 2024-06-30

## 2024-07-08 NOTE — PROGRESS NOTES
CC: History of hemorrhagic stroke, complex partial seizures, myositis    HPI:  Gaurav Mora is a  54 y.o.  right-handed male who I am seeing for the first time in follow-up regarding history of hemorrhagic stroke and subsequent complex partial seizures as well as elevated CK.  He is accompanied by his father who adds to the history.  He has a past medical history of hypertension, hyperlipidemia and SHANE (CPAP).  He was last seen by Dr. Garner on 1/22/2024, with the following history taken from that note with additions/modifications as indicated:    In 2017 patient had a hemorrhagic stroke with right-sided weakness, visual disturbance and aphasia.  His stroke was felt to be from uncontrolled hypertension.  He has hypertension treated with 4 drugs and hyperlipidemia who had notable elevation of CK on a statin which did not fully resolve on stopping the statin.  In 2022, patient presented to the ER for a seizure.  He was started on Keppra but developed agitation and behavioral changes and was switched to oxcarbazepine at 600 mg twice daily.  He has not had any recurrent seizures and his behavior has been stable.    Patient has an elevated CK which has ranged generally from 500-900's.  It does not seem to make much difference if he is on a statin or not.  He was on rosuvastatin which was stopped for a year and then placed on pravastatin.  He does not have any complaints of weakness or muscle pain or cramping.  He had been seen by Dr. Jessie Garner of rheumatology and had a muscle biopsy which was negative for an inflammatory myopathy however it was still an abnormal study showing type II fiber atrophy with hypertrophic fibers with internalized nuclei.  The enzyme testing demonstrated no abnormalities and there was no excessive accumulation of glycogen.  No ragged red fibers.   Myositis 3 panel from 6/30/2020 was negative done through [facility] office.  Specifically the following antibodies were tested:  EJ, Marycarmen-1 (WB), Ku, Mi-2 antibodies, OJ, PL-12, PL-7, PM-Scl 100, PM-Scl 75, RNP, RO-52, signal recognition particle.    The patient was hospitalized 6/2023 and seen by Dr. Mesa of neurology and he had an MRI of the brain and duplex carotid study..  He was seen for dizziness of vertiginous type thought to be inner ear.  MRI of the brain showed no acute changes and the duplex carotid study showed less than 50% carotid stenosis and the vertebral flow was antegrade bilaterally.    History interim    [facility] states that [facility] his pravastatin from 40 mg to 80 mg because his LDL was 118.  Patient denies any muscle pain, weakness or cramping.  There have been efforts to get him on Repatha in the past but were unsuccessful.  It was recommended to add ezetimibe.  Patient was also told that his sodium was elevated but unsure how high.  He denies any change in urination, thirst vomiting/diarrhea, confusion or seizures.  He continues to take oxcarbazepine 600 mg twice a day without any side effects.  He denies any new strokelike symptoms.  He denies any falls.      Past Medical History:   Diagnosis Date    Elevated CK     Environmental allergies     Headache, tension-type     Heart murmur 3/15/23    Hemorrhagic cerebrovascular accident (CVA)     Hemorrhagic stroke 11/24/2017    Residual peripheral vision loss, right-sided sensation loss    History of bladder infections 12/2017    Hyperlipidemia     Hypertension     Lung nodule     New onset seizure 04/28/2022    SHANE on CPAP     WEARS CPAP    PONV (postoperative nausea and vomiting)     Seizure 04/28/2022    Stroke 2017         Past Surgical History:   Procedure Laterality Date    APPENDECTOMY N/A 1987    COLONOSCOPY N/A 02/17/2022    Procedure: COLONOSCOPY into  cecum with cold bx polypectomy;  Surgeon: Jeronimo Paul Jr., MD;  Location: [facility] ENDOSCOPY;  Service: General;  Laterality: N/A;  pre: screen  post: polyp     MUSCLE BIOPSY Left 08/19/2020     Procedure: quadriceps muscle biopsy;  Surgeon: Paulina Nina MD;  Location: [facility];  Service: General;  Laterality: Left;           Current Outpatient Medications:     acetaminophen (TYLENOL) 325 MG tablet, Take 2 tablets by mouth Every 4 (Four) Hours As Needed for Mild Pain., Disp: , Rfl:     amLODIPine (NORVASC) 10 MG tablet, TAKE 1 TABLET DAILY, Disp: 90 tablet, Rfl: 0    aspirin 81 MG EC tablet, Take 1 tablet by mouth Daily., Disp: , Rfl:     ezetimibe (ZETIA) 10 MG tablet, , Disp: , Rfl:     ipratropium (ATROVENT) 0.06 % nasal spray, , Disp: , Rfl:     labetalol (NORMODYNE) 100 MG tablet, Take 1 tablet by mouth 3 (Three) Times a Day., Disp: 270 tablet, Rfl: 1    levocetirizine (XYZAL) 5 MG tablet, Take 1 tablet by mouth Every Evening., Disp: , Rfl:     lisinopril (PRINIVIL,ZESTRIL) 20 MG tablet, TAKE 1 TABLET DAILY, Disp: 90 tablet, Rfl: 1    omeprazole (priLOSEC) 20 MG capsule, Take 1 capsule by mouth Daily., Disp: , Rfl:     OXcarbazepine (TRILEPTAL) 600 MG tablet, TAKE 1 TABLET TWICE A DAY, Disp: 180 tablet, Rfl: 3    pravastatin (PRAVACHOL) 40 MG tablet, 1 tablet Daily. (Patient taking differently: 2 tablets Daily.), Disp: 90 tablet, Rfl: 3    spironolactone (ALDACTONE) 25 MG tablet, TAKE 1 TABLET DAILY, Disp: 90 tablet, Rfl: 1    vitamin D3 125 MCG (5000 UT) capsule capsule, Take 1 capsule by mouth Daily., Disp: , Rfl:       Family History   Problem Relation Age of Onset    Breast cancer Mother     Atrial fibrillation Father     Hypertension Father     Skin cancer Father     Colon cancer Paternal Grandmother     Heart disease Maternal Grandfather     Parkinsonism Maternal Grandfather     Heart disease Maternal Aunt     Heart disease Maternal Uncle     Malig Hyperthermia Neg Hx          Social History     Socioeconomic History    Marital status: Single    Number of children: 0   Tobacco Use    Smoking status: Never     Passive exposure: Never    Smokeless tobacco: Never    Tobacco comments:      "minimal caffeine   Vaping Use    Vaping status: Never Used   Substance and Sexual Activity    Alcohol use: Not Currently     Alcohol/week: 1.0 standard drink of alcohol     Types: 1 Cans of beer per week     Comment: This is rare.    Drug use: Never    Sexual activity: Never         No Known Allergies      Pain Scale: 0/10        ROS:  Review of Systems   Eyes:  Positive for visual disturbance.   Genitourinary:  Negative for decreased urine volume.   Musculoskeletal:  Negative for gait problem.   Neurological:  Positive for seizures (No recurrent seizures). Negative for dizziness, syncope, facial asymmetry, weakness and numbness.   Psychiatric/Behavioral:  Negative for agitation, behavioral problems and confusion.          Physical Exam:  Vitals:    07/08/24 1308   BP: 120/82   Pulse: 80   SpO2: 97%   Weight: 110 kg (242 lb 3.2 oz)   Height: 175.3 cm (69\")     Orthostatic BP:    Body mass index is 35.77 kg/m².    Physical Exam  General: Obese white male no acute distress  HEENT: Normocephalic no evidence of trauma  Neck: Supple  Heart: Regular rate and rhythm  Extremities: Radial pulses strong and simultaneous      Neurological Exam:   Mental Status: Awake, alert, oriented to person, place and time.  Conversant without evidence of an affective disorder, thought disorder, delusions or hallucinations.  Attention span and concentration are normal.  HCF: No aphasia, apraxia or dysarthria.  Recent and remote memory intact.  Knowledge of recent events intact.  CN: I:   II: Right homonymous hemianopsia   III, IV, VI: Eye movements intact without nystagmus or ptosis.  Pupils equal round and reactive to light.   V,VII: Light touch and pinprick intact all 3 divisions of V.  Facial muscles symmetrical.   VIII: Hearing intact to finger rub   IX,X: Soft palate elevates symmetrically   XI: Sternomastoid and trapezius are strong.   XII: Tongue midline without atrophy or fasciculations  Motor: Normal tone and bulk in the upper and " lower extremities   Power testing: Mild weakness right ankle dorsiflexion but good strength in all other muscles tested in the arms and legs  Reflexes: Upper extremities: Diffusely hypoactive        Lower extremities: Diffusely hypoactive        Toe signs:  Sensory: Light touch:        Pinprick:        Vibration:        Position:    Cerebellar: Finger-to-nose: Slow           Rapid movement: Normal           Heel-to-shin:  Gait and Station: Mildly broad-based.  No Romberg no drift.    Results:      Lab Results   Component Value Date    GLUCOSE 103 (H) 09/18/2023    BUN 10 09/18/2023    CREATININE 0.82 09/18/2023    EGFRIFNONA 88 12/21/2021    EGFRIFAFRI 102 12/21/2021    BCR 12.2 09/18/2023    CO2 24.1 09/18/2023    CALCIUM 9.4 09/18/2023    PROTENTOTREF 7.1 09/18/2023    ALBUMIN 4.8 09/18/2023    LABIL2 2.1 09/18/2023    AST 25 09/18/2023    ALT 21 09/18/2023       Lab Results   Component Value Date    WBC 5.16 09/18/2023    HGB 14.6 09/18/2023    HCT 42.9 09/18/2023    MCV 81.3 09/18/2023     09/18/2023       Lab Results   Component Value Date    TSH 1.100 09/18/2023       Lab Results   Component Value Date    HGBA1C 5.70 (H) 09/18/2023       Lab Results   Component Value Date    SEDRATE 6 03/17/2020             Assessment:   1.  History of hemorrhagic stroke-no new strokelike symptoms.  Residual right dorsiflexion weakness and sensory changes.  Right homonymous hemianopsia.  2.  Complex partial seizures-no recurrence on oxcarbazepine 600 mg twice daily.  No behavioral changes.  3.  Elevated CK-patient denies any muscle pain, cramping or weakness.  Due to recent increase in pravastatin will recheck CK today.  Will also check CMP to check sodium levels and liver.          Plan:  1.  Check CK and CMP.  Told patient and father I would call them if results were abnormal  2.  Continue oxcarbazepine 600 mg twice daily  3.  Ideal targets for risk factor control would be Blood pressure < 130/80, B12 > 500, TSH in  "normal range and LDL < 70; HbA1c < 6.5 and smoking cessation if applicable. Call 911 for stroke symptoms.  Recommend 150 minutes of physical activity weekly.  4.  Stroke Acronym \"BE FAST\" B=Balance issues, E=Vision changes, F=Face weakness or numbness, A=Arm or Leg weakness or numbness, S=Speech problems and T=Time to call 911.  5.  Follow-up in 6 months or sooner if needed        Time: Spent 50 minutes in total encounter time. This included time for chart review, obtaining history, performing pertinent physical examination, updating medical information, ordering tests/referrals, discussion of diagnosis, medical management, counseling, and encounter documentation.                  Dictated utilizing Dragon dictation.   "

## 2024-07-08 NOTE — LETTER
July 8, 2024       No Recipients    Patient: Gaurav Mora   YOB: 1970   Date of Visit: 7/8/2024     Dear PENNY Hartman:       Thank you for referring Gaurav Mora to me for evaluation. Below are the relevant portions of my assessment and plan of care.    If you have questions, please do not hesitate to call me. I look forward to following Gaurav along with you.         Sincerely,        JUAN DANIEL Acharya        CC:   No Recipients    Nilda Ngo APRN  07/08/24 1406  Sign when Signing Visit  CC: History of hemorrhagic stroke, complex partial seizures, myositis    HPI:  Gaurav Mora is a  54 y.o.  right-handed male who I am seeing for the first time in follow-up regarding history of hemorrhagic stroke and subsequent complex partial seizures as well as elevated CK.  He is accompanied by his father who adds to the history.  He has a past medical history of hypertension, hyperlipidemia and SHANE (CPAP).  He was last seen by Dr. Garner on 1/22/2024, with the following history taken from that note with additions/modifications as indicated:    In 2017 patient had a hemorrhagic stroke with right-sided weakness, visual disturbance and aphasia.  His stroke was felt to be from uncontrolled hypertension.  He has hypertension treated with 4 drugs and hyperlipidemia who had notable elevation of CK on a statin which did not fully resolve on stopping the statin.  In 2022, patient presented to the ER for a seizure.  He was started on Keppra but developed agitation and behavioral changes and was switched to oxcarbazepine at 600 mg twice daily.  He has not had any recurrent seizures and his behavior has been stable.    Patient has an elevated CK which has ranged generally from 500-900's.  It does not seem to make much difference if he is on a statin or not.  He was on rosuvastatin which was stopped for a year and then placed on pravastatin.  He does not have any complaints of weakness or muscle pain or  cramping.  He had been seen by Dr. Jessie Garner of rheumatology and had a muscle biopsy which was negative for an inflammatory myopathy however it was still an abnormal study showing type II fiber atrophy with hypertrophic fibers with internalized nuclei.  The enzyme testing demonstrated no abnormalities and there was no excessive accumulation of glycogen.  No ragged red fibers.   Myositis 3 panel from 6/30/2020 was negative done through [facility].  Specifically the following antibodies were tested: EJ, Marycarmen-1 (WB), Ku, Mi-2 antibodies, OJ, PL-12, PL-7, PM-Scl 100, PM-Scl 75, RNP, RO-52, signal recognition particle.    The patient was hospitalized 6/2023 and seen by Dr. Mesa of neurology and he had an MRI of the brain and duplex carotid study..  He was seen for dizziness of vertiginous type thought to be inner ear.  MRI of the brain showed no acute changes and the duplex carotid study showed less than 50% carotid stenosis and the vertebral flow was antegrade bilaterally.    History interim    [facility] states that [facility] his pravastatin from 40 mg to 80 mg because his LDL was 118.  Patient denies any muscle pain, weakness or cramping.  There have been efforts to get him on Repatha in the past but were unsuccessful.  It was recommended to add ezetimibe.  Patient was also told that his sodium was elevated but unsure how high.  He denies any change in urination, thirst vomiting/diarrhea, confusion or seizures.  He continues to take oxcarbazepine 600 mg twice a day without any side effects.  He denies any new strokelike symptoms.  He denies any falls.      Past Medical History:   Diagnosis Date   • Elevated CK    • Environmental allergies    • Headache, tension-type    • Heart murmur 3/15/23   • Hemorrhagic cerebrovascular accident (CVA)    • Hemorrhagic stroke 11/24/2017    Residual peripheral vision loss, right-sided sensation loss   • History of bladder infections 12/2017   •  Hyperlipidemia    • Hypertension    • Lung nodule    • New onset seizure 04/28/2022   • SHANE on CPAP     WEARS CPAP   • PONV (postoperative nausea and vomiting)    • Seizure 04/28/2022   • Stroke 2017         Past Surgical History:   Procedure Laterality Date   • APPENDECTOMY N/A 1987   • COLONOSCOPY N/A 02/17/2022    Procedure: COLONOSCOPY into  cecum with cold bx polypectomy;  Surgeon: Jeronimo Paul Jr., MD;  Location: [facility] ENDOSCOPY;  Service: General;  Laterality: N/A;  pre: screen  post: polyp    • MUSCLE BIOPSY Left 08/19/2020    Procedure: quadriceps muscle biopsy;  Surgeon: Paulina Nina MD;  Location: [facility] MAIN OR;  Service: General;  Laterality: Left;           Current Outpatient Medications:   •  acetaminophen (TYLENOL) 325 MG tablet, Take 2 tablets by mouth Every 4 (Four) Hours As Needed for Mild Pain., Disp: , Rfl:   •  amLODIPine (NORVASC) 10 MG tablet, TAKE 1 TABLET DAILY, Disp: 90 tablet, Rfl: 0  •  aspirin 81 MG EC tablet, Take 1 tablet by mouth Daily., Disp: , Rfl:   •  ezetimibe (ZETIA) 10 MG tablet, , Disp: , Rfl:   •  ipratropium (ATROVENT) 0.06 % nasal spray, , Disp: , Rfl:   •  labetalol (NORMODYNE) 100 MG tablet, Take 1 tablet by mouth 3 (Three) Times a Day., Disp: 270 tablet, Rfl: 1  •  levocetirizine (XYZAL) 5 MG tablet, Take 1 tablet by mouth Every Evening., Disp: , Rfl:   •  lisinopril (PRINIVIL,ZESTRIL) 20 MG tablet, TAKE 1 TABLET DAILY, Disp: 90 tablet, Rfl: 1  •  omeprazole (priLOSEC) 20 MG capsule, Take 1 capsule by mouth Daily., Disp: , Rfl:   •  OXcarbazepine (TRILEPTAL) 600 MG tablet, TAKE 1 TABLET TWICE A DAY, Disp: 180 tablet, Rfl: 3  •  pravastatin (PRAVACHOL) 40 MG tablet, 1 tablet Daily. (Patient taking differently: 2 tablets Daily.), Disp: 90 tablet, Rfl: 3  •  spironolactone (ALDACTONE) 25 MG tablet, TAKE 1 TABLET DAILY, Disp: 90 tablet, Rfl: 1  •  vitamin D3 125 MCG (5000 UT) capsule capsule, Take 1 capsule by mouth Daily., Disp: , Rfl:       Family History   Problem  "Relation Age of Onset   • Breast cancer Mother    • Atrial fibrillation Father    • Hypertension Father    • Skin cancer Father    • Colon cancer Paternal Grandmother    • Heart disease Maternal Grandfather    • Parkinsonism Maternal Grandfather    • Heart disease Maternal Aunt    • Heart disease Maternal Uncle    • Malig Hyperthermia Neg Hx          Social History     Socioeconomic History   • Marital status: Single   • Number of children: 0   Tobacco Use   • Smoking status: Never     Passive exposure: Never   • Smokeless tobacco: Never   • Tobacco comments:     minimal caffeine   Vaping Use   • Vaping status: Never Used   Substance and Sexual Activity   • Alcohol use: Not Currently     Alcohol/week: 1.0 standard drink of alcohol     Types: 1 Cans of beer per week     Comment: This is rare.   • Drug use: Never   • Sexual activity: Never         No Known Allergies      Pain Scale: 0/10        ROS:  Review of Systems   Eyes:  Positive for visual disturbance.   Genitourinary:  Negative for decreased urine volume.   Musculoskeletal:  Negative for gait problem.   Neurological:  Positive for seizures (No recurrent seizures). Negative for dizziness, syncope, facial asymmetry, weakness and numbness.   Psychiatric/Behavioral:  Negative for agitation, behavioral problems and confusion.          Physical Exam:  Vitals:    07/08/24 1308   BP: 120/82   Pulse: 80   SpO2: 97%   Weight: 110 kg (242 lb 3.2 oz)   Height: 175.3 cm (69\")     Orthostatic BP:    Body mass index is 35.77 kg/m².    Physical Exam  General: Obese white male no acute distress  HEENT: Normocephalic no evidence of trauma  Neck: Supple  Heart: Regular rate and rhythm  Extremities: Radial pulses strong and simultaneous      Neurological Exam:   Mental Status: Awake, alert, oriented to person, place and time.  Conversant without evidence of an affective disorder, thought disorder, delusions or hallucinations.  Attention span and concentration are normal.  HCF: No " aphasia, apraxia or dysarthria.  Recent and remote memory intact.  Knowledge of recent events intact.  CN: I:   II: Right homonymous hemianopsia   III, IV, VI: Eye movements intact without nystagmus or ptosis.  Pupils equal round and reactive to light.   V,VII: Light touch and pinprick intact all 3 divisions of V.  Facial muscles symmetrical.   VIII: Hearing intact to finger rub   IX,X: Soft palate elevates symmetrically   XI: Sternomastoid and trapezius are strong.   XII: Tongue midline without atrophy or fasciculations  Motor: Normal tone and bulk in the upper and lower extremities   Power testing: Mild weakness right ankle dorsiflexion but good strength in all other muscles tested in the arms and legs  Reflexes: Upper extremities: Diffusely hypoactive        Lower extremities: Diffusely hypoactive        Toe signs:  Sensory: Light touch:        Pinprick:        Vibration:        Position:    Cerebellar: Finger-to-nose: Slow           Rapid movement: Normal           Heel-to-shin:  Gait and Station: Mildly broad-based.  No Romberg no drift.    Results:      Lab Results   Component Value Date    GLUCOSE 103 (H) 09/18/2023    BUN 10 09/18/2023    CREATININE 0.82 09/18/2023    EGFRIFNONA 88 12/21/2021    EGFRIFAFRI 102 12/21/2021    BCR 12.2 09/18/2023    CO2 24.1 09/18/2023    CALCIUM 9.4 09/18/2023    PROTENTOTREF 7.1 09/18/2023    ALBUMIN 4.8 09/18/2023    LABIL2 2.1 09/18/2023    AST 25 09/18/2023    ALT 21 09/18/2023       Lab Results   Component Value Date    WBC 5.16 09/18/2023    HGB 14.6 09/18/2023    HCT 42.9 09/18/2023    MCV 81.3 09/18/2023     09/18/2023       Lab Results   Component Value Date    TSH 1.100 09/18/2023       Lab Results   Component Value Date    HGBA1C 5.70 (H) 09/18/2023       Lab Results   Component Value Date    SEDRATE 6 03/17/2020             Assessment:   1.  History of hemorrhagic stroke-no new strokelike symptoms.  Residual right dorsiflexion weakness and sensory changes.   "Right homonymous hemianopsia.  2.  Complex partial seizures-no recurrence on oxcarbazepine 600 mg twice daily.  No behavioral changes.  3.  Elevated CK-patient denies any muscle pain, cramping or weakness.  Due to recent increase in pravastatin will recheck CK today.  Will also check CMP to check sodium levels and liver.          Plan:  1.  Check CK and CMP.  Told patient and father I would call them if results were abnormal  2.  Continue oxcarbazepine 600 mg twice daily  3.  Ideal targets for risk factor control would be Blood pressure < 130/80, B12 > 500, TSH in normal range and LDL < 70; HbA1c < 6.5 and smoking cessation if applicable. Call 911 for stroke symptoms.  Recommend 150 minutes of physical activity weekly.  4.  Stroke Acronym \"BE FAST\" B=Balance issues, E=Vision changes, F=Face weakness or numbness, A=Arm or Leg weakness or numbness, S=Speech problems and T=Time to call 911.  5.  Follow-up in 6 months or sooner if needed        Time: Spent 50 minutes in total encounter time. This included time for chart review, obtaining history, performing pertinent physical examination, updating medical information, ordering tests/referrals, discussion of diagnosis, medical management, counseling, and encounter documentation.                  Dictated utilizing Dragon dictation.   "

## 2024-09-16 ENCOUNTER — OFFICE VISIT (OUTPATIENT)
Dept: FAMILY MEDICINE CLINIC | Facility: CLINIC | Age: 54
End: 2024-09-16
Payer: MEDICARE

## 2024-09-16 VITALS
RESPIRATION RATE: 14 BRPM | TEMPERATURE: 97.2 F | WEIGHT: 243 LBS | OXYGEN SATURATION: 97 % | DIASTOLIC BLOOD PRESSURE: 70 MMHG | HEART RATE: 71 BPM | HEIGHT: 69 IN | BODY MASS INDEX: 35.99 KG/M2 | SYSTOLIC BLOOD PRESSURE: 124 MMHG

## 2024-09-16 DIAGNOSIS — R47.01 EXPRESSIVE APHASIA: ICD-10-CM

## 2024-09-16 DIAGNOSIS — I25.10 CORONARY ARTERY CALCIFICATION SEEN ON CT SCAN: ICD-10-CM

## 2024-09-16 DIAGNOSIS — R01.1 NEWLY RECOGNIZED HEART MURMUR: ICD-10-CM

## 2024-09-16 DIAGNOSIS — I77.810 ASCENDING AORTA DILATATION: ICD-10-CM

## 2024-09-16 DIAGNOSIS — I61.9 HEMORRHAGIC STROKE: ICD-10-CM

## 2024-09-16 DIAGNOSIS — R79.89 ELEVATED LIVER FUNCTION TESTS: ICD-10-CM

## 2024-09-16 DIAGNOSIS — S21.201A WOUND OF RIGHT SIDE OF BACK, INITIAL ENCOUNTER: ICD-10-CM

## 2024-09-16 DIAGNOSIS — I10 ESSENTIAL HYPERTENSION: Primary | ICD-10-CM

## 2024-09-16 DIAGNOSIS — R42 DIZZINESS: ICD-10-CM

## 2024-09-16 DIAGNOSIS — I77.810 DILATED AORTIC ROOT: ICD-10-CM

## 2024-09-16 DIAGNOSIS — R73.03 PREDIABETES: ICD-10-CM

## 2024-09-16 DIAGNOSIS — E55.9 VITAMIN D DEFICIENCY: ICD-10-CM

## 2024-09-16 DIAGNOSIS — R56.9 NEW ONSET SEIZURE: ICD-10-CM

## 2024-09-16 DIAGNOSIS — R26.81 GAIT INSTABILITY: ICD-10-CM

## 2024-09-16 DIAGNOSIS — G47.33 OBSTRUCTIVE SLEEP APNEA: ICD-10-CM

## 2024-09-16 DIAGNOSIS — R74.8 ELEVATED CK: ICD-10-CM

## 2024-09-16 DIAGNOSIS — E78.5 HYPERLIPIDEMIA LDL GOAL <70: ICD-10-CM

## 2024-09-16 DIAGNOSIS — I70.0 AORTIC CALCIFICATION: ICD-10-CM

## 2024-09-16 PROCEDURE — 1126F AMNT PAIN NOTED NONE PRSNT: CPT | Performed by: PHYSICIAN ASSISTANT

## 2024-09-16 PROCEDURE — 3074F SYST BP LT 130 MM HG: CPT | Performed by: PHYSICIAN ASSISTANT

## 2024-09-16 PROCEDURE — 1160F RVW MEDS BY RX/DR IN RCRD: CPT | Performed by: PHYSICIAN ASSISTANT

## 2024-09-16 PROCEDURE — 99214 OFFICE O/P EST MOD 30 MIN: CPT | Performed by: PHYSICIAN ASSISTANT

## 2024-09-16 PROCEDURE — 1159F MED LIST DOCD IN RCRD: CPT | Performed by: PHYSICIAN ASSISTANT

## 2024-09-16 PROCEDURE — 3078F DIAST BP <80 MM HG: CPT | Performed by: PHYSICIAN ASSISTANT

## 2024-09-16 RX ORDER — PRAVASTATIN SODIUM 80 MG/1
TABLET ORAL
COMMUNITY
Start: 2024-08-18

## 2024-09-16 RX ORDER — MONTELUKAST SODIUM 10 MG/1
TABLET ORAL
COMMUNITY
Start: 2024-07-27

## 2024-09-16 RX ORDER — DOXYCYCLINE 100 MG/1
100 CAPSULE ORAL 2 TIMES DAILY
Qty: 20 CAPSULE | Refills: 0 | Status: SHIPPED | OUTPATIENT
Start: 2024-09-16

## 2024-09-16 RX ORDER — GINSENG 100 MG
1 CAPSULE ORAL 2 TIMES DAILY
Qty: 28 G | Refills: 1 | Status: SHIPPED | OUTPATIENT
Start: 2024-09-16

## 2024-09-17 LAB
25(OH)D3+25(OH)D2 SERPL-MCNC: 40.9 NG/ML (ref 30–100)
ALBUMIN SERPL-MCNC: 4.4 G/DL (ref 3.5–5.2)
ALBUMIN/GLOB SERPL: 2 G/DL
ALP SERPL-CCNC: 74 U/L (ref 39–117)
ALT SERPL-CCNC: 23 U/L (ref 1–41)
APPEARANCE UR: CLEAR
AST SERPL-CCNC: 21 U/L (ref 1–40)
BACTERIA #/AREA URNS HPF: NORMAL /[HPF]
BASOPHILS # BLD AUTO: 0.03 10*3/MM3 (ref 0–0.2)
BASOPHILS NFR BLD AUTO: 0.5 % (ref 0–1.5)
BILIRUB SERPL-MCNC: 0.4 MG/DL (ref 0–1.2)
BILIRUB UR QL STRIP: NEGATIVE
BUN SERPL-MCNC: 9 MG/DL (ref 6–20)
BUN/CREAT SERPL: 10.8 (ref 7–25)
CALCIUM SERPL-MCNC: 9.3 MG/DL (ref 8.6–10.5)
CASTS URNS QL MICRO: NORMAL /LPF
CHLORIDE SERPL-SCNC: 100 MMOL/L (ref 98–107)
CHOLEST SERPL-MCNC: 125 MG/DL (ref 0–200)
CK SERPL-CCNC: 733 U/L (ref 20–200)
CO2 SERPL-SCNC: 24.7 MMOL/L (ref 22–29)
COLOR UR: YELLOW
CREAT SERPL-MCNC: 0.83 MG/DL (ref 0.76–1.27)
EGFRCR SERPLBLD CKD-EPI 2021: 105.3 ML/MIN/1.73
EOSINOPHIL # BLD AUTO: 0.15 10*3/MM3 (ref 0–0.4)
EOSINOPHIL NFR BLD AUTO: 2.5 % (ref 0.3–6.2)
EPI CELLS #/AREA URNS HPF: NORMAL /HPF (ref 0–10)
ERYTHROCYTE [DISTWIDTH] IN BLOOD BY AUTOMATED COUNT: 12.3 % (ref 12.3–15.4)
FOLATE SERPL-MCNC: 4.9 NG/ML (ref 4.78–24.2)
GLOBULIN SER CALC-MCNC: 2.2 GM/DL
GLUCOSE SERPL-MCNC: 106 MG/DL (ref 65–99)
GLUCOSE UR QL STRIP: NEGATIVE
HBA1C MFR BLD: 5.7 % (ref 4.8–5.6)
HCT VFR BLD AUTO: 41.8 % (ref 37.5–51)
HDLC SERPL-MCNC: 51 MG/DL (ref 40–60)
HGB BLD-MCNC: 14.1 G/DL (ref 13–17.7)
HGB UR QL STRIP: NEGATIVE
IMM GRANULOCYTES # BLD AUTO: 0.02 10*3/MM3 (ref 0–0.05)
IMM GRANULOCYTES NFR BLD AUTO: 0.3 % (ref 0–0.5)
KETONES UR QL STRIP: NEGATIVE
LDLC SERPL CALC-MCNC: 63 MG/DL (ref 0–100)
LDLC/HDLC SERPL: 1.28 {RATIO}
LEUKOCYTE ESTERASE UR QL STRIP: NEGATIVE
LYMPHOCYTES # BLD AUTO: 1.41 10*3/MM3 (ref 0.7–3.1)
LYMPHOCYTES NFR BLD AUTO: 23.5 % (ref 19.6–45.3)
MCH RBC QN AUTO: 27.8 PG (ref 26.6–33)
MCHC RBC AUTO-ENTMCNC: 33.7 G/DL (ref 31.5–35.7)
MCV RBC AUTO: 82.4 FL (ref 79–97)
MICRO URNS: NORMAL
MICRO URNS: NORMAL
MONOCYTES # BLD AUTO: 0.53 10*3/MM3 (ref 0.1–0.9)
MONOCYTES NFR BLD AUTO: 8.8 % (ref 5–12)
NEUTROPHILS # BLD AUTO: 3.87 10*3/MM3 (ref 1.7–7)
NEUTROPHILS NFR BLD AUTO: 64.4 % (ref 42.7–76)
NITRITE UR QL STRIP: NEGATIVE
NRBC BLD AUTO-RTO: 0 /100 WBC (ref 0–0.2)
PH UR STRIP: 7.5 [PH] (ref 5–7.5)
PLATELET # BLD AUTO: 246 10*3/MM3 (ref 140–450)
POTASSIUM SERPL-SCNC: 4.2 MMOL/L (ref 3.5–5.2)
PROT SERPL-MCNC: 6.6 G/DL (ref 6–8.5)
PROT UR QL STRIP: NEGATIVE
RBC # BLD AUTO: 5.07 10*6/MM3 (ref 4.14–5.8)
RBC #/AREA URNS HPF: NORMAL /HPF (ref 0–2)
SODIUM SERPL-SCNC: 137 MMOL/L (ref 136–145)
SP GR UR STRIP: 1.01 (ref 1–1.03)
T3FREE SERPL-MCNC: 2.5 PG/ML (ref 2–4.4)
T4 FREE SERPL-MCNC: 0.94 NG/DL (ref 0.92–1.68)
TRIGL SERPL-MCNC: 44 MG/DL (ref 0–150)
TSH SERPL DL<=0.005 MIU/L-ACNC: 1.2 UIU/ML (ref 0.27–4.2)
URATE SERPL-MCNC: 2.8 MG/DL (ref 3.4–7)
URINALYSIS REFLEX: NORMAL
UROBILINOGEN UR STRIP-MCNC: 0.2 MG/DL (ref 0.2–1)
VIT B12 SERPL-MCNC: 403 PG/ML (ref 211–946)
VLDLC SERPL CALC-MCNC: 11 MG/DL (ref 5–40)
WBC # BLD AUTO: 6.01 10*3/MM3 (ref 3.4–10.8)
WBC #/AREA URNS HPF: NORMAL /HPF (ref 0–5)

## 2024-09-19 DIAGNOSIS — I10 ESSENTIAL HYPERTENSION: ICD-10-CM

## 2024-09-23 ENCOUNTER — OFFICE VISIT (OUTPATIENT)
Dept: FAMILY MEDICINE CLINIC | Facility: CLINIC | Age: 54
End: 2024-09-23
Payer: MEDICARE

## 2024-09-23 VITALS
DIASTOLIC BLOOD PRESSURE: 80 MMHG | SYSTOLIC BLOOD PRESSURE: 124 MMHG | TEMPERATURE: 97 F | BODY MASS INDEX: 35.84 KG/M2 | HEART RATE: 74 BPM | HEIGHT: 69 IN | OXYGEN SATURATION: 98 % | RESPIRATION RATE: 14 BRPM | WEIGHT: 242 LBS

## 2024-09-23 DIAGNOSIS — R01.1 NEWLY RECOGNIZED HEART MURMUR: ICD-10-CM

## 2024-09-23 DIAGNOSIS — R79.89 ELEVATED LIVER FUNCTION TESTS: ICD-10-CM

## 2024-09-23 DIAGNOSIS — I25.10 CORONARY ARTERY CALCIFICATION SEEN ON CT SCAN: ICD-10-CM

## 2024-09-23 DIAGNOSIS — E55.9 VITAMIN D DEFICIENCY: ICD-10-CM

## 2024-09-23 DIAGNOSIS — R56.9 NEW ONSET SEIZURE: ICD-10-CM

## 2024-09-23 DIAGNOSIS — R74.8 ELEVATED CK: ICD-10-CM

## 2024-09-23 DIAGNOSIS — R47.01 EXPRESSIVE APHASIA: ICD-10-CM

## 2024-09-23 DIAGNOSIS — I10 ESSENTIAL HYPERTENSION: ICD-10-CM

## 2024-09-23 DIAGNOSIS — I70.0 AORTIC CALCIFICATION: ICD-10-CM

## 2024-09-23 DIAGNOSIS — I77.810 DILATED AORTIC ROOT: ICD-10-CM

## 2024-09-23 DIAGNOSIS — E53.8 B12 DEFICIENCY: ICD-10-CM

## 2024-09-23 DIAGNOSIS — E78.5 HYPERLIPIDEMIA LDL GOAL <70: ICD-10-CM

## 2024-09-23 DIAGNOSIS — I61.9 HEMORRHAGIC STROKE: ICD-10-CM

## 2024-09-23 DIAGNOSIS — S21.201A WOUND OF RIGHT SIDE OF BACK, INITIAL ENCOUNTER: Primary | ICD-10-CM

## 2024-09-23 DIAGNOSIS — G47.33 OBSTRUCTIVE SLEEP APNEA: ICD-10-CM

## 2024-09-23 DIAGNOSIS — I77.810 ASCENDING AORTA DILATATION: ICD-10-CM

## 2024-09-23 DIAGNOSIS — R42 DIZZINESS: ICD-10-CM

## 2024-09-23 DIAGNOSIS — R26.81 GAIT INSTABILITY: ICD-10-CM

## 2024-09-23 DIAGNOSIS — R73.03 PREDIABETES: ICD-10-CM

## 2024-09-23 PROCEDURE — 3079F DIAST BP 80-89 MM HG: CPT | Performed by: PHYSICIAN ASSISTANT

## 2024-09-23 PROCEDURE — 1159F MED LIST DOCD IN RCRD: CPT | Performed by: PHYSICIAN ASSISTANT

## 2024-09-23 PROCEDURE — 99214 OFFICE O/P EST MOD 30 MIN: CPT | Performed by: PHYSICIAN ASSISTANT

## 2024-09-23 PROCEDURE — 1160F RVW MEDS BY RX/DR IN RCRD: CPT | Performed by: PHYSICIAN ASSISTANT

## 2024-09-23 PROCEDURE — 1126F AMNT PAIN NOTED NONE PRSNT: CPT | Performed by: PHYSICIAN ASSISTANT

## 2024-09-23 PROCEDURE — 3074F SYST BP LT 130 MM HG: CPT | Performed by: PHYSICIAN ASSISTANT

## 2024-09-23 RX ORDER — UREA 10 %
500 LOTION (ML) TOPICAL DAILY
Qty: 90 TABLET | Refills: 1 | Status: SHIPPED | OUTPATIENT
Start: 2024-09-23

## 2024-09-25 DIAGNOSIS — S21.201A WOUND OF RIGHT SIDE OF BACK, INITIAL ENCOUNTER: ICD-10-CM

## 2024-09-25 RX ORDER — GINSENG 100 MG
1 CAPSULE ORAL 2 TIMES DAILY
Qty: 28 G | Refills: 1 | Status: SHIPPED | OUTPATIENT
Start: 2024-09-25

## 2024-09-25 RX ORDER — AMLODIPINE BESYLATE 10 MG/1
TABLET ORAL
Qty: 90 TABLET | Refills: 3 | Status: SHIPPED | OUTPATIENT
Start: 2024-09-25

## 2024-10-07 ENCOUNTER — OFFICE VISIT (OUTPATIENT)
Dept: FAMILY MEDICINE CLINIC | Facility: CLINIC | Age: 54
End: 2024-10-07
Payer: MEDICARE

## 2024-10-07 VITALS
BODY MASS INDEX: 35.84 KG/M2 | SYSTOLIC BLOOD PRESSURE: 116 MMHG | RESPIRATION RATE: 17 BRPM | DIASTOLIC BLOOD PRESSURE: 72 MMHG | WEIGHT: 242 LBS | HEIGHT: 69 IN | HEART RATE: 59 BPM | TEMPERATURE: 96.9 F | OXYGEN SATURATION: 98 %

## 2024-10-07 DIAGNOSIS — S21.201A WOUND OF RIGHT SIDE OF BACK, INITIAL ENCOUNTER: Primary | ICD-10-CM

## 2024-10-07 PROCEDURE — 3074F SYST BP LT 130 MM HG: CPT | Performed by: PHYSICIAN ASSISTANT

## 2024-10-07 PROCEDURE — 1159F MED LIST DOCD IN RCRD: CPT | Performed by: PHYSICIAN ASSISTANT

## 2024-10-07 PROCEDURE — 1126F AMNT PAIN NOTED NONE PRSNT: CPT | Performed by: PHYSICIAN ASSISTANT

## 2024-10-07 PROCEDURE — 99213 OFFICE O/P EST LOW 20 MIN: CPT | Performed by: PHYSICIAN ASSISTANT

## 2024-10-07 PROCEDURE — 3078F DIAST BP <80 MM HG: CPT | Performed by: PHYSICIAN ASSISTANT

## 2024-10-07 PROCEDURE — 1160F RVW MEDS BY RX/DR IN RCRD: CPT | Performed by: PHYSICIAN ASSISTANT

## 2024-10-07 RX ORDER — BACITRACIN ZINC 500 [USP'U]/G
OINTMENT TOPICAL
COMMUNITY
Start: 2024-09-25 | End: 2024-10-07 | Stop reason: SDUPTHER

## 2024-10-07 NOTE — PROGRESS NOTES
Subjective   Gaurav Mora is a 54 y.o. male  who presents today in follow up of wound on his back with dizziness and hypotension with hypertension, hyperlipidemia, vitamin D deficiency, B12 deficiency, elevated CK, elevated LFT, SHANE, seizure disorder, history of intracranial hemorrhage.     HPI  Answers submitted by the patient for this visit:  Other (Submitted on 10/6/2024)  Please describe your symptoms.: I have a large sore on my lower back that is healing slowly.  Have you had these symptoms before?: No  How long have you been having these symptoms?: Greater than 2 weeks  Primary Reason for Visit (Submitted on 10/6/2024)  What is the primary reason for your visit?: Problem Not Listed    Wound is improving-almost healed.  They continue to do dressing changes and bacitracin twice daily.  Finished doxycycline.  Noticed wound on right low back that started with yellow pus and dead skin. They have been using Neosporin. Cannot feel it much- irritated with bandage. May be improving.       From 9/2024-  Dizziness, orthostatic hypotension- has not had recurrence.   Symptoms started with elevated /82 and had lay down - 123/72. Then he got up to go to the bathroom, he was dizzy and went to urgent care. It was about 4 hours into episode when he went to urgent care. He was sweating and clammy and having dizziness. With elevated BP, he was transported to ER by ambulance.   He was then seen at the emergency department 6/1/2023 with blood pressure 154/108 and complained of lightheadedness, dizziness, elevated blood pressure.  Advised to go to the ER.  She went to the emergency department where they noted sudden onset dizziness that started about 3 PM-dizziness characterized as off-balance feeling.  He denied spinning dizziness.  Symptoms were worse with head movement and walking, improved with rest.  ER labs-high-sensitivity troponin 33, delta troponin 9, creatinine 0.92, glucose 197, WBC 9.5, hemoglobin 14.6, platelets  "237.  Chest x-ray negative for acute infiltration.  CT head without acute intracranial abnormality.  Symptoms were thought to be from peripheral source but cardiology and neurology were consulted.  He was admitted to observation.  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  Head CT unremarkable, MRI unremarkable, TTE unremarkable, orthostatic vital signs ordered and PT for Epley maneuver per Dr. Nithya Mesa.   Cardiology consultation-initial troponin was 33 with repeat increased to 41 then 40.  He denied chest pain and SOA.  They were concerned about hypertension due to history of stroke secondary to hypertension.  Dizziness was thought to be likely vertigo.  Nonspecific at bedtime troponin elevation.  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  At bedtime troponin was not considered to be significant-not thought to be type I or type II NSTEMI.  Asymptomatic without angina.  Light murmur on exam-echo without wall motion abnormality and normal EF.  Aortic valve was not ideally visualized although there was some calcification-thought to be the origination of the murmur-no aortic stenosis.  Did not feel dizziness was from cardiac source.  With blood pressure returning to normal in the spittle, blood pressure regimen was not changed.  Continue labetalol, lisinopril, amlodipine, and spironolactone.  Continue pravastatin and aspirin.  He was seen by cardiology in follow-up 6/9/2023.  They did not make changes to blood pressure or cholesterol regimen.  Aorta was noted to be stable.  Coronary calcification noted on CT and elevated troponin without anginal symptoms and already on appropriate medical therapy-no change to regimen.  Advised follow-up with Dr. Coates \"as scheduled\" in January 2024.  No appointment is scheduled.  Blood pressure has been ok at " "home since discharge. He continues to have some dizziness- he has no spinning dizziness and still having light-headedness intermittent. If sitting on couch, he may have while sitting. He also has sometimes when he changes positions. He started with positional dizziness about 1 year ago.   Overall, feeling a little better but still feeling \"off\".   Heart murmur, very small thoracic aortic aneurysm dilated aortic root, aortic calcification, coronary artery disease on CT, LBBB-following with cardiology.  Up-to-date with appointment.  They stopped aspirin.  3/2023-he denied CP, SOA, syncope, palpitations. [facility] They advised he follow up here for us to order Echo. They dad not contacted his cardiologist for this because they were advised to follow up here.   He did have previous aortic root and ascending aortic dilitation.   I advised a follow-up with cardiology for consideration of echo and that I would order CTA chest and follow-up.  CTA chest with thoracic aorta 3.5 cm at sinus of Valsalva, 3.9 cm pulmonary artery, 2.4 cm aortic arch and 2.2 cm at the left atrium.  Coronary artery calcifications noted.  Right-sided gynecomastia.  I sent to his cardiologist-they advised they were following him very slight aneurysm.  Hypertension- patient has been stable on Norvasc 10 mg once daily, labetalol 100 mg 3 x daily, lisinopril 20 mg once daily, and spironolactone 25 mg once daily. BP at home- 110s-120s/70s-80s.   Hyperlipidemia- taking pravastatin and tolerating ok.  Neurology recommended adding Zetia to pravastatin.  [facility]. That provider- Dr Swain reported bad cholesterol was elevated at 118. She suggested increasing pravastatin to 80 mg 3/4/2024- he already increased. He will go back in 6 weeks to recheck. Will go back 4/3/2024.  He previously had to stop statin due to significantly elevated CK as well as elevated LFT.    Had not been rechecked since 11/2019. "   Talked to neurology- cardiology tried to send in Repatha and Praluent- not approved. Neurology is still recommending and will talk with cardiology to see if they can work together to get approved.   Prediabetes-on no medications.  Vitamin D deficiency- taking 5000 IU daily.   Previously- vitamin D 50,000 IU once weekly.  B12 deficiency-not taking B12 currently.  Elevated CK- following with neurology and rheumatology.   Elevated LFT- normal since 2019    SHANE- continues CPAP and follow up with sleep medicine.   History of hemorrhagic CVA 11/2017- he has been stable without new deficits or any new concerns. Following with neurology.   He previously had itching in groin area and sometimes redness and flaking x months intermittent.  Seizure- no seizures while on medication.   Patient was hospitalized 4/28/2022-4/29/2022 for witnessed tonic clonic seizure with post-ictal state. Neurology was consulted and he was recommended to Keppra with MRI brain and EEG. Neurology thought seizure was secondary to temporal lobe hemorrhagic CVA.   CT head with temporal horm left > right.   MRI brain- 4/28/2022 with extensive atrophy and hemosiderin deposition left temporal lobe from previous hemorrhage. Hyperintensity left cerebral hemisphere- thought to be from bleed.  EEG- abnormal- intermittent left temporal slowing and reversals concerning for left temporal spike and polyspike complexes. Mild diffuse slowing from mild encephalopathy. Focal cortical irritability- epileptogenic potential- suggestive but not diagnostic of seizure d/o. 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       Patient's Specialists:  Cardiology- Dr. Bradford- last appt 6/2024 for hypertension, hyperlipidemia, dilated ascending aorta, aortic calcification, systolic murmur, and history of hemorrhagic CVA, and elevated CK.  Stopped aspirin 81 mg.  Follow-up 1 year.  They tried to get Praluent and Repatha approved without success. Repeat scan for pulmonary  nodule 3/2021- Aorta 3.8.  Aorta was stable on CTA chest 5/2023.  Coronary artery calcification without anginal symptoms and already on appropriate medical therapy with aspirin and pravastatin.  No further testing recommended. Follow up 1/2024.   General surgery-   Dr. Jeronimo Paul-last colonoscopy 2/2022 with benign polyp.  Recheck 5 years.  Dr Paulina Nina- last appt 8/2020 for muscle biopsy with elevated CK. Abnormal. Advised return to rheumatology  Nephrology- PENNY Ambrocio- last appt 6/2018 for htn, his hemorrhagic CVA, SHANE, right homonymous hemianopsia.    Neurology- Dr Garner-  Last appt 7/2024 for seizures- partial with secondary generalization and complex partial seizures, sequelae of left putaminal hemorrhage, stable without new symptoms, hypertension, hyperlipidemia, elevated CK and ALT.check CK and CMP with increased pravastatin.  Continue oxcarbazepine 600 mg twice daily, blood pressure goal <130/80, B12 > 500, TSH in normal range, LDL <70, and A1c <6.5%.  Follow-up 6 months.  They wanted to review myositis panel from Dr. Jessie Garner's office.  Continue risk factor control-lipids may be improved by Zetia-advised to discuss further with PCP.  Keppra 1000 mg BID with side effects. Continue oxcarbazepine 600 mg BID. If he developed proximal weakness, pursue an EMG.    Ophthalmology- Dr Armando- last appt 4/2018 following hemorrhagic CVA with chronic dissection left vertebral artery with complete right homonymous hemianopsia.   PM&R- Connie Haase, APRN- last appt 6/2018 with hemorrhagic CVA, homonymous hemiopsia, and aphasia.   Sleep medicine- Aysha Zarate, APRN- last appt 11/2023 for SHANE. Compliant with CPAP. Stable. Follow up in 1 year.   Thoracic surgery- Destiney Monteiro, APRSANTOS- last appt 3/2021 for pulmonary nodule. Last CT chest 3/2021 for pulmonary nodule- stable. Advised benign granulomatous disease. No need for follow up. Advised constant throat clearing- thought  could be related to Lisinopril and to discuss with PCP. Follow up PRN.    Pulmonology- Dr Garcia- last appt 11/22/2019 for SHANE and pulmonary nodule. Stable and follow up with thoracic surgery as directed. Follow up with pulmonology in 1 year.   Rheumatology- Dr Laura Garner- last appt 9/2022 for elevated CK- muscle biopsy -type 1 fiber hypertrophy. Negative Aldolase- no signs or abnormalities for myositis, MRI negative for myositis. Advised following with neurology for monitoring annually due to likely neuromuscular etiology. Asymptomatic. Follow up PRN.   Urology- Dr. Marie- last appt 10/2019 for microhematuria- CT abd/pelvis and cystoscopy was negative. Follow up in 1 year.    The following portions of the patient's history were reviewed and updated as appropriate: allergies, current medications, past family history, past medical history, past social history, past surgical history and problem list.    Review of Systems    Objective      Vitals:    10/07/24 1247   BP: 116/72   Pulse: 59   Resp: 17   Temp: 96.9 °F (36.1 °C)   SpO2: 98%       Body mass index is 35.72 kg/m².   Class 2 Severe Obesity (BMI >=35 and <=39.9). Obesity-related health conditions include the following: obstructive sleep apnea, hypertension, impaired fasting glucose, and dyslipidemias. Obesity is unchanged. BMI is is above average; BMI management plan is completed. We discussed portion control and increasing exercise.    Physical Exam   Constitutional: He is oriented to person, place, and time. He appears well-developed. No distress.   HENT:   Head: Normocephalic and atraumatic.   Eyes: Right eye exhibits no discharge. Left eye exhibits no discharge.   Pulmonary/Chest: Effort normal. No respiratory distress.   Neurological: He is alert and oriented to person, place, and time.   Skin: Skin is dry.        Psychiatric: His speech is normal and behavior is normal. He is attentive.         Assessment & Plan   Diagnoses and all orders for  this visit:    1. Wound of right side of back, initial encounter (Primary)              Assessment and Plan  I reviewed lab results with patient today.  Follow-up 3/2025 as scheduled, fasting.    Wound, right back- He developed a wound on the right side of his low back that appears to be ulceration versus bedsore type wound.  He denied trauma or laying in bed a lot.,  However, he is unable to feel the right side and was leaning in that direction while sitting.  I have advised that he avoid sitting to his right side due to decreased sensation and increased risk for pressure ulcers. I gave short course of doxycycline 100 mg twice daily for 10 days.  He has had significant improvement in wound with continued wound care at home.  Continue bacitracin twice daily and keep bandaged. To be seen ASAP if worsening, new or changing symptoms.  We will need to consider wound specialist if recurrence, worsening, new or changing symptoms.    From 9/2024-  Aortic root and ascending aorta dilation, heart murmur- Patient to continue follow-up with cardiology as directed by them.   Hypertension, orthostatic hypotension- Blood pressure today is stable today. Continue Lisinopril 20 mg once daily, amlodipine 10 mg once daily, Labetalol 100 mg 3 times daily, and spironolactone 25 mg once daily per cardiology. Monitor BP and call if any elevated BP > 135/85 or any recurrence of dizziness or hypotension.   Hyperlipidemia- Patient had to stop his statin due to significantly elevated CK and LFT. Cardiology has tried to get approval for Praluent and Repatha without success. He never experienced any muscle pain or weakness or felt any changes with stopping medication. He has been seen by rheumatology. To follow as directed by neurology with CK.  Neurology recommended adding Zetia to pravastatin 40 mg daily if elevated LDL.  However[facility] and [facility] P[facility]atin from 40 mg to 80 mg daily.  CK has remained significantly  elevated but stable and cholesterol has improved significantly.  Continue medication at current dose.      Vitamin D deficiency- Vitamin D level looks good.  Continue vitamin D 5000 IU daily.   B12 deficiency- Patient to take B12 500 mcg daily.  I will recheck at follow-up.  Elevated CK- From review of rheumatology records, neurology may be best to continue to monitor, follow up, and determine any further workup or treatment in the future.  Level remains significantly elevated but stable.  Elevated liver function tests- Liver function testing was briefly elevated and returned to normal 2019. I will continue to monitor.   SHANE- Continue CPAP and follow up with sleep medicine as directed by them.   History of hemorrhagic CVA- He had a significant hemorrhagic CVA due to uncontrolled BP with residual deficits in 11/2017. His baseline prior to CVA is unknown to me, as I did not know him prior to CVA. He is doing well without CP, SOA, Palp, neuro symptoms, or other cardiovascular or cerebrovascular complaints. However, he has stiffness and some decreased physical ability.  I previously referred for physical therapy.   Seizure disorder- Patient has developed seizures that have been attributed to likely scar tissue or damage from previous hemorrhagic CVA. Continue medication and follow up per neurology.    From [facility] 9/2023.  He is up-to-date on colonoscopy.  He should ensure COVID-19 booster and annual flu shot.  With seizure treatment, we could consider DEXA in the future.    Patient continues to see specialists as noted above.  I have reviewed available records, including consult notes, labs, and imaging/testing.  Patient to continue follow-up with specialists as directed by them.     I spent 20 minutes caring for Gaurav Mora on this date of service. This time includes time spent by me in the following activities as necessary: preparing for the visit, reviewing tests, specialists records and previous visits, obtaining  and/or reviewing a separately obtained history, performing a medically appropriate exam and/or evaluation, counseling and educating the patient, family, caregiver, referring and/or communicating with other healthcare professionals, documenting information in the medical record, independently interpreting results and communicating that information with the patient, family, caregiver, and developing a medically appropriate treatment plan with consideration of other conditions, medications, and treatments.

## 2024-10-31 RX ORDER — LABETALOL 100 MG/1
100 TABLET, FILM COATED ORAL 3 TIMES DAILY
Qty: 270 TABLET | Refills: 1 | Status: SHIPPED | OUTPATIENT
Start: 2024-10-31

## 2024-11-15 ENCOUNTER — OFFICE VISIT (OUTPATIENT)
Dept: SLEEP MEDICINE | Facility: HOSPITAL | Age: 54
End: 2024-11-15
Payer: MEDICARE

## 2024-11-15 VITALS
SYSTOLIC BLOOD PRESSURE: 121 MMHG | WEIGHT: 240 LBS | HEIGHT: 69 IN | DIASTOLIC BLOOD PRESSURE: 81 MMHG | HEART RATE: 66 BPM | BODY MASS INDEX: 35.55 KG/M2 | OXYGEN SATURATION: 96 %

## 2024-11-15 DIAGNOSIS — G47.33 OBSTRUCTIVE SLEEP APNEA: Primary | ICD-10-CM

## 2024-11-15 DIAGNOSIS — Z86.73 HISTORY OF STROKE: ICD-10-CM

## 2024-11-15 DIAGNOSIS — E66.9 OBESITY (BMI 30-39.9): ICD-10-CM

## 2024-11-15 PROCEDURE — G0463 HOSPITAL OUTPT CLINIC VISIT: HCPCS

## 2024-11-15 NOTE — PROGRESS NOTES
[facility]  Telephone: [phone] / Fax: [phone] [place]  Telephone: [phone] / Fax: [phone] Tasha Foster    PCP: Rebecca Nixon PA    Reason for visit: SHANE f/u    Gaurav Mora is a 54 y.o.male  [facility]. He is doing great on the CPAP machine in general, pressures of 8-12cm H2O appear comfortable. He uses a full face mask that fits well. He is unaware of leaks or excessive dry mouth.  His sleep schedule is 11pm-8:30am. ESS is 4. His health has been stable.    SH- no tobacco, no alcohol, 1 soda per day.    ROS- negative.    DME [facility]    Current Medications:    Current Outpatient Medications:     acetaminophen (TYLENOL) 325 MG tablet, Take 2 tablets by mouth Every 4 (Four) Hours As Needed for Mild Pain., Disp: , Rfl:     amLODIPine (NORVASC) 10 MG tablet, TAKE 1 TABLET DAILY, Disp: 90 tablet, Rfl: 3    bacitracin 500 UNIT/GM ointment, Apply 1 Application topically to the appropriate area as directed 2 (Two) Times a Day., Disp: 28 g, Rfl: 1    ezetimibe (ZETIA) 10 MG tablet, , Disp: , Rfl:     ipratropium (ATROVENT) 0.06 % nasal spray, , Disp: , Rfl:     labetalol (NORMODYNE) 100 MG tablet, TAKE 1 TABLET THREE TIMES A DAY, Disp: 270 tablet, Rfl: 1    levocetirizine (XYZAL) 5 MG tablet, Take 1 tablet by mouth Every Evening., Disp: , Rfl:     lisinopril (PRINIVIL,ZESTRIL) 20 MG tablet, TAKE 1 TABLET DAILY, Disp: 90 tablet, Rfl: 1    montelukast (SINGULAIR) 10 MG tablet, , Disp: , Rfl:     omeprazole (priLOSEC) 20 MG capsule, Take 1 capsule by mouth Daily., Disp: , Rfl:     OXcarbazepine (TRILEPTAL) 600 MG tablet, TAKE 1 TABLET TWICE A DAY, Disp: 180 tablet, Rfl: 3    pravastatin (PRAVACHOL) 80 MG tablet, , Disp: , Rfl:     spironolactone (ALDACTONE) 25 MG tablet, TAKE 1 TABLET DAILY, Disp: 90 tablet, Rfl: 1    vitamin B-12 (CYANOCOBALAMIN) 500 MCG tablet, Take 1 tablet by mouth Daily., Disp: 90 tablet, Rfl: 1    vitamin D3 125 MCG  "(5000 UT) capsule capsule, Take 1 capsule by mouth Daily., Disp: , Rfl:    also entered in Sleep Questionnaire    Patient  has a past medical history of Elevated CK, Environmental allergies, Headache, tension-type, Heart murmur (3/15/23), Hemorrhagic cerebrovascular accident (CVA), Hemorrhagic stroke (11/24/2017), History of bladder infections (12/2017), Hyperlipidemia, Hypertension, Lung nodule, New onset seizure (04/28/2022), SHANE on CPAP, PONV (postoperative nausea and vomiting), Seizure (04/28/2022), and Stroke (2017).    I have reviewed the Past Medical History, Past Surgical History, Social History and Family History.    Vital Signs /81   Pulse 66   Ht 175.3 cm (69.02\")   Wt 109 kg (240 lb)   SpO2 96%   BMI 35.42 kg/m²  Body mass index is 35.42 kg/m².    General Alert and oriented. No acute distress noted   Pharynx/Throat Class III  Mallampati airway, large tongue, no evidence of redundant lateral pharyngeal tissue. No oral lesions. No thrush. Moist mucous membranes.   Head Normocephalic. Symmetrical. Atraumatic.    Nose No septal deviation. No drainage   Chest Wall Normal shape. Symmetric expansion with respiration. No tenderness.   Neck Trachea midline, no thyromegaly or adenopathy    Lungs Clear to auscultation bilaterally. No wheezes. No rhonchi. No rales. Respirations regular, even and unlabored.   Heart Regular rhythm and normal rate. Normal S1 and S2. No murmur   Abdomen Soft, non-tender and non-distended. Normal bowel sounds. No masses.   Extremities Moves all extremities well. No edema   Psychiatric Normal mood and affect.     Testing:  Download last 90 d use is average nightly use of 9 hours and 2 minutes on auto CPAP 8-12cm H2O, avg pressure is 10.6cm  H2O, AHI is 1.3/hr, leak is 12.8L.min.    July-2023- Diagnostic data available to date is as below and was reviewed on current visit:  Outside sleep study at [facility] sleep [place] in [facility], 3/19/2018: AHI index 14.3 titrated " up to 11 cm water pressure       Impression:  1. Obstructive sleep apnea    2. Obesity (BMI 30-39.9)    3. History of stroke          Plan:  Patient uses the CPAP device and benefits from its use in terms of reduction of hypersomnia and snoring.  AHI appears to be within adequate range. I reviewed download report and original sleep study report with the patient. I advised pt to contact us if PAP pressures feel excessive or insufficient. No pressure changes were made today. He will continue 8-12cm H2O.    Follow up with Dr. Garcia in one year    Thank you for allowing me to participate in your patient's care.      JUAN DANIEL Yeboah  [facility]  Phone: [phone]      Part of this note may be an electronic transcription/translation of spoken language to printed text using the Dragon Dictation System.

## 2024-11-20 RX ORDER — SPIRONOLACTONE 25 MG/1
TABLET ORAL
Qty: 90 TABLET | Refills: 0 | Status: SHIPPED | OUTPATIENT
Start: 2024-11-20

## 2024-12-08 DIAGNOSIS — I10 ESSENTIAL HYPERTENSION: ICD-10-CM

## 2024-12-12 RX ORDER — LISINOPRIL 20 MG/1
TABLET ORAL
Qty: 90 TABLET | Refills: 0 | Status: SHIPPED | OUTPATIENT
Start: 2024-12-12

## 2025-01-09 ENCOUNTER — OFFICE VISIT (OUTPATIENT)
Dept: NEUROLOGY | Facility: CLINIC | Age: 55
End: 2025-01-09
Payer: MEDICARE

## 2025-01-09 VITALS
OXYGEN SATURATION: 98 % | WEIGHT: 240 LBS | BODY MASS INDEX: 35.42 KG/M2 | HEART RATE: 67 BPM | SYSTOLIC BLOOD PRESSURE: 144 MMHG | DIASTOLIC BLOOD PRESSURE: 100 MMHG

## 2025-01-09 DIAGNOSIS — I69.398 SEIZURE DISORDER AS SEQUELA OF CEREBROVASCULAR ACCIDENT: ICD-10-CM

## 2025-01-09 DIAGNOSIS — G40.909 SEIZURE DISORDER AS SEQUELA OF CEREBROVASCULAR ACCIDENT: ICD-10-CM

## 2025-01-09 DIAGNOSIS — R74.8 ELEVATED CK: ICD-10-CM

## 2025-01-09 DIAGNOSIS — Z86.73 HISTORY OF STROKE: Primary | ICD-10-CM

## 2025-01-09 RX ORDER — OXCARBAZEPINE 600 MG/1
600 TABLET, FILM COATED ORAL 2 TIMES DAILY
Qty: 180 TABLET | Refills: 3 | Status: SHIPPED | OUTPATIENT
Start: 2025-01-09

## 2025-01-09 NOTE — PROGRESS NOTES
NAME: Gaurav Mora   : 1970    Chief Complaint   Patient presents with    Seizure disorder as sequela of cerebrovascular accident        HPI:  Gaurav Mora is a 54 y.o.  right-handed male who I am seeing in follow-up regarding history of hemorrhagic stroke and subsequent complex partial seizures as well as elevated CK level.  He is accompanied by his father who adds to the history.  He has a past medical history of hypertension, hyperlipidemia and SHANE (CPAP).  I last saw him on 2024, with the following history taken from that note with additions/modifications as indicated:    In , patient had a hemorrhagic stroke with right-sided weakness, visual disturbance and aphasia.  His stroke was felt to be from uncontrolled hypertension.  His hypertension is treated with 4 drugs and hyperlipidemia who had notable elevation of CK on a statin, which did not fully resolve after stopping the statin.  In , patient presented to the ER for a seizure.  Keppra was initiated but he developed agitation and behavioral changes and was switched to oxcarbazepine at 600 mg twice daily.  He has not had any recurrent seizures and his behavior has been stable.     Patient has an elevated CK which has ranged generally from 500-900's.  It does not seem to make much difference if he is on a statin or not.  He was on rosuvastatin which was stopped for a year and then placed on pravastatin.  He does not have any complaints of weakness or muscle pain or cramping.  He had been seen by Dr. 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  The enzyme testing demonstrated no abnormalities and there was no excessive accumulation of glycogen.  No ragged red fibers.     Myositis 3 panel from 2020 was negative done through [facility].   Specifically, the following antibodies were tested: EJ, Marycarmen-1 (WB), Ku, Mi-2 antibodies, OJ, PL-12, PL-7, PM-Scl 100, PM-Scl 75, RNP, RO-52, signal recognition particle.     The patient was hospitalized 6/2023 and seen by Dr. Mesa of neurology and he had an MRI of the brain and duplex carotid study..  He was seen for dizziness of vertiginous type thought to be inner ear.  MRI of the brain showed no acute changes and the duplex carotid study showed less than 50% carotid stenosis and the vertebral flow was antegrade bilaterally.    History interim    Patient denies any change in his symptoms.  He specifically denies any new strokelike symptoms or seizures.  He reports occasionally he has difficulty standing after he has been sitting for a long time on a hard chair or bleachers but it improves with walking.  He denies any falls.  He denies any pain.  He continues to take oxcarbazepine 600 mg twice a day without any side effects.  [facility] increase his pravastatin to 80 mg daily.  Most recent lipid panel on 9/16/2024 showed cholesterol 125, HDL 51, LDL 63 and triglycerides of 44.  His CK level was also rechecked which was 733 and sodium level was normal at 137.  He denies any muscle pain, cramps or muscle weakness.  He continues to ride a stationary bike at the gym for 45 minutes twice a week.      Past Medical History:   Diagnosis Date    Elevated CK     Environmental allergies     Headache, tension-type     Heart murmur 3/15/23    Hemorrhagic cerebrovascular accident (CVA)     Hemorrhagic stroke 11/24/2017    Residual peripheral vision loss, right-sided sensation loss    History of bladder infections 12/2017    Hyperlipidemia     Hypertension     Lung nodule     New onset seizure 04/28/2022    SHANE on CPAP     WEARS CPAP    PONV (postoperative nausea and vomiting)     Seizure 04/28/2022    Stroke 2017         Past Surgical History:   Procedure Laterality Date    APPENDECTOMY N/A 1987    COLONOSCOPY N/A  02/17/2022    Procedure: COLONOSCOPY into  cecum with cold bx polypectomy;  Surgeon: Jeronimo Paul Jr., MD;  Location: [facility];  Service: General;  Laterality: N/A;  pre: screen  post: polyp     MUSCLE BIOPSY Left 08/19/2020    Procedure: quadriceps muscle biopsy;  Surgeon: Paulina Nina MD;  Location: [facility] OR;  Service: General;  Laterality: Left;           Current Outpatient Medications:     OXcarbazepine (TRILEPTAL) 600 MG tablet, Take 1 tablet by mouth 2 (Two) Times a Day., Disp: 180 tablet, Rfl: 3    acetaminophen (TYLENOL) 325 MG tablet, Take 2 tablets by mouth Every 4 (Four) Hours As Needed for Mild Pain., Disp: , Rfl:     amLODIPine (NORVASC) 10 MG tablet, TAKE 1 TABLET DAILY, Disp: 90 tablet, Rfl: 3    bacitracin 500 UNIT/GM ointment, Apply 1 Application topically to the appropriate area as directed 2 (Two) Times a Day., Disp: 28 g, Rfl: 1    ezetimibe (ZETIA) 10 MG tablet, , Disp: , Rfl:     ipratropium (ATROVENT) 0.06 % nasal spray, , Disp: , Rfl:     labetalol (NORMODYNE) 100 MG tablet, TAKE 1 TABLET THREE TIMES A DAY, Disp: 270 tablet, Rfl: 1    levocetirizine (XYZAL) 5 MG tablet, Take 1 tablet by mouth Every Evening., Disp: , Rfl:     lisinopril (PRINIVIL,ZESTRIL) 20 MG tablet, TAKE 1 TABLET DAILY, Disp: 90 tablet, Rfl: 0    montelukast (SINGULAIR) 10 MG tablet, , Disp: , Rfl:     omeprazole (priLOSEC) 20 MG capsule, Take 1 capsule by mouth Daily., Disp: , Rfl:     pravastatin (PRAVACHOL) 80 MG tablet, , Disp: , Rfl:     spironolactone (ALDACTONE) 25 MG tablet, TAKE 1 TABLET DAILY, Disp: 90 tablet, Rfl: 0    vitamin B-12 (CYANOCOBALAMIN) 500 MCG tablet, Take 1 tablet by mouth Daily., Disp: 90 tablet, Rfl: 1    vitamin D3 125 MCG (5000 UT) capsule capsule, Take 1 capsule by mouth Daily., Disp: , Rfl:       Family History   Problem Relation Age of Onset    Breast cancer Mother     Atrial fibrillation Father     Hypertension Father     Skin cancer Father     Colon cancer Paternal  Grandmother     Heart disease Maternal Grandfather     Parkinsonism Maternal Grandfather     Heart disease Maternal Aunt     Heart disease Maternal Uncle     Malig Hyperthermia Neg Hx          Social History     Socioeconomic History    Marital status: Single    Number of children: 0   Tobacco Use    Smoking status: Never     Passive exposure: Never    Smokeless tobacco: Never    Tobacco comments:     minimal caffeine   Vaping Use    Vaping status: Never Used   Substance and Sexual Activity    Alcohol use: Not Currently     Alcohol/week: 1.0 standard drink of alcohol     Types: 1 Cans of beer per week     Comment: This is rare.    Drug use: Never    Sexual activity: Never         No Known Allergies      Pain Scale: 0/10        ROS:  Review of Systems   Musculoskeletal:  Positive for gait problem.   Neurological:  Positive for seizures, weakness, light-headedness and numbness. Negative for dizziness, tremors, syncope, facial asymmetry, speech difficulty and headaches.   Psychiatric/Behavioral: Negative.         I have reviewed and agree with the above ROS complete by medical assistant.    Physical Exam:  Vitals:    01/09/25 1308   BP: 144/100   Pulse: 67   SpO2: 98%   Weight: 109 kg (240 lb)       Orthostatic BP:       Physical Exam  General: Obese white male no acute distress  HEENT: Normocephalic no evidence of trauma  Neck: Supple.    Heart: Regular rate and rhythm  Extremities: Radial pulses strong and simultaneous.        Neurological Exam:   Mental Status: Awake, alert, oriented to person, place and time.  Conversant without evidence of an affective disorder, thought disorder, delusions or hallucinations.  Attention span and concentration are normal.  HCF: No aphasia, apraxia or dysarthria.  Recent and remote memory intact.  Knowledge of recent events intact.  CN: I:   II: Visual fields right homonymous hemianopsia   III, IV, VI: Eye movements intact without nystagmus or ptosis.  Pupils equal round and reactive  to light.   V,VII: Light touch and pinprick intact all 3 divisions of V.  Facial muscles symmetrical.   VIII: Hearing intact to finger rub   IX,X: Soft palate elevates symmetrically   XI: Sternomastoid and trapezius are strong.   XII: Tongue midline without atrophy or fasciculations    Motor: Normal tone and bulk in the upper and lower extremities.    Power testing: Mild weakness of right ankle dorsiflexion but good strength in all other muscles tested in the arms and legs    Reflexes: Upper extremities: Diffusely hypoactive        Lower extremities: Diffusely hypoactive        Toe signs:        Clonus:     Sensory: Light touch:        Pinprick:        Vibration:        Position:        Temperature:    Cerebellar: Finger-to-nose: A little slower on the right           Rapid movement: Normal           Heel-to-shin:    Gait and Station: Comes to stand without difficulty.  Mildly broad-based.  Slightly spastic appearing gait in the right leg.  Able to raise on toes and heels with assist.  No Romberg no drift.      Results:    Lab Results   Component Value Date    GLUCOSE 106 (H) 09/16/2024    BUN 9 09/16/2024    CREATININE 0.83 09/16/2024    EGFRIFNONA 88 12/21/2021    EGFRIFAFRI 102 12/21/2021    BCR 10.8 09/16/2024    CO2 24.7 09/16/2024    CALCIUM 9.3 09/16/2024    PROTENTOTREF 6.6 09/16/2024    ALBUMIN 4.4 09/16/2024    LABIL2 2.0 09/16/2024    AST 21 09/16/2024    ALT 23 09/16/2024     Lab Results   Component Value Date    WBC 6.01 09/16/2024    HGB 14.1 09/16/2024    HCT 41.8 09/16/2024    MCV 82.4 09/16/2024     09/16/2024     Lab Results   Component Value Date    TSH 1.200 09/16/2024     Lab Results   Component Value Date    HSOZNKOZ77 403 09/16/2024     Lab Results   Component Value Date    FOLATE 4.90 09/16/2024     Lab Results   Component Value Date    HGBA1C 5.70 (H) 09/16/2024     Lab Results   Component Value Date    SEDRATE 6 03/17/2020         Assessment:    1.  History of hemorrhagic stroke-no  "new strokelike symptoms.  Residual right dorsiflexion weakness and sensory changes.  Right homonymous hemianopsia.  2.  Complex partial seizures-no recurrence.  Well-controlled on oxcarbazepine 600 mg twice a day.  No behavioral changes.  Sodium level was normal.  3.  Elevated CK level-denies any muscle pain, cramping or weakness.  Currently on pravastatin 80 mg daily but recent lipid panel was perfect.  [facility] may want to discuss with [facility] reducing dosage back to 40 mg daily to prevent any side effects.           Assessment and Plan   Diagnoses and all orders for this visit:    1. History of stroke (Primary)    2. Seizure disorder as sequela of cerebrovascular accident  -     OXcarbazepine (TRILEPTAL) 600 MG tablet; Take 1 tablet by mouth 2 (Two) Times a Day.  Dispense: 180 tablet; Refill: 3    3. Elevated CK        Ideal targets for risk factor control would be Blood pressure < 130/80, B12 > 500, TSH in normal range and LDL < 70; HbA1c < 6.5 and smoking cessation if applicable. Call 911 for stroke symptoms.  Recommend 150 minutes of physical activity weekly.  Limit alcohol intake.  Continue oxcarbazepine 600 mg twice daily  Stroke Acronym \"BE FAST\" B=Balance issues, E=Vision changes, F=Face weakness or numbness, A=Arm or Leg weakness or numbness, S=Speech problems and T=Time to call 911.   Follow-up in 6 months or sooner if needed      Time: Spent 30 minutes in total encounter time. This included time for chart review, obtaining history, performing pertinent physical examination, updating medical information, ordering tests/referrals, discussion of diagnosis, medical management, counseling, and encounter documentation.        JUAN DANIEL Acharya          Much of this encounter note was dictated utilizing Dragon dictation which is an electronic transcription/translation of spoken language to printed text. The electronic translation of spoken language may permit erroneous, or at times, nonsensical words or " phrases to be inadvertently transcribed; Although I have reviewed the note for such errors, some may still exist.    Portions of this assessment have been copied from previous documentation which has been thoroughly reviewed and updated as appropriate.

## 2025-01-10 ENCOUNTER — PATIENT ROUNDING (BHMG ONLY) (OUTPATIENT)
Dept: FAMILY MEDICINE CLINIC | Facility: CLINIC | Age: 55
End: 2025-01-10
Payer: MEDICARE

## 2025-01-10 ENCOUNTER — OFFICE VISIT (OUTPATIENT)
Dept: FAMILY MEDICINE CLINIC | Facility: CLINIC | Age: 55
End: 2025-01-10
Payer: MEDICARE

## 2025-01-10 VITALS
OXYGEN SATURATION: 98 % | RESPIRATION RATE: 18 BRPM | HEIGHT: 69 IN | WEIGHT: 244 LBS | BODY MASS INDEX: 36.14 KG/M2 | HEART RATE: 86 BPM | SYSTOLIC BLOOD PRESSURE: 110 MMHG | TEMPERATURE: 97.8 F | DIASTOLIC BLOOD PRESSURE: 70 MMHG

## 2025-01-10 DIAGNOSIS — Z00.00 MEDICARE ANNUAL WELLNESS VISIT, SUBSEQUENT: Primary | ICD-10-CM

## 2025-01-10 DIAGNOSIS — S21.201A WOUND OF RIGHT SIDE OF BACK, INITIAL ENCOUNTER: ICD-10-CM

## 2025-01-10 PROCEDURE — 1126F AMNT PAIN NOTED NONE PRSNT: CPT | Performed by: PHYSICIAN ASSISTANT

## 2025-01-10 PROCEDURE — 1160F RVW MEDS BY RX/DR IN RCRD: CPT | Performed by: PHYSICIAN ASSISTANT

## 2025-01-10 PROCEDURE — 1170F FXNL STATUS ASSESSED: CPT | Performed by: PHYSICIAN ASSISTANT

## 2025-01-10 PROCEDURE — 1159F MED LIST DOCD IN RCRD: CPT | Performed by: PHYSICIAN ASSISTANT

## 2025-01-10 PROCEDURE — 99213 OFFICE O/P EST LOW 20 MIN: CPT | Performed by: PHYSICIAN ASSISTANT

## 2025-01-10 PROCEDURE — 3074F SYST BP LT 130 MM HG: CPT | Performed by: PHYSICIAN ASSISTANT

## 2025-01-10 PROCEDURE — 3078F DIAST BP <80 MM HG: CPT | Performed by: PHYSICIAN ASSISTANT

## 2025-01-10 PROCEDURE — G0439 PPPS, SUBSEQ VISIT: HCPCS | Performed by: PHYSICIAN ASSISTANT

## 2025-01-10 RX ORDER — COLLAGENASE SANTYL 250 [ARB'U]/G
1 OINTMENT TOPICAL DAILY
Qty: 30 G | Refills: 1 | Status: SHIPPED | OUTPATIENT
Start: 2025-01-10

## 2025-01-10 NOTE — PROGRESS NOTES
Subjective   Gaurav Mora is a 54 y.o. male  who presents today in follow up of wound on his buttock with dizziness and hypotension with hypertension, hyperlipidemia, vitamin D deficiency, B12 deficiency, elevated CK, elevated LFT, SHANE, seizure disorder, history of intracranial hemorrhage.     HPI    Wound right buttock-Wound is improving-almost healed.  They continue to do dressing changes and bacitracin twice daily.    Finished doxycycline.  Noticed wound on right low back that started with yellow pus and dead skin. They have been using Neosporin. Cannot feel it much- irritated with bandage. May be improving.       From 9/2024-  Dizziness, orthostatic hypotension- has not had recurrence.   Symptoms started with elevated /82 and had lay down - 123/72. Then he got up to go to the bathroom, he was dizzy and went to urgent care. It was about 4 hours into episode when he went to urgent care. He was sweating and clammy and having dizziness. With elevated BP, he was transported to ER by ambulance.   He was then seen at the emergency department 6/1/2023 with blood pressure 154/108 and complained of lightheadedness, dizziness, elevated blood pressure.  Advised to go to the ER.  She went to the emergency department where they noted sudden onset dizziness that started about 3 PM-dizziness characterized as off-balance feeling.  He denied spinning dizziness.  Symptoms were worse with head movement and walking, improved with rest.  ER labs-high-sensitivity troponin 33, delta troponin 9, creatinine 0.92, glucose 197, WBC 9.5, hemoglobin 14.6, platelets 237.  Chest x-ray negative for acute infiltration.  CT head without acute intracranial abnormality.  Symptoms were thought to be from peripheral source but cardiology and neurology were consulted.  He was admitted to observation.  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  Head CT unremarkable, MRI unremarkable,  "TTE unremarkable, orthostatic vital signs ordered and PT for Epley maneuver per Dr. Nithya Mesa.   Cardiology consultation-initial troponin was 33 with repeat increased to 41 then 40.  He denied chest pain and SOA.  They were concerned about hypertension due to history of stroke secondary to hypertension.  Dizziness was thought to be likely vertigo.  Nonspecific at bedtime troponin elevation.  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  At bedtime troponin was not considered to be significant-not thought to be type I or type II NSTEMI.  Asymptomatic without angina.  Light murmur on exam-echo without wall motion abnormality and normal EF.  Aortic valve was not ideally visualized although there was some calcification-thought to be the origination of the murmur-no aortic stenosis.  Did not feel dizziness was from cardiac source.  With blood pressure returning to normal in the spittle, blood pressure regimen was not changed.  Continue labetalol, lisinopril, amlodipine, and spironolactone.  Continue pravastatin and aspirin.  He was seen by cardiology in follow-up 6/9/2023.  They did not make changes to blood pressure or cholesterol regimen.  Aorta was noted to be stable.  Coronary calcification noted on CT and elevated troponin without anginal symptoms and already on appropriate medical therapy-no change to regimen.  Advised follow-up with Dr. Coates \"as scheduled\" in January 2024.  No appointment is scheduled.  Blood pressure has been ok at home since discharge. He continues to have some dizziness- he has no spinning dizziness and still having light-headedness intermittent. If sitting on couch, he may have while sitting. He also has sometimes when he changes positions. He started with positional dizziness about 1 year ago.   Overall, feeling a little better but still feeling \"off\".   Heart murmur, very small " thoracic aortic aneurysm dilated aortic root, aortic calcification, coronary artery disease on CT, LBBB-following with cardiology.  Up-to-date with appointment.  They stopped aspirin.  3/2023-he denied CP, SOA, syncope, palpitations. [facility] They advised he follow up here for us to order Echo. They dad not contacted his cardiologist for this because they were advised to follow up here.   He did have previous aortic root and ascending aortic dilitation.   I advised a follow-up with cardiology for consideration of echo and that I would order CTA chest and follow-up.  CTA chest with thoracic aorta 3.5 cm at sinus of Valsalva, 3.9 cm pulmonary artery, 2.4 cm aortic arch and 2.2 cm at the left atrium.  Coronary artery calcifications noted.  Right-sided gynecomastia.  I sent to his cardiologist-they advised they were following him very slight aneurysm.  Hypertension- patient has been stable on Norvasc 10 mg once daily, labetalol 100 mg 3 x daily, lisinopril 20 mg once daily, and spironolactone 25 mg once daily. BP at home- 110s-120s/70s-80s.   Hyperlipidemia- taking pravastatin and tolerating ok.  Neurology recommended adding Zetia to pravastatin.  [facility]. That provider- Dr Swain reported bad cholesterol was elevated at 118. She suggested increasing pravastatin to 80 mg 3/4/2024- he already increased. He will go back in 6 weeks to recheck. Will go back 4/3/2024.  He previously had to stop statin due to significantly elevated CK as well as elevated LFT.    Had not been rechecked since 11/2019.   Talked to neurology- cardiology tried to send in Repatha and Praluent- not approved. Neurology is still recommending and will talk with cardiology to see if they can work together to get approved.   Prediabetes-on no medications.  Vitamin D deficiency- taking 5000 IU daily.   Previously- vitamin D 50,000 IU once weekly.  B12 deficiency-not taking B12 currently.  Elevated  CK- following with neurology and rheumatology.   Elevated LFT- normal since 2019    SHANE- continues CPAP and follow up with sleep medicine.   History of hemorrhagic CVA 11/2017- he has been stable without new deficits or any new concerns. Following with neurology.   He previously had itching in groin area and sometimes redness and flaking x months intermittent.  Seizure- no seizures while on medication.   Patient was hospitalized 4/28/2022-4/29/2022 for witnessed tonic clonic seizure with post-ictal state. Neurology was consulted and [facility]. Neurology thought seizure was secondary to temporal lobe hemorrhagic CVA.   CT head with temporal horm left > right.   MRI brain- 4/28/2022 with extensive atrophy and hemosiderin deposition left temporal lobe from previous hemorrhage. Hyperintensity left cerebral hemisphere- thought to be from bleed.  EEG- abnormal- intermittent left temporal slowing and reversals concerning for left temporal spike and polyspike complexes. Mild diffuse slowing from mild encephalopathy. Focal cortical irritability- epileptogenic potential- suggestive but not diagnostic of seizure d/o. 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       Patient's Specialists:  Cardiology- Dr. Bradford- last appt 6/2024 for hypertension, hyperlipidemia, dilated ascending aorta, aortic calcification, systolic murmur, and history of hemorrhagic CVA, and elevated CK.  Stopped aspirin 81 mg.  Follow-up 1 year.  They tried to get Praluent and Repatha approved without success. Repeat scan for pulmonary nodule 3/2021- Aorta 3.8.  Aorta was stable on CTA chest 5/2023.  Coronary artery calcification without anginal symptoms and already on appropriate medical therapy with aspirin and pravastatin.  No further testing recommended. Follow up 1/2024.   General surgery-   Dr. Jeronimo Paul-last colonoscopy 2/2022 with benign polyp.  Recheck 5 years.  Dr Paulina Nina- last appt  8/2020 for muscle biopsy with elevated CK. Abnormal. Advised return to rheumatology  Nephrology- PENNY Ambrocio- last appt 6/2018 for htn, his hemorrhagic CVA, SHANE, right homonymous hemianopsia.    Neurology- Dr Garner-  Last appt 7/2024 for seizures- partial with secondary generalization and complex partial seizures, sequelae of left putaminal hemorrhage, stable without new symptoms, hypertension, hyperlipidemia, elevated CK and ALT.check CK and CMP with increased pravastatin.  Continue oxcarbazepine 600 mg twice daily, blood pressure goal <130/80, B12 > 500, TSH in normal range, LDL <70, and A1c <6.5%.  Follow-up 6 months.  They wanted to review myositis panel from Dr. Jessie Garner's office.  Continue risk factor control-lipids may be improved by Zetia-advised to discuss further with PCP.  Keppra 1000 mg BID with side effects. Continue oxcarbazepine 600 mg BID. If he developed proximal weakness, pursue an EMG.    Ophthalmology- Dr Armando- last appt 4/2018 following hemorrhagic CVA with chronic dissection left vertebral artery with complete right homonymous hemianopsia.   PM&R- Connie Haase, APRN- last appt 6/2018 with hemorrhagic CVA, homonymous hemiopsia, and aphasia.   Sleep medicine- Aysha Zarate, APRN- last appt 11/2023 for SHANE. Compliant with CPAP. Stable. Follow up in 1 year.   Thoracic surgery- Destiney Monteiro, JUAN DANIEL- last appt 3/2021 for pulmonary nodule. Last CT chest 3/2021 for pulmonary nodule- stable. Advised benign granulomatous disease. No need for follow up. Advised constant throat clearing- thought could be related to Lisinopril and to discuss with PCP. Follow up PRN.    Pulmonology- Dr Garcia- last appt 11/22/2019 for SHANE and pulmonary nodule. Stable and follow up with thoracic surgery as directed. Follow up with pulmonology in 1 year.   Rheumatology- Dr Laura Garner- last appt 9/2022 for elevated CK- muscle biopsy -type 1 fiber hypertrophy. Negative Aldolase- no signs  or abnormalities for myositis, MRI negative for myositis. Advised following with neurology for monitoring annually due to likely neuromuscular etiology. Asymptomatic. Follow up PRN.   Urology- Dr. Marie- last appt 10/2019 for microhematuria- CT abd/pelvis and cystoscopy was negative. Follow up in 1 year.    The following portions of the patient's history were reviewed and updated as appropriate: allergies, current medications, past family history, past medical history, past social history, past surgical history and problem list.    Review of Systems    Objective      Vitals:    01/10/25 1224   BP: 110/70   Pulse: 86   Resp: 18   Temp: 97.8 °F (36.6 °C)   SpO2: 98%       Body mass index is 36.02 kg/m².   Class 2 Severe Obesity (BMI >=35 and <=39.9). Obesity-related health conditions include the following: obstructive sleep apnea, hypertension, impaired fasting glucose, and dyslipidemias. Obesity is unchanged. BMI is is above average; BMI management plan is completed. We discussed portion control and increasing exercise.    Physical Exam   Constitutional: He is oriented to person, place, and time. He appears well-developed. No distress.   HENT:   Head: Normocephalic and atraumatic.   Eyes: Right eye exhibits no discharge. Left eye exhibits no discharge.   Pulmonary/Chest: Effort normal. No respiratory distress.   Neurological: He is alert and oriented to person, place, and time.   Skin: Skin is dry.        Psychiatric: His speech is normal and behavior is normal. He is attentive.         Assessment & Plan   Diagnoses and all orders for this visit:    1. Medicare annual wellness visit, subsequent (Primary)    2. Wound of right side of back, initial encounter  -     collagenase (Santyl) 250 UNIT/GM ointment; Apply 1 Application topically to the appropriate area as directed Daily.  Dispense: 30 g; Refill: 1                Assessment and Plan  AWV completed today.  He is up-to-date on colonoscopy.  He is up-to-date  on all immunizations.  He will check with his pharmacy to see if RSV is covered and will update this if it is.  With seizure treatment, we could consider DEXA in the future.    Follow-up 3/2025 as scheduled, fasting.    Wound, right back- He developed a wound on the right side of his low back that appears to be ulceration versus bedsore type wound.  He denied trauma or laying in bed a lot.  However, he is unable to feel the right side and was leaning in that direction while sitting.  I have advised that he avoid sitting to his right side due to decreased sensation and increased risk for pressure ulcers. I gave short course of doxycycline 100 mg twice daily for 10 days.  He has had significant improvement in wound with continued wound care at home with bacitracin twice daily and keep bandaged, however, wound has not resolved in 4 months.  I will try to use Santyl ointment to see if this will heal completely.  We may have to use specialty pharmacy will complete prior authorization.  To be seen ASAP if worsening, new or changing symptoms.  We will need to consider wound specialist if no resolution, worsening, new or changing symptoms.    From 9/2024-  Aortic root and ascending aorta dilation, heart murmur- Patient to continue follow-up with cardiology as directed by them.   Hypertension, orthostatic hypotension- Blood pressure today is stable today. Continue Lisinopril 20 mg once daily, amlodipine 10 mg once daily, Labetalol 100 mg 3 times daily, and spironolactone 25 mg once daily per cardiology. Monitor BP and call if any elevated BP > 135/85 or any recurrence of dizziness or hypotension.   Hyperlipidemia- Patient had to stop his statin due to significantly elevated CK and LFT. Cardiology has tried to get approval for Praluent and Repatha without success. He never experienced any muscle pain or weakness or felt any changes with stopping medication. He has been seen by rheumatology. To follow as directed by neurology  with CK.  Neurology recommended adding Zetia to pravastatin 40 mg daily if elevated LDL.  However, [facility] and [facility] Pravastatin from 40 mg to 80 mg daily.  CK has remained significantly elevated but stable and cholesterol has improved significantly.  Continue medication at current dose.      Vitamin D deficiency- Vitamin D level looks good.  Continue vitamin D 5000 IU daily.   B12 deficiency- Patient to take B12 500 mcg daily.  I will recheck at follow-up.  Elevated CK- From review of rheumatology records, neurology may be best to continue to monitor, follow up, and determine any further workup or treatment in the future.  Level remains significantly elevated but stable.  Elevated liver function tests- Liver function testing was briefly elevated and returned to normal 2019. I will continue to monitor.   SHANE- Continue CPAP and follow up with sleep medicine as directed by them.   History of hemorrhagic CVA- He had a significant hemorrhagic CVA due to uncontrolled BP with residual deficits in 11/2017. His baseline prior to CVA is unknown to me, as I did not know him prior to CVA. He is doing well without CP, SOA, Palp, neuro symptoms, or other cardiovascular or cerebrovascular complaints. However, he has stiffness and some decreased physical ability.  I previously referred for physical therapy.   Seizure disorder- Patient has developed seizures that have been attributed to likely scar tissue or damage from previous hemorrhagic CVA. Continue medication and follow up per neurology.    Patient continues to see specialists as noted above.  I have reviewed available records, including consult notes, labs, and imaging/testing.  Patient to continue follow-up with specialists as directed by them.     I spent 35 minutes caring for Gaurav Mora on this date of service, including 15 minutes for AWV and 20 minutes for follow-up and problem focused visit. This time includes time spent by me in the  following activities as necessary: preparing for the visit, reviewing tests, specialists records and previous visits, obtaining and/or reviewing a separately obtained history, performing a medically appropriate exam and/or evaluation, counseling and educating the patient, family, caregiver, referring and/or communicating with other healthcare professionals, documenting information in the medical record, independently interpreting results and communicating that information with the patient, family, caregiver, and developing a medically appropriate treatment plan with consideration of other conditions, medications, and treatments.

## 2025-01-10 NOTE — PROGRESS NOTES
Subjective   The ABCs of the Annual Wellness Visit  Medicare Wellness Visit      Gaurav Mora is a 54 y.o. patient who presents for a Medicare Wellness Visit.    Last DEXA- has not had  Last colonoscopy- Dr Paul- last appt 2/2022- recheck in 5 years.   Last Tdap- 3/2020.   Prevnar 20- 3/2023  Pneumovax- has not had  Hepatitis A- has not had  Last flu shot-10/2024  Shingrix- had varicella as a child. Shingrix 9/2022, 11/2022  Covid 19- 3/2021, 4/2021, 11/2021, 6/2022, Spikevax 11/2023, 11/2024  RSV-has not had vaccination.    The following portions of the patient's history were reviewed and   updated as appropriate: allergies, current medications, past family history, past medical history, past social history, past surgical history, and problem list.    Compared to one year ago, the patient's physical   health is the same.  Compared to one year ago, the patient's mental   health is the same.    Recent Hospitalizations:  He was not admitted to the hospital during the last year.     Current Medical Providers:  Patient Care Team:  Rebecca Nixon PA as PCP - General (Family Medicine)    Outpatient Medications Prior to Visit   Medication Sig Dispense Refill    acetaminophen (TYLENOL) 325 MG tablet Take 2 tablets by mouth Every 4 (Four) Hours As Needed for Mild Pain.      amLODIPine (NORVASC) 10 MG tablet TAKE 1 TABLET DAILY 90 tablet 3    bacitracin 500 UNIT/GM ointment Apply 1 Application topically to the appropriate area as directed 2 (Two) Times a Day. 28 g 1    ezetimibe (ZETIA) 10 MG tablet       ipratropium (ATROVENT) 0.06 % nasal spray       labetalol (NORMODYNE) 100 MG tablet TAKE 1 TABLET THREE TIMES A  tablet 1    levocetirizine (XYZAL) 5 MG tablet Take 1 tablet by mouth Every Evening.      lisinopril (PRINIVIL,ZESTRIL) 20 MG tablet TAKE 1 TABLET DAILY 90 tablet 0    montelukast (SINGULAIR) 10 MG tablet       omeprazole (priLOSEC) 20 MG capsule Take 1 capsule by mouth Daily.      OXcarbazepine  "(TRILEPTAL) 600 MG tablet Take 1 tablet by mouth 2 (Two) Times a Day. 180 tablet 3    pravastatin (PRAVACHOL) 80 MG tablet       spironolactone (ALDACTONE) 25 MG tablet TAKE 1 TABLET DAILY 90 tablet 0    vitamin B-12 (CYANOCOBALAMIN) 500 MCG tablet Take 1 tablet by mouth Daily. 90 tablet 1    vitamin D3 125 MCG (5000 UT) capsule capsule Take 1 capsule by mouth Daily.       No facility-administered medications prior to visit.     No opioid medication identified on active medication list. I have reviewed chart for other potential  high risk medication/s and harmful drug interactions in the elderly.      Aspirin is not on active medication list.  Aspirin use is not indicated based on review of current medical condition/s. Risk of harm outweighs potential benefits.  .    Patient Active Problem List   Diagnosis    Essential hypertension    Alteration of sensations, post-stroke    Expressive aphasia    Obstructive sleep apnea    Hyperlipidemia LDL goal <70    Slow transit constipation    Hemorrhagic cerebrovascular accident (CVA)    Elevated CK    Acute encephalopathy    ICH (intracerebral hemorrhage)    IVH (intraventricular hemorrhage)    Acute kidney injury    Aspiration pneumonia    E. coli UTI (urinary tract infection)    Oropharyngeal dysphagia    Sleep-related breathing disorder    Urinary retention    Aphasia    Right homonymous hemianopsia    Colon cancer screening    New onset seizure    Dizziness    Aortic calcification    Systolic murmur    Prediabetes    Vitamin D deficiency    Elevated liver function tests    B12 deficiency     Advance Care Planning Advance Directive is on file.  ACP discussion was held with the patient during this visit. Patient has an advance directive in EMR which is still valid.             Objective   Vitals:    01/10/25 1224   BP: 110/70   Pulse: 86   Resp: 18   Temp: 97.8 °F (36.6 °C)   TempSrc: Temporal   SpO2: 98%   Weight: 111 kg (244 lb)   Height: 175.3 cm (69.02\")       Estimated " "body mass index is 36.02 kg/m² as calculated from the following:    Height as of this encounter: 175.3 cm (69.02\").    Weight as of this encounter: 111 kg (244 lb).                Does the patient have evidence of cognitive impairment? Yes- mild and stable- no acute deficits or abnormalities noted.                                                                                                Health  Risk Assessment    Smoking Status:  Social History     Tobacco Use   Smoking Status Never    Passive exposure: Never   Smokeless Tobacco Never   Tobacco Comments    minimal caffeine     Alcohol Consumption:  Social History     Substance and Sexual Activity   Alcohol Use Not Currently    Alcohol/week: 1.0 standard drink of alcohol    Types: 1 Cans of beer per week    Comment: This is rare.       Fall Risk Screen  STEADI Fall Risk Assessment was completed, and patient is at LOW risk for falls.Assessment completed on:1/10/2025    Depression Screening   Little interest or pleasure in doing things? Not at all   Feeling down, depressed, or hopeless? Not at all   PHQ-2 Total Score 0      Health Habits and Functional and Cognitive Screenin/10/2025    12:00 PM   Functional & Cognitive Status   Do you have difficulty preparing food and eating? No   Do you have difficulty bathing yourself, getting dressed or grooming yourself? No   Do you have difficulty using the toilet? No   Do you have difficulty moving around from place to place? No   Do you have trouble with steps or getting out of a bed or a chair? No   Current Diet Well Balanced Diet   Dental Exam Up to date   Eye Exam Up to date   Exercise (times per week) 3 times per week   Current Exercises Include Stationary Bicycling/Spin Class   Do you need help using the phone?  No   Are you deaf or do you have serious difficulty hearing?  No   Do you need help to go to places out of walking distance? No   Do you need help shopping? No   Do you need help preparing meals?  No "   Do you need help with housework?  No   Do you need help with laundry? No   Do you need help taking your medications? No   Do you need help managing money? No   Do you ever drive or ride in a car without wearing a seat belt? No   Have you felt unusual stress, anger or loneliness in the last month? No   Who do you live with? Other   If you need help, do you have trouble finding someone available to you? No   Have you been bothered in the last four weeks by sexual problems? No   Do you have difficulty concentrating, remembering or making decisions? No           Age-appropriate Screening Schedule:  Refer to the list below for future screening recommendations based on patient's age, sex and/or medical conditions. Orders for these recommended tests are listed in the plan section. The patient has been provided with a written plan.    Health Maintenance List  Health Maintenance   Topic Date Due    LIPID PANEL  09/16/2025    ANNUAL WELLNESS VISIT  01/10/2026    BMI FOLLOWUP  01/10/2026    TDAP/TD VACCINES (2 - Td or Tdap) 03/15/2030    COLORECTAL CANCER SCREENING  02/17/2032    HEPATITIS C SCREENING  Completed    COVID-19 Vaccine  Completed    INFLUENZA VACCINE  Completed    ZOSTER VACCINE  Completed    Pneumococcal Vaccine 0-64  Aged Out                                                                                                                                                CMS Preventative Services Quick Reference  Risk Factors Identified During Encounter  Immunizations Discussed/Encouraged: RSV (Respiratory Syncytial Virus)    The above risks/problems have been discussed with the patient.  Pertinent information has been shared with the patient in the After Visit Summary.  An After Visit Summary and PPPS were made available to the patient.    Follow Up:   Next Medicare Wellness visit to be scheduled in 1 year.     Assessment & Plan  Medicare annual wellness visit, subsequent         Wound of right side of back,  initial encounter    Orders:    collagenase (Santyl) 250 UNIT/GM ointment; Apply 1 Application topically to the appropriate area as directed Daily.         Follow Up:   No follow-ups on file.

## 2025-01-10 NOTE — PROGRESS NOTES
"My name is Genaro Arteaga     I am the Practice Manager with   [facility] [address] [address] [phone]      I am messaging to officially welcome you to our practice and ask about your recent visit.     Tell me about your visit with us. What things went well?         We're always looking for ways to make our patients' experiences even better. Do you have recommendations on ways we may improve?       Overall were you satisfied with your first visit to our practice?        Is there anything else I can do for you?     Also, please note that you may receive a survey from \"[employer]\" please take time to fill that out, as it provides important feedback for our office.         Thank you, and have a great day.   "

## 2025-01-23 DIAGNOSIS — I69.398 SEIZURE DISORDER AS SEQUELA OF CEREBROVASCULAR ACCIDENT: ICD-10-CM

## 2025-01-23 DIAGNOSIS — G40.909 SEIZURE DISORDER AS SEQUELA OF CEREBROVASCULAR ACCIDENT: ICD-10-CM

## 2025-01-23 RX ORDER — OXCARBAZEPINE 600 MG/1
600 TABLET, FILM COATED ORAL 2 TIMES DAILY
Qty: 180 TABLET | Refills: 0 | Status: SHIPPED | OUTPATIENT
Start: 2025-01-23

## 2025-01-23 NOTE — TELEPHONE ENCOUNTER
Caller: MorganDayo    Relationship: Father    Best call back number: [phone]    Requested Prescriptions:   Requested Prescriptions     Pending Prescriptions Disp Refills    OXcarbazepine (TRILEPTAL) 600 MG tablet 180 tablet 3     Sig: Take 1 tablet by mouth 2 (Two) Times a Day.        Pharmacy where request should be sent: [id number] - [facility] [address] [address] [address] AT [facility] [address] [address] & [address] [phone] [facility] [phone] FX     Last office visit with prescribing clinician: 1/9/2025   Last telemedicine visit with prescribing clinician: Visit date not found   Next office visit with prescribing clinician: 7/10/2025     Additional details provided by patient: PT ONLY HAS 3 DAYS LEFT OF THIS MEDICATION.  HE GETS THIS THROUGH [facility] AND IT IS BEING DELAYED IN BEING DELIVERED.  TRACKING SHOWS THAT IT IS SOMEWHERE IN [place] WITH NO EXPECTED DELIVERY DATE.  THEY WOULD LIKE THIS SENT TO HIS LOCAL [facility] SO HE DOES NOT RUN OUT    Does the patient have less than a 3 day supply:  [x] Yes  [] No    Would you like a call back once the refill request has been completed: [x] Yes [] No    If the office needs to give you a call back, can they leave a voicemail: [x] Yes [] No    Terence Coates Rep   01/23/25 10:13 EST

## 2025-02-19 RX ORDER — SPIRONOLACTONE 25 MG/1
TABLET ORAL
Qty: 90 TABLET | Refills: 3 | Status: SHIPPED | OUTPATIENT
Start: 2025-02-19

## 2025-03-05 DIAGNOSIS — I10 ESSENTIAL HYPERTENSION: ICD-10-CM

## 2025-03-10 DIAGNOSIS — E53.8 B12 DEFICIENCY: ICD-10-CM

## 2025-03-10 DIAGNOSIS — I10 ESSENTIAL HYPERTENSION: Primary | ICD-10-CM

## 2025-03-10 DIAGNOSIS — R74.8 ELEVATED CK: ICD-10-CM

## 2025-03-10 DIAGNOSIS — R79.89 ELEVATED LIVER FUNCTION TESTS: ICD-10-CM

## 2025-03-10 DIAGNOSIS — E78.5 HYPERLIPIDEMIA LDL GOAL <70: ICD-10-CM

## 2025-03-10 DIAGNOSIS — R73.03 PREDIABETES: ICD-10-CM

## 2025-03-10 DIAGNOSIS — E55.9 VITAMIN D DEFICIENCY: ICD-10-CM

## 2025-03-10 RX ORDER — LISINOPRIL 20 MG/1
TABLET ORAL
Qty: 90 TABLET | Refills: 1 | Status: SHIPPED | OUTPATIENT
Start: 2025-03-10

## 2025-03-11 LAB
25(OH)D3+25(OH)D2 SERPL-MCNC: 36.3 NG/ML (ref 30–100)
ALBUMIN SERPL-MCNC: 4.6 G/DL (ref 3.5–5.2)
ALBUMIN/GLOB SERPL: 2.1 G/DL
ALP SERPL-CCNC: 70 U/L (ref 39–117)
ALT SERPL-CCNC: 22 U/L (ref 1–41)
AST SERPL-CCNC: 24 U/L (ref 1–40)
BASOPHILS # BLD AUTO: 0.04 10*3/MM3 (ref 0–0.2)
BASOPHILS NFR BLD AUTO: 0.6 % (ref 0–1.5)
BILIRUB SERPL-MCNC: 0.4 MG/DL (ref 0–1.2)
BUN SERPL-MCNC: 7 MG/DL (ref 6–20)
BUN/CREAT SERPL: 8 (ref 7–25)
CALCIUM SERPL-MCNC: 9.3 MG/DL (ref 8.6–10.5)
CHLORIDE SERPL-SCNC: 95 MMOL/L (ref 98–107)
CHOLEST SERPL-MCNC: 129 MG/DL (ref 0–200)
CK SERPL-CCNC: 719 U/L (ref 20–200)
CO2 SERPL-SCNC: 26.1 MMOL/L (ref 22–29)
CREAT SERPL-MCNC: 0.87 MG/DL (ref 0.76–1.27)
EGFRCR SERPLBLD CKD-EPI 2021: 103.8 ML/MIN/1.73
EOSINOPHIL # BLD AUTO: 0.13 10*3/MM3 (ref 0–0.4)
EOSINOPHIL NFR BLD AUTO: 2 % (ref 0.3–6.2)
ERYTHROCYTE [DISTWIDTH] IN BLOOD BY AUTOMATED COUNT: 12.5 % (ref 12.3–15.4)
FOLATE SERPL-MCNC: 5.59 NG/ML (ref 4.78–24.2)
GLOBULIN SER CALC-MCNC: 2.2 GM/DL
GLUCOSE SERPL-MCNC: 99 MG/DL (ref 65–99)
HBA1C MFR BLD: 5.8 % (ref 4.8–5.6)
HCT VFR BLD AUTO: 46.4 % (ref 37.5–51)
HDLC SERPL-MCNC: 51 MG/DL (ref 40–60)
HGB BLD-MCNC: 15 G/DL (ref 13–17.7)
IMM GRANULOCYTES # BLD AUTO: 0.01 10*3/MM3 (ref 0–0.05)
IMM GRANULOCYTES NFR BLD AUTO: 0.2 % (ref 0–0.5)
LDLC SERPL CALC-MCNC: 66 MG/DL (ref 0–100)
LDLC/HDLC SERPL: 1.31 {RATIO}
LYMPHOCYTES # BLD AUTO: 1.93 10*3/MM3 (ref 0.7–3.1)
LYMPHOCYTES NFR BLD AUTO: 30.2 % (ref 19.6–45.3)
MCH RBC QN AUTO: 27.5 PG (ref 26.6–33)
MCHC RBC AUTO-ENTMCNC: 32.3 G/DL (ref 31.5–35.7)
MCV RBC AUTO: 85.1 FL (ref 79–97)
MONOCYTES # BLD AUTO: 0.61 10*3/MM3 (ref 0.1–0.9)
MONOCYTES NFR BLD AUTO: 9.5 % (ref 5–12)
NEUTROPHILS # BLD AUTO: 3.68 10*3/MM3 (ref 1.7–7)
NEUTROPHILS NFR BLD AUTO: 57.5 % (ref 42.7–76)
NRBC BLD AUTO-RTO: 0 /100 WBC (ref 0–0.2)
PLATELET # BLD AUTO: 241 10*3/MM3 (ref 140–450)
POTASSIUM SERPL-SCNC: 4.5 MMOL/L (ref 3.5–5.2)
PROT SERPL-MCNC: 6.8 G/DL (ref 6–8.5)
RBC # BLD AUTO: 5.45 10*6/MM3 (ref 4.14–5.8)
SODIUM SERPL-SCNC: 133 MMOL/L (ref 136–145)
TRIGL SERPL-MCNC: 56 MG/DL (ref 0–150)
VIT B12 SERPL-MCNC: 821 PG/ML (ref 211–946)
VLDLC SERPL CALC-MCNC: 12 MG/DL (ref 5–40)
WBC # BLD AUTO: 6.4 10*3/MM3 (ref 3.4–10.8)

## 2025-04-07 ENCOUNTER — OFFICE VISIT (OUTPATIENT)
Dept: FAMILY MEDICINE CLINIC | Facility: CLINIC | Age: 55
End: 2025-04-07
Payer: MEDICARE

## 2025-04-07 VITALS
DIASTOLIC BLOOD PRESSURE: 78 MMHG | TEMPERATURE: 98.4 F | HEART RATE: 74 BPM | SYSTOLIC BLOOD PRESSURE: 106 MMHG | WEIGHT: 247 LBS | OXYGEN SATURATION: 95 % | BODY MASS INDEX: 36.58 KG/M2 | HEIGHT: 69 IN | RESPIRATION RATE: 16 BRPM

## 2025-04-07 DIAGNOSIS — E78.5 HYPERLIPIDEMIA LDL GOAL <70: ICD-10-CM

## 2025-04-07 DIAGNOSIS — E53.8 B12 DEFICIENCY: ICD-10-CM

## 2025-04-07 DIAGNOSIS — E55.9 VITAMIN D DEFICIENCY: ICD-10-CM

## 2025-04-07 DIAGNOSIS — S21.201A WOUND OF RIGHT SIDE OF BACK, INITIAL ENCOUNTER: ICD-10-CM

## 2025-04-07 DIAGNOSIS — R79.89 ELEVATED LIVER FUNCTION TESTS: ICD-10-CM

## 2025-04-07 DIAGNOSIS — R74.8 ELEVATED CK: ICD-10-CM

## 2025-04-07 DIAGNOSIS — I10 ESSENTIAL HYPERTENSION: Primary | ICD-10-CM

## 2025-04-07 DIAGNOSIS — R73.03 PREDIABETES: ICD-10-CM

## 2025-04-07 RX ORDER — PRAVASTATIN SODIUM 40 MG
40 TABLET ORAL DAILY
COMMUNITY

## 2025-04-07 RX ORDER — EZETIMIBE 10 MG/1
10 TABLET ORAL DAILY
Qty: 90 TABLET | Refills: 1 | Status: SHIPPED | OUTPATIENT
Start: 2025-04-07

## 2025-04-07 NOTE — PROGRESS NOTES
Subjective   Gaurav Mora is a 54 y.o. male  who presents today in follow up of wound on his buttock with dizziness and hypotension with hypertension, hyperlipidemia, vitamin D deficiency, B12 deficiency, elevated CK, elevated LFT, SHANE, seizure disorder, history of intracranial hemorrhage.       6 month checkup  Pertinent negative symptoms include no abdominal pain, no anorexia, no joint pain, no change in stool, no chest pain, no chills, no congestion, no cough, no diaphoresis, no fatigue, no fever, no headaches, no joint swelling, no myalgias, no nausea, no neck pain, no numbness, no rash, no sore throat, no swollen glands, no dysuria, no vertigo, no visual change, no vomiting and no weakness.   Treatment and/or Medications comments include: None   Additional information: None      Wound right buttock-Wound is improving-almost healed.  They continue to do dressing changes and bacitracin twice daily.    Finished doxycycline.  Noticed wound on right low back that started with yellow pus and dead skin. They have been using Neosporin. Cannot feel it much- irritated with bandage. May be improving.     Dizziness, orthostatic hypotension- has not had recurrence.   Symptoms started with elevated /82 and had lay down - 123/72. Then he got up to go to the bathroom, he was dizzy and went to urgent care. It was about 4 hours into episode when he went to urgent care. He was sweating and clammy and having dizziness. With elevated BP, he was transported to ER by ambulance.   He was then seen at the emergency department 6/1/2023 with blood pressure 154/108 and complained of lightheadedness, dizziness, elevated blood pressure.  Advised to go to the ER.  She went to the emergency department where they noted sudden onset dizziness that started about 3 PM-dizziness characterized as off-balance feeling.  He denied spinning dizziness.  Symptoms were worse with head movement and walking, improved with rest.  ER  labs-high-sensitivity troponin 33, delta troponin 9, creatinine 0.92, glucose 197, WBC 9.5, hemoglobin 14.6, platelets 237.  Chest x-ray negative for acute infiltration.  CT head without acute intracranial abnormality.  Symptoms were thought to be from peripheral source but cardiology and neurology were consulted.  He was admitted to observation.  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  Head CT unremarkable, MRI unremarkable, TTE unremarkable, orthostatic vital signs ordered and PT for Epley maneuver per Dr. Barriga Borte.   Cardiology consultation-initial troponin was 33 with repeat increased to 41 then 40.  He denied chest pain and SOA.  They were concerned about hypertension due to history of stroke secondary to hypertension.  Dizziness was thought to be likely vertigo.  Nonspecific at bedtime troponin elevation.  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  At bedtime troponin was not considered to be significant-not thought to be type I or type II NSTEMI.  Asymptomatic without angina.  Light murmur on exam-echo without wall motion abnormality and normal EF.  Aortic valve was not ideally visualized although there was some calcification-thought to be the origination of the murmur-no aortic stenosis.  Did not feel dizziness was from cardiac source.  With blood pressure returning to normal in the spittle, blood pressure regimen was not changed.  Continue labetalol, lisinopril, amlodipine, and spironolactone.  Continue pravastatin and aspirin.  He was seen by cardiology in follow-up 6/9/2023.  They did not make changes to blood pressure or cholesterol regimen.  Aorta was noted to be stable.  Coronary calcification noted on CT and elevated troponin without anginal symptoms and already on appropriate medical therapy-no change to regimen.  Advised  "follow-up with Dr. Coates \"as scheduled\" in January 2024.  No appointment is scheduled.  Blood pressure has been ok at home since discharge. He continues to have some dizziness- he has no spinning dizziness and still having light-headedness intermittent. If sitting on couch, he may have while sitting. He also has sometimes when he changes positions. He started with positional dizziness about 1 year ago.   Overall, feeling a little better but still feeling \"off\".   Heart murmur, very small thoracic aortic aneurysm dilated aortic root, aortic calcification, coronary artery disease on CT, LBBB-following with cardiology.  Up-to-date with appointment.  They stopped aspirin.  3/2023-he denied CP, SOA, syncope, palpitations. [facility] They advised he follow up here for us to order Echo. They dad not contacted his cardiologist for this because they were advised to follow up here.   He did have previous aortic root and ascending aortic dilitation.   I advised a follow-up with cardiology for consideration of echo and that I would order CTA chest and follow-up.  CTA chest with thoracic aorta 3.5 cm at sinus of Valsalva, 3.9 cm pulmonary artery, 2.4 cm aortic arch and 2.2 cm at the left atrium.  Coronary artery calcifications noted.  Right-sided gynecomastia.  I sent to his cardiologist-they advised they were following him very slight aneurysm.  Hypertension- patient has been stable on Norvasc 10 mg once daily, labetalol 100 mg 3 x daily, lisinopril 20 mg once daily, and spironolactone 25 mg once daily. BP at home- 110s-120s/70s-80s.   Hyperlipidemia- taking pravastatin and tolerating ok.  Neurology recommended adding Zetia to pravastatin.  [facility]. [facility] provider- Dr Swain reported bad cholesterol was elevated at 118. She suggested increasing pravastatin to 80 mg 3/4/2024- he already increased. He will go back in 6 weeks to recheck. Will go back 4/3/2024.  He is having " trouble getting the [facility].  He previously had to stop statin due to significantly elevated CK as well as elevated LFT.    Had not been rechecked since 11/2019.   Talked to neurology- cardiology tried to send in Repatha and Praluent- not approved. Neurology is still recommending and will talk with cardiology to see if they can work together to get approved.   Prediabetes-on no medications.  Vitamin D deficiency- taking 5000 IU daily.   Previously- vitamin D 50,000 IU once weekly.  B12 deficiency-not taking B12 currently.  Elevated CK- following with neurology and rheumatology.   Elevated LFT- normal since 2019    SHANE- continues CPAP and follow up with sleep medicine.   History of hemorrhagic CVA 11/2017- he has been stable without new deficits or any new concerns. Following with neurology.   He previously had itching in groin area and sometimes redness and flaking x months intermittent.  Seizure- no seizures while on medication.   Patient was hospitalized 4/28/2022-4/29/2022 for witnessed tonic clonic seizure with post-ictal state. Neurology was consulted and he was recommended to Keppra with MRI brain and EEG. Neurology thought seizure was secondary to temporal lobe hemorrhagic CVA.   CT head with temporal horm left > right.   MRI brain- 4/28/2022 with extensive atrophy and hemosiderin deposition left temporal lobe from previous hemorrhage. Hyperintensity left cerebral hemisphere- thought to be from bleed.  EEG- abnormal- intermittent left temporal slowing and reversals concerning for left temporal spike and polyspike complexes. Mild diffuse slowing from mild encephalopathy. Focal cortical irritability- epileptogenic potential- suggestive but not diagnostic of seizure d/o. 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       Patient's Specialists:  Cardiology- Dr. Bradford- last appt 6/2024 for hypertension, hyperlipidemia, dilated ascending aorta, aortic calcification, systolic murmur, and history  of hemorrhagic CVA, and elevated CK.  Stopped aspirin 81 mg.  Follow-up 1 year.  They tried to get Praluent and Repatha approved without success. Repeat scan for pulmonary nodule 3/2021- Aorta 3.8.  Aorta was stable on CTA chest 5/2023.  Coronary artery calcification without anginal symptoms and already on appropriate medical therapy with aspirin and pravastatin.  No further testing recommended. Follow up 1/2024.   General surgery-   Dr. Jeronimo Paul-last colonoscopy 2/2022 with benign polyp.  Recheck 5 years.  Dr Paulina Nina- last appt 8/2020 for muscle biopsy with elevated CK. Abnormal. Advised return to rheumatology  Nephrology- PENNY Ambrocio- last appt 6/2018 for htn, his hemorrhagic CVA, SHANE, right homonymous hemianopsia.    Neurology- Dr Garner-  Last appt 7/2024 for seizures- partial with secondary generalization and complex partial seizures, sequelae of left putaminal hemorrhage, stable without new symptoms, hypertension, hyperlipidemia, elevated CK and ALT.check CK and CMP with increased pravastatin.  Continue oxcarbazepine 600 mg twice daily, blood pressure goal <130/80, B12 > 500, TSH in normal range, LDL <70, and A1c <6.5%.  Follow-up 6 months.  They wanted to review myositis panel from Dr. Jessie Garner's office.  Continue risk factor control-lipids may be improved by Zetia-advised to discuss further with PCP.  Keppra 1000 mg BID with side effects. Continue oxcarbazepine 600 mg BID. If he developed proximal weakness, pursue an EMG.    Ophthalmology- Dr Armando- last appt 4/2018 following hemorrhagic CVA with chronic dissection left vertebral artery with complete right homonymous hemianopsia.   PM&R- Connie Haase, APRN- last appt 6/2018 with hemorrhagic CVA, homonymous hemiopsia, and aphasia.   Sleep medicine- Aysha Zarate, APRSANTOS- last appt 11/2023 for SHANE. Compliant with CPAP. Stable. Follow up in 1 year.   Thoracic surgery- Dr Palomares, Destiney Campbell, APRSANTOS- last appt 3/2021 for  pulmonary nodule. Last CT chest 3/2021 for pulmonary nodule- stable. Advised benign granulomatous disease. No need for follow up. Advised constant throat clearing- thought could be related to Lisinopril and to discuss with PCP. Follow up PRN.    Pulmonology- Dr Garcia- last appt 11/22/2019 for SHANE and pulmonary nodule. Stable and follow up with thoracic surgery as directed. Follow up with pulmonology in 1 year.   Rheumatology- Dr Laura Garner- last appt 9/2022 for elevated CK- muscle biopsy -type 1 fiber hypertrophy. Negative Aldolase- no signs or abnormalities for myositis, MRI negative for myositis. Advised following with neurology for monitoring annually due to likely neuromuscular etiology. Asymptomatic. Follow up PRN.   Urology- Dr. Marie- last appt 10/2019 for microhematuria- CT abd/pelvis and cystoscopy was negative. Follow up in 1 year.    The following portions of the patient's history were reviewed and updated as appropriate: allergies, current medications, past family history, past medical history, past social history, past surgical history and problem list.    Review of Systems   Constitutional:  Negative for chills, diaphoresis, fatigue and fever.   HENT:  Negative for congestion and sore throat.    Respiratory:  Negative for cough.    Cardiovascular:  Negative for chest pain.   Gastrointestinal:  Negative for abdominal pain, anorexia, nausea and vomiting.   Genitourinary:  Negative for dysuria.   Musculoskeletal:  Negative for joint pain, myalgias and neck pain.   Skin:  Negative for rash.   Neurological:  Negative for vertigo, weakness, numbness and headaches.       Objective      Vitals:    04/07/25 1521   BP: 106/78   Pulse: 74   Resp: 16   Temp: 98.4 °F (36.9 °C)   SpO2: 95%       Body mass index is 36.46 kg/m².   Class 2 Severe Obesity (BMI >=35 and <=39.9). Obesity-related health conditions include the following: obstructive sleep apnea, hypertension, impaired fasting glucose, and  dyslipidemias. Obesity is unchanged. BMI is is above average; BMI management plan is completed. We discussed portion control and increasing exercise.    Physical Exam  Vitals and nursing note reviewed.   Constitutional:       Appearance: He is well-developed.   HENT:      Head: Normocephalic and atraumatic.      Right Ear: External ear normal.      Left Ear: External ear normal.   Eyes:      Conjunctiva/sclera: Conjunctivae normal.   Neck:      Thyroid: No thyroid mass or thyromegaly.      Vascular: No carotid bruit.      Trachea: No tracheal deviation.   Cardiovascular:      Rate and Rhythm: Normal rate and regular rhythm.      Heart sounds: Normal heart sounds.   Pulmonary:      Effort: Pulmonary effort is normal.      Breath sounds: Normal breath sounds.   Skin:     General: Skin is warm and dry.          Neurological:      Mental Status: He is alert and oriented to person, place, and time.      Gait: Gait normal.   Psychiatric:         Behavior: Behavior normal.         Thought Content: Thought content normal.       Assessment & Plan   Diagnoses and all orders for this visit:    1. Essential hypertension (Primary)    2. Hyperlipidemia LDL goal <70  -     ezetimibe (ZETIA) 10 MG tablet; Take 1 tablet by mouth Daily.  Dispense: 90 tablet; Refill: 1    3. Prediabetes    4. Vitamin D deficiency    5. B12 deficiency    6. Elevated CK    7. Elevated liver function tests    8. Wound of right side of back, initial encounter  -     Ambulatory Referral to Wound Clinic        Assessment and Plan  I reviewed lab results with patient in office today.  Follow-up in 6 months with fasting labs prior.    Wound, right back- He developed a wound on the right side of his low back that appears to be ulceration versus bedsore type wound.  He denied trauma or laying in bed a lot.  However, he is unable to feel the right side and was leaning in that direction while sitting.  I have advised that he avoid sitting to his right side due to  decreased sensation and increased risk for pressure ulcers. I gave short course of doxycycline 100 mg twice daily for 10 days.  He had significant improvement in wound with continued wound care at home with bacitracin twice daily and keep bandaged, however, wound has not resolved.  I tried Santyl ointment to see if this will heal completely.  They have had improvement but no resolution of wound.  I will refer to wound care for further evaluation and treatment.  Aortic root and ascending aorta dilation, heart murmur- Patient to continue follow-up with cardiology as directed by them.   Hypertension, orthostatic hypotension- Blood pressure today is stable today. Continue Lisinopril 20 mg once daily, amlodipine 10 mg once daily, Labetalol 100 mg 3 times daily, and spironolactone 25 mg once daily per cardiology. Monitor BP and call if any elevated BP > 135/85 or any recurrence of dizziness or hypotension.   Hyperlipidemia- Patient had to stop his statin due to significantly elevated CK and LFT. Cardiology has tried to get approval for Praluent and Repatha without success. He never experienced any muscle pain or weakness or felt any changes with stopping medication. He has been seen by rheumatology. To follow as directed by neurology with CK.  Neurology recommended adding Zetia to pravastatin 40 mg daily if elevated LDL.  However[facility] and [facility] P[facility]atin from 40 mg to 80 mg daily then decreased back to 40 mg, however, he is having trouble getting his Zetia.  I will send in Zetia today.  CK has remained significantly elevated but stable and cholesterol has improved significantly.  Continue medication at current dose.      Vitamin D deficiency- Vitamin D level looks good.  Continue vitamin D 5000 IU daily.   B12 deficiency- Patient to take B12 500 mcg daily.  I will recheck at follow-up.  Elevated CK- From review of rheumatology records, neurology may be best to continue to monitor, follow up,  and determine any further workup or treatment in the future.  Level remains significantly elevated but stable.  Elevated liver function tests- Liver function testing was briefly elevated and returned to normal 2019. I will continue to monitor.   Hyponatremia- Very slightly low sodium that is very stable.  This could be from spironolactone.  We will continue to monitor and consider specialist if needed.  SHANE- Continue CPAP and follow up with sleep medicine as directed by them.   History of hemorrhagic CVA- He had a significant hemorrhagic CVA due to uncontrolled BP with residual deficits in 11/2017. His baseline prior to CVA is unknown to me, as I did not know him prior to CVA. He is doing well without CP, SOA, Palp, neuro symptoms, or other cardiovascular or cerebrovascular complaints. However, he has stiffness and some decreased physical ability.  I previously referred for physical therapy.   Seizure disorder- Patient has developed seizures that have been attributed to likely scar tissue or damage from previous hemorrhagic CVA. Continue medication and follow up per neurology.    From [facility] 1/20[facility].  He is up-to-date on colonoscopy.  He is up-to-date on all immunizations.  He will check with his pharmacy to see if RSV is covered and will update this if it is.  With seizure treatment, we could consider DEXA in the future.    Patient continues to see specialists as noted above.  I have reviewed available records, including consult notes, labs, and imaging/testing.  Patient to continue follow-up with specialists as directed by them.     I spent 35 minutes caring for Gaurav Mora on this date of service. This time includes time spent by me in the following activities as necessary: preparing for the visit, reviewing tests, specialists records and previous visits, obtaining and/or reviewing a separately obtained history, performing a medically appropriate exam and/or evaluation, counseling and educating the patient,  family, caregiver, referring and/or communicating with other healthcare professionals, documenting information in the medical record, independently interpreting results and communicating that information with the patient, family, caregiver, and developing a medically appropriate treatment plan with consideration of other conditions, medications, and treatments.

## 2025-04-09 ENCOUNTER — OFFICE VISIT (OUTPATIENT)
Dept: WOUND CARE | Facility: HOSPITAL | Age: 55
End: 2025-04-09
Payer: MEDICARE

## 2025-04-18 ENCOUNTER — OFFICE VISIT (OUTPATIENT)
Dept: WOUND CARE | Facility: HOSPITAL | Age: 55
End: 2025-04-18
Payer: MEDICARE

## 2025-04-18 PROCEDURE — G0463 HOSPITAL OUTPT CLINIC VISIT: HCPCS

## 2025-05-08 RX ORDER — LABETALOL 100 MG/1
100 TABLET, FILM COATED ORAL 3 TIMES DAILY
Qty: 270 TABLET | Refills: 1 | Status: SHIPPED | OUTPATIENT
Start: 2025-05-08

## 2025-06-04 ENCOUNTER — TELEPHONE (OUTPATIENT)
Dept: FAMILY MEDICINE CLINIC | Facility: CLINIC | Age: 55
End: 2025-06-04
Payer: MEDICARE

## 2025-06-04 DIAGNOSIS — Z01.84 IMMUNITY STATUS TESTING: Primary | ICD-10-CM

## 2025-06-04 NOTE — TELEPHONE ENCOUNTER
Per father, they are going on International Travel to [place] and they recommended they ensure up-to-date on measles vaccine.

## 2025-06-07 LAB
MEV IGG SER IA-ACNC: >300 AU/ML
MUV IGG SER IA-ACNC: 235 AU/ML
RUBV IGG SERPL IA-ACNC: 3.82 INDEX

## 2025-06-16 ENCOUNTER — OFFICE VISIT (OUTPATIENT)
Age: 55
End: 2025-06-16
Payer: MEDICARE

## 2025-06-16 VITALS
HEART RATE: 63 BPM | DIASTOLIC BLOOD PRESSURE: 62 MMHG | HEIGHT: 69 IN | WEIGHT: 245 LBS | SYSTOLIC BLOOD PRESSURE: 100 MMHG | BODY MASS INDEX: 36.29 KG/M2

## 2025-06-16 DIAGNOSIS — I10 ESSENTIAL HYPERTENSION: Primary | ICD-10-CM

## 2025-06-16 DIAGNOSIS — E78.5 HYPERLIPIDEMIA LDL GOAL <70: ICD-10-CM

## 2025-06-16 PROCEDURE — 1159F MED LIST DOCD IN RCRD: CPT | Performed by: NURSE PRACTITIONER

## 2025-06-16 PROCEDURE — 3078F DIAST BP <80 MM HG: CPT | Performed by: NURSE PRACTITIONER

## 2025-06-16 PROCEDURE — 93000 ELECTROCARDIOGRAM COMPLETE: CPT | Performed by: NURSE PRACTITIONER

## 2025-06-16 PROCEDURE — 1160F RVW MEDS BY RX/DR IN RCRD: CPT | Performed by: NURSE PRACTITIONER

## 2025-06-16 PROCEDURE — 99214 OFFICE O/P EST MOD 30 MIN: CPT | Performed by: NURSE PRACTITIONER

## 2025-06-16 PROCEDURE — 3074F SYST BP LT 130 MM HG: CPT | Performed by: NURSE PRACTITIONER

## 2025-06-16 RX ORDER — LISINOPRIL 10 MG/1
10 TABLET ORAL DAILY
Qty: 90 TABLET | Refills: 0 | Status: SHIPPED | OUTPATIENT
Start: 2025-06-16

## 2025-06-16 NOTE — PROGRESS NOTES
Date of Office Visit: 2025  Encounter Provider: JUAN DANIEL Dow  Place of Service: [facility] CARDIOLOGY  Patient Name: Gaurav Mora  :1970    Chief Complaint   Patient presents with    Essential hypertension     1 year follow  up     :     HPI: Gaurav Mora is a 54 y.o. male patient of Dr. Coates's with hypertension, a chronic left bundle branch block, mild ascending aortic aneurysm, and obstructive sleep apnea.  Additionally, he has a chronically elevated CK (over 1000 in the past) which has been worked up extensively.  No significant pathology has been identified.    In 2017, he suffered an extensive hemorrhagic CVA due to hypertension.      He was last seen in the office by Dr. Coates in 2024 at which time he was doing well.  Dr. Coates recommended discontinuing the aspirin secondary to his history of hemorrhagic stroke.  History of hemorrhagic stroke.  Otherwise, he was advised to follow-up in 1 year.    Overall he has been doing well.  He denies any chest pain, shortness of breath, palpitations, edema, or syncope.  He has been a little dizzy.    Past Medical History:   Diagnosis Date    Elevated CK     Environmental allergies     Headache, tension-type     Heart murmur 3/15/23    Hemorrhagic cerebrovascular accident (CVA)     Hemorrhagic stroke 2017    Residual peripheral vision loss, right-sided sensation loss    History of bladder infections 2017    Hyperlipidemia     Hypertension     Lung nodule     New onset seizure 2022    SHANE on CPAP     WEARS CPAP    PONV (postoperative nausea and vomiting)     Seizure 2022    Stroke 2017       Past Surgical History:   Procedure Laterality Date    APPENDECTOMY N/A     COLONOSCOPY N/A 2022    Procedure: COLONOSCOPY into  cecum with cold bx polypectomy;  Surgeon: Jeronimo Paul Jr., MD;  Location: [facility] ENDOSCOPY;  Service: General;  Laterality: N/A;  pre: screen  post: polyp      MUSCLE BIOPSY Left 08/19/2020    Procedure: quadriceps muscle biopsy;  Surgeon: Paulina Nina MD;  Location: [facility] OR;  Service: General;  Laterality: Left;       Social History     Socioeconomic History    Marital status: Single    Number of children: 0   Tobacco Use    Smoking status: Never     Passive exposure: Never    Smokeless tobacco: Never    Tobacco comments:     minimal caffeine   Vaping Use    Vaping status: Never Used   Substance and Sexual Activity    Alcohol use: Not Currently     Alcohol/week: 1.0 standard drink of alcohol     Types: 1 Cans of beer per week     Comment: This is rare.    Drug use: Never    Sexual activity: Never       Family History   Problem Relation Age of Onset    Breast cancer Mother     Cancer Mother     Atrial fibrillation Father     Hypertension Father     Skin cancer Father     Colon cancer Paternal Grandmother     Cancer Paternal Grandmother     Heart disease Maternal Grandfather     Parkinsonism Maternal Grandfather     Heart disease Maternal Aunt     Heart disease Maternal Uncle     Heart disease Maternal Grandmother     Malig Hyperthermia Neg Hx        Review of Systems   Constitutional: Negative.   Cardiovascular: Negative.  Negative for chest pain, dyspnea on exertion, leg swelling, orthopnea, paroxysmal nocturnal dyspnea and syncope.   Respiratory: Negative.     Hematologic/Lymphatic: Negative for bleeding problem.   Musculoskeletal:  Negative for falls.   Gastrointestinal:  Negative for melena.   Neurological:  Positive for dizziness. Negative for light-headedness.       No Known Allergies      Current Outpatient Medications:     acetaminophen (TYLENOL) 325 MG tablet, Take 2 tablets by mouth Every 4 (Four) Hours As Needed for Mild Pain., Disp: , Rfl:     amLODIPine (NORVASC) 10 MG tablet, TAKE 1 TABLET DAILY, Disp: 90 tablet, Rfl: 3    bacitracin 500 UNIT/GM ointment, Apply 1 Application topically to the appropriate area as directed 2 (Two) Times a Day., Disp:  "28 g, Rfl: 1    collagenase (Santyl) 250 UNIT/GM ointment, Apply 1 Application topically to the appropriate area as directed Daily., Disp: 30 g, Rfl: 1    ezetimibe (ZETIA) 10 MG tablet, Take 1 tablet by mouth Daily., Disp: 90 tablet, Rfl: 1    ipratropium (ATROVENT) 0.06 % nasal spray, , Disp: , Rfl:     labetalol (NORMODYNE) 100 MG tablet, TAKE 1 TABLET THREE TIMES A DAY, Disp: 270 tablet, Rfl: 1    levocetirizine (XYZAL) 5 MG tablet, Take 1 tablet by mouth Every Evening., Disp: , Rfl:     lisinopril (PRINIVIL,ZESTRIL) 10 MG tablet, Take 1 tablet by mouth Daily., Disp: 90 tablet, Rfl: 0    montelukast (SINGULAIR) 10 MG tablet, , Disp: , Rfl:     omeprazole (priLOSEC) 20 MG capsule, Take 1 capsule by mouth Daily., Disp: , Rfl:     OXcarbazepine (TRILEPTAL) 600 MG tablet, Take 1 tablet by mouth 2 (Two) Times a Day., Disp: 180 tablet, Rfl: 0    pravastatin (PRAVACHOL) 40 MG tablet, Take 1 tablet by mouth Daily., Disp: , Rfl:     spironolactone (ALDACTONE) 25 MG tablet, TAKE 1 TABLET DAILY, Disp: 90 tablet, Rfl: 3    vitamin B-12 (CYANOCOBALAMIN) 500 MCG tablet, Take 1 tablet by mouth Daily., Disp: 90 tablet, Rfl: 1    vitamin D3 125 MCG (5000 UT) capsule capsule, Take 1 capsule by mouth Daily., Disp: , Rfl:       Objective:     Vitals:    06/16/25 1321   BP: 100/62   Pulse: 63   Weight: 111 kg (245 lb)   Height: 175.3 cm (69.02\")     Body mass index is 36.16 kg/m².    PHYSICAL EXAM:    Neck:      Vascular: No JVD.   Pulmonary:      Effort: Pulmonary effort is normal.      Breath sounds: Normal breath sounds.   Cardiovascular:      Normal rate. Regular rhythm.      Murmurs: There is no murmur.      No gallop.  No click. No rub.   Pulses:     Intact distal pulses.           ECG 12 Lead    Date/Time: 6/16/2025 1:40 PM  Performed by: Yarelis Ocampo APRN    Authorized by: Yarelis Ocampo APRN  Comparison: compared with previous ECG from 6/9/2023  Similar to previous ECG  Rhythm: sinus rhythm  Ectopy: unifocal " PVCs  Rate: normal  BPM: 63  Conduction: left bundle branch block            Assessment:       Diagnosis Plan   1. Essential hypertension  ECG 12 Lead    lisinopril (PRINIVIL,ZESTRIL) 10 MG tablet      2. Hyperlipidemia LDL goal <70          Orders Placed This Encounter   Procedures    ECG 12 Lead     This order was created via procedure documentation     Release to patient:   Routine Release [[id number]]          Plan:       1.  Hypertension.  His blood pressure is actually quite low.  He has not a lot of antihypertensive medications.  Recommended reducing the lisinopril dose to 10 mg.      2.  Hyperlipidemia.  Continue pravastatin and Zetia      Overall I think he is doing well.  He will follow-up with Dr. Coates in 1 year.      As always, it has been a pleasure to participate in your patient's care.      Sincerely,         JUAN DANIEL Cat

## 2025-07-23 NOTE — PROGRESS NOTES
NAME: Gaurav Mora   : 1970    Chief Complaint   Patient presents with    History of stroke        HPI:  Gaurav Mora is a 55 y.o. right-handed male who I am seeing in follow-up regarding history of hemorrhagic stroke and subsequent complex partial seizures as well as elevated CK level.  He is accompanied by his father who adds to the history.  He has a past medical history of  hypertension, hyperlipidemia and SHANE (CPAP).  I last saw him on 2025, with the following history taken from that note with additions/modifications as indicated:    In , patient had a hemorrhagic stroke with right-sided weakness, visual disturbance and aphasia.  His stroke was felt to be from uncontrolled hypertension.  His hypertension is treated with 4 drugs and hyperlipidemia who had notable elevation of CK on a statin, which did not fully resolve after stopping the statin.  In , patient presented to the ER for a seizure.  Keppra was initiated but he developed agitation and behavioral changes and was switched to oxcarbazepine at 600 mg twice daily.  He has not had any recurrent seizures and his behavior has been stable.     Patient has an elevated CK which has ranged generally from 500-900's.  It does not seem to make much difference if he is on a statin or not.  He was on rosuvastatin which was stopped for a year and then placed on pravastatin.  He does not have any complaints of weakness or muscle pain or cramping.  He had been seen by Dr. 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  The enzyme testing demonstrated no abnormalities and there was no excessive accumulation of glycogen.  No ragged red fibers.      Myositis 3 panel from 2020 was negative done through [facility] office.  Specifically, the following  antibodies were tested: EJ, Marycarmen-1 (WB), Ku, Mi-2 antibodies, OJ, PL-12, PL-7, PM-Scl 100, PM-Scl 75, RNP, RO-52, signal recognition particle.     The patient was hospitalized 6/2023 and seen by Dr. Mesa of neurology and he had an MRI of the brain and duplex carotid study..  He was seen for dizziness of vertiginous type thought to be inner ear.  MRI of the brain showed no acute changes and the duplex carotid study showed less than 50% carotid stenosis and the vertebral flow was antegrade bilaterally.    History interim    Patient remains well-controlled on oxcarbazepine 600 mg twice a day without any side effects.  He denies any seizures or strokelike symptoms.  His pravastatin was reduced by his PCP to 40 mg daily.  He denies any side effects.  He denies any cramping or muscle pain.  He denies any recent falls.  He again continues to go to the gym 2-3 times a week and rides a stationary bike for 45 minutes.    Of note, they leave for [place] next month for 2 weeks.       Past Medical History:   Diagnosis Date    Elevated CK     Environmental allergies     Headache, tension-type     Heart murmur 3/15/23    Hemorrhagic cerebrovascular accident (CVA)     Hemorrhagic stroke 11/24/2017    Residual peripheral vision loss, right-sided sensation loss    History of bladder infections 12/2017    Hyperlipidemia     Hypertension     Lung nodule     New onset seizure 04/28/2022    SHANE on CPAP     WEARS CPAP    PONV (postoperative nausea and vomiting)     Seizure 04/28/2022    Stroke 2017         Past Surgical History:   Procedure Laterality Date    APPENDECTOMY N/A 1987    COLONOSCOPY N/A 02/17/2022    Procedure: COLONOSCOPY into  cecum with cold bx polypectomy;  Surgeon: Jeronimo Paul Jr., MD;  Location: [facility] ENDOSCOPY;  Service: General;  Laterality: N/A;  pre: screen  post: polyp     MUSCLE BIOPSY Left 08/19/2020    Procedure: quadriceps muscle biopsy;  Surgeon: Paulina Nina MD;  Location: [facility] MAIN OR;  Service:  General;  Laterality: Left;           Current Outpatient Medications:     acetaminophen (TYLENOL) 325 MG tablet, Take 2 tablets by mouth Every 4 (Four) Hours As Needed for Mild Pain., Disp: , Rfl:     amLODIPine (NORVASC) 10 MG tablet, TAKE 1 TABLET DAILY, Disp: 90 tablet, Rfl: 3    bacitracin 500 UNIT/GM ointment, Apply 1 Application topically to the appropriate area as directed 2 (Two) Times a Day., Disp: 28 g, Rfl: 1    collagenase (Santyl) 250 UNIT/GM ointment, Apply 1 Application topically to the appropriate area as directed Daily., Disp: 30 g, Rfl: 1    ezetimibe (ZETIA) 10 MG tablet, Take 1 tablet by mouth Daily., Disp: 90 tablet, Rfl: 1    ipratropium (ATROVENT) 0.06 % nasal spray, , Disp: , Rfl:     labetalol (NORMODYNE) 100 MG tablet, TAKE 1 TABLET THREE TIMES A DAY, Disp: 270 tablet, Rfl: 1    levocetirizine (XYZAL) 5 MG tablet, Take 1 tablet by mouth Every Evening., Disp: , Rfl:     lisinopril (PRINIVIL,ZESTRIL) 10 MG tablet, Take 1 tablet by mouth Daily., Disp: 90 tablet, Rfl: 0    montelukast (SINGULAIR) 10 MG tablet, , Disp: , Rfl:     omeprazole (priLOSEC) 20 MG capsule, Take 1 capsule by mouth Daily., Disp: , Rfl:     OXcarbazepine (TRILEPTAL) 600 MG tablet, Take 1 tablet by mouth 2 (Two) Times a Day., Disp: 180 tablet, Rfl: 0    pravastatin (PRAVACHOL) 40 MG tablet, Take 1 tablet by mouth Daily., Disp: , Rfl:     spironolactone (ALDACTONE) 25 MG tablet, TAKE 1 TABLET DAILY, Disp: 90 tablet, Rfl: 3    vitamin B-12 (CYANOCOBALAMIN) 500 MCG tablet, Take 1 tablet by mouth Daily., Disp: 90 tablet, Rfl: 1    vitamin D3 125 MCG (5000 UT) capsule capsule, Take 1 capsule by mouth Daily., Disp: , Rfl:       Family History   Problem Relation Age of Onset    Breast cancer Mother     Cancer Mother     Atrial fibrillation Father     Hypertension Father     Skin cancer Father     Colon cancer Paternal Grandmother     Cancer Paternal Grandmother     Heart disease Maternal Grandfather     Parkinsonism Maternal  "Grandfather     Heart disease Maternal Aunt     Heart disease Maternal Uncle     Heart disease Maternal Grandmother     Malig Hyperthermia Neg Hx          Social History     Socioeconomic History    Marital status: Single    Number of children: 0   Tobacco Use    Smoking status: Never     Passive exposure: Never    Smokeless tobacco: Never    Tobacco comments:     minimal caffeine   Vaping Use    Vaping status: Never Used   Substance and Sexual Activity    Alcohol use: Not Currently     Alcohol/week: 1.0 standard drink of alcohol     Types: 1 Cans of beer per week     Comment: This is rare.    Drug use: Never    Sexual activity: Never         No Known Allergies      Pain Scale: 0/10        ROS:  Review of Systems   Neurological:  Positive for seizures (no known seizures in last 3 years) and numbness. Negative for dizziness, tremors, syncope, facial asymmetry, speech difficulty, weakness, light-headedness and headaches.   Psychiatric/Behavioral: Negative.         I have reviewed and agree with the above ROS complete by medical assistant.    Physical Exam:  Vitals:    07/24/25 1439   BP: 120/88   Pulse: 64   SpO2: 98%   Weight: 112 kg (246 lb)   Height: 175.3 cm (69.02\")       Orthostatic BP:       Physical Exam  General: Obese white male no acute distress  HEENT: Normocephalic no evidence of trauma  Neck: Supple.    Heart: Regular rate and rhythm  Extremities: Radial pulses strong and simultaneous.        Neurological Exam:   Mental Status: Awake, alert, oriented to person, place and time.  Conversant without evidence of an affective disorder, thought disorder, delusions or hallucinations.  Attention span and concentration are normal.  HCF: No aphasia, apraxia or dysarthria.  Recent and remote memory intact.  Knowledge of recent events intact.  CN: I:   II: Visual fields right homonymous hemianopsia    III, IV, VI: Eye movements intact without nystagmus or ptosis.  Pupils equal round and reactive to light.   V,VII: " Light touch and pinprick intact all 3 divisions of V.  Facial muscles symmetrical.   VIII: Hearing intact to finger rub   IX,X: Soft palate elevates symmetrically   XI: Sternomastoid and trapezius are strong.   XII: Tongue midline without atrophy or fasciculations    Motor: Normal tone and bulk in the upper and lower extremities.    Power testing: Mild weakness of right ankle dorsiflexion but good strength in all the muscles tested in the arms and legs    Reflexes: Upper extremities: Diffusely hypoactive        Lower extremities: Diffusely hypoactive        Toe signs:        Clonus:     Sensory: Light touch:        Pinprick:        Vibration:        Position:        Temperature:    Cerebellar: Finger-to-nose: A little slow on the right           Rapid movement: Normal           Heel-to-shin:    Gait and Station: Comes to stand without difficulty.  Mildly broad-based.  Slightly spastic appearing gait in the right leg.  Able to raise on toes and heels with assist.  No Romberg no drift.      Results:    Lab Results   Component Value Date    GLUCOSE 99 03/10/2025    BUN 7 03/10/2025    CREATININE 0.87 03/10/2025    EGFRIFNONA 88 12/21/2021    EGFRIFAFRI 102 12/21/2021    BCR 8.0 03/10/2025    CO2 26.1 03/10/2025    CALCIUM 9.3 03/10/2025    ALBUMIN 4.6 03/10/2025    AST 24 03/10/2025    ALT 22 03/10/2025     Lab Results   Component Value Date    WBC 6.40 03/10/2025    HGB 15.0 03/10/2025    HCT 46.4 03/10/2025    MCV 85.1 03/10/2025     03/10/2025     Lab Results   Component Value Date    TSH 1.200 09/16/2024     Lab Results   Component Value Date    ZWEMHWLY73 821 03/10/2025     Lab Results   Component Value Date    FOLATE 5.59 03/10/2025     Lab Results   Component Value Date    HGBA1C 5.80 (H) 03/10/2025     Lab Results   Component Value Date    SEDRATE 6 03/17/2020     Assessment:    1.  History of hemorrhagic stroke-no new strokelike symptoms.  Residual right dorsiflexion weakness and sensory changes.  Right  "homonymous hemianopsia.  2.  Complex partial seizures-no reoccurrence.  Well-controlled on oxcarbazepine.  No behavioral changes.  Sodium was a little low at 133 but patient also on spironolactone which could be contributing.  3.  Elevated CK-remained stable but still elevated.  On 3/10/2025 CK was 719.  Patient denies any muscle pain, cramping or weakness on the left side.  His pravastatin was reduced to 40 mg daily.  Patient's lipid profile on 3/10/2025 was normal.  Cholesterol 129, HDL 51, LDL 66 and triglycerides 56.         Assessment and Plan   Diagnoses and all orders for this visit:    1. Seizure disorder as sequela of cerebrovascular accident (Primary)    2. History of stroke    3. Elevated CK        Ideal targets for risk factor control would be Blood pressure < 130/80, B12 > 500, TSH in normal range and LDL < 70; HbA1c < 6.5 and smoking cessation if applicable. Call 911 for stroke symptoms.  Recommend 150 minutes of physical activity weekly.  Limit alcohol intake.  Continue oxcarbazepine 600 mg twice a day  Stroke Acronym \"BE FAST\" B=Balance issues, E=Vision changes, F=Face weakness or numbness, A=Arm or Leg weakness or numbness, S=Speech problems and T=Time to call 911.   Follow-up in 6 months or sooner if needed      Time: Spent 30 minutes in total encounter time. This included time for chart review, obtaining history, performing pertinent physical examination, updating medical information, ordering tests/referrals, discussion of diagnosis, medical management, counseling, and encounter documentation.        JUAN DANIEL Acharya          Much of this encounter note was dictated utilizing Dragon dictation which is an electronic transcription/translation of spoken language to printed text. The electronic translation of spoken language may permit erroneous, or at times, nonsensical words or phrases to be inadvertently transcribed; Although I have reviewed the note for such errors, some may still " exist.    Portions of this assessment have been copied from previous documentation which has been thoroughly reviewed and updated as appropriate.

## 2025-07-24 ENCOUNTER — OFFICE VISIT (OUTPATIENT)
Dept: NEUROLOGY | Facility: CLINIC | Age: 55
End: 2025-07-24
Payer: MEDICARE

## 2025-07-24 VITALS
WEIGHT: 246 LBS | OXYGEN SATURATION: 98 % | BODY MASS INDEX: 36.43 KG/M2 | SYSTOLIC BLOOD PRESSURE: 120 MMHG | DIASTOLIC BLOOD PRESSURE: 88 MMHG | HEIGHT: 69 IN | HEART RATE: 64 BPM

## 2025-07-24 DIAGNOSIS — G40.909 SEIZURE DISORDER AS SEQUELA OF CEREBROVASCULAR ACCIDENT: Primary | ICD-10-CM

## 2025-07-24 DIAGNOSIS — R74.8 ELEVATED CK: ICD-10-CM

## 2025-07-24 DIAGNOSIS — I69.398 SEIZURE DISORDER AS SEQUELA OF CEREBROVASCULAR ACCIDENT: Primary | ICD-10-CM

## 2025-07-24 DIAGNOSIS — Z86.73 HISTORY OF STROKE: ICD-10-CM

## (undated) DEVICE — ADAPT CLN BIOGUARD AIR/H2O DISP

## (undated) DEVICE — GLV SURG BIOGEL LTX PF 6 1/2

## (undated) DEVICE — TRAP FLD MINIVAC MEGADYNE 100ML

## (undated) DEVICE — APPL CHLORAPREP HI/LITE 26ML ORNG

## (undated) DEVICE — SUT VIC 3/0 CT2 27IN J232H

## (undated) DEVICE — SUT VIC 2/0 CT2 27IN J269H

## (undated) DEVICE — KT ORCA ORCAPOD DISP STRL

## (undated) DEVICE — TUBING, SUCTION, 1/4" X 10', STRAIGHT: Brand: MEDLINE

## (undated) DEVICE — DRSNG TELFA PAD NONADH STR 1S 3X4IN

## (undated) DEVICE — CANN O2 ETCO2 FITS ALL CONN CO2 SMPL A/ 7IN DISP LF

## (undated) DEVICE — ELECTRD BLD EDGE/INSUL1P 2.4X5.1MM STRL

## (undated) DEVICE — LOU MINOR PROCEDURE: Brand: MEDLINE INDUSTRIES, INC.

## (undated) DEVICE — SUT VIC 3/0 TIES 18IN J110T

## (undated) DEVICE — SINGLE-USE BIOPSY FORCEPS: Brand: RADIAL JAW 4

## (undated) DEVICE — ANTIBACTERIAL UNDYED BRAIDED (POLYGLACTIN 910), SYNTHETIC ABSORBABLE SUTURE: Brand: COATED VICRYL

## (undated) DEVICE — SENSR O2 OXIMAX FNGR A/ 18IN NONSTR

## (undated) DEVICE — PENCL E/S ULTRAVAC TELESCP NOSE HOLSTR 10FT

## (undated) DEVICE — LN SMPL CO2 SHTRM SD STREAM W/M LUER

## (undated) DEVICE — SUT VIC 2/0 TIES 18IN J111T